# Patient Record
Sex: MALE | Race: WHITE | Employment: FULL TIME | URBAN - METROPOLITAN AREA
[De-identification: names, ages, dates, MRNs, and addresses within clinical notes are randomized per-mention and may not be internally consistent; named-entity substitution may affect disease eponyms.]

---

## 2023-03-03 ENCOUNTER — HOSPITAL ENCOUNTER (INPATIENT)
Age: 63
LOS: 11 days | Discharge: HOME | DRG: 215 | End: 2023-03-14
Attending: EMERGENCY MEDICINE | Admitting: INTERNAL MEDICINE
Payer: COMMERCIAL

## 2023-03-03 ENCOUNTER — APPOINTMENT (OUTPATIENT)
Dept: GENERAL RADIOLOGY | Age: 63
DRG: 215 | End: 2023-03-03
Payer: COMMERCIAL

## 2023-03-03 DIAGNOSIS — I48.91 ATRIAL FIBRILLATION WITH RVR (HCC): Primary | ICD-10-CM

## 2023-03-03 LAB
A/G RATIO: 1.4 (ref 1.1–2.2)
ALBUMIN SERPL-MCNC: 4.2 G/DL (ref 3.4–5)
ALP BLD-CCNC: 98 U/L (ref 40–129)
ALT SERPL-CCNC: 27 U/L (ref 10–40)
ANION GAP SERPL CALCULATED.3IONS-SCNC: 13 MMOL/L (ref 3–16)
AST SERPL-CCNC: 31 U/L (ref 15–37)
BASOPHILS ABSOLUTE: 0 K/UL (ref 0–0.2)
BASOPHILS RELATIVE PERCENT: 0.4 %
BILIRUB SERPL-MCNC: 0.5 MG/DL (ref 0–1)
BUN BLDV-MCNC: 18 MG/DL (ref 7–20)
CALCIUM SERPL-MCNC: 9.9 MG/DL (ref 8.3–10.6)
CHLORIDE BLD-SCNC: 103 MMOL/L (ref 99–110)
CHOLESTEROL, TOTAL: 180 MG/DL (ref 0–199)
CO2: 22 MMOL/L (ref 21–32)
CREAT SERPL-MCNC: 1 MG/DL (ref 0.8–1.3)
EKG ATRIAL RATE: 182 BPM
EKG DIAGNOSIS: NORMAL
EKG Q-T INTERVAL: 250 MS
EKG QRS DURATION: 86 MS
EKG QTC CALCULATION (BAZETT): 429 MS
EKG R AXIS: 31 DEGREES
EKG T AXIS: 116 DEGREES
EKG VENTRICULAR RATE: 177 BPM
EOSINOPHILS ABSOLUTE: 0.1 K/UL (ref 0–0.6)
EOSINOPHILS RELATIVE PERCENT: 0.6 %
GFR SERPL CREATININE-BSD FRML MDRD: >60 ML/MIN/{1.73_M2}
GLUCOSE BLD-MCNC: 117 MG/DL (ref 70–99)
HCT VFR BLD CALC: 44.9 % (ref 40.5–52.5)
HDLC SERPL-MCNC: 40 MG/DL (ref 40–60)
HEMOGLOBIN: 14.9 G/DL (ref 13.5–17.5)
LDL CHOLESTEROL CALCULATED: 119 MG/DL
LV EF: 18 %
LVEF MODALITY: NORMAL
LYMPHOCYTES ABSOLUTE: 1.5 K/UL (ref 1–5.1)
LYMPHOCYTES RELATIVE PERCENT: 17.2 %
MAGNESIUM: 2.1 MG/DL (ref 1.8–2.4)
MCH RBC QN AUTO: 31.4 PG (ref 26–34)
MCHC RBC AUTO-ENTMCNC: 33.3 G/DL (ref 31–36)
MCV RBC AUTO: 94.2 FL (ref 80–100)
MONOCYTES ABSOLUTE: 0.6 K/UL (ref 0–1.3)
MONOCYTES RELATIVE PERCENT: 7 %
NEUTROPHILS ABSOLUTE: 6.4 K/UL (ref 1.7–7.7)
NEUTROPHILS RELATIVE PERCENT: 74.8 %
PDW BLD-RTO: 14.8 % (ref 12.4–15.4)
PLATELET # BLD: 281 K/UL (ref 135–450)
PMV BLD AUTO: 8.4 FL (ref 5–10.5)
POTASSIUM SERPL-SCNC: 4.2 MMOL/L (ref 3.5–5.1)
PRO-BNP: 3971 PG/ML (ref 0–124)
RBC # BLD: 4.76 M/UL (ref 4.2–5.9)
SODIUM BLD-SCNC: 138 MMOL/L (ref 136–145)
SPECIMEN STATUS: NORMAL
TOTAL PROTEIN: 7.2 G/DL (ref 6.4–8.2)
TRIGL SERPL-MCNC: 103 MG/DL (ref 0–150)
TROPONIN: <0.01 NG/ML
TSH SERPL DL<=0.05 MIU/L-ACNC: 1.41 UIU/ML (ref 0.27–4.2)
VLDLC SERPL CALC-MCNC: 21 MG/DL
WBC # BLD: 8.6 K/UL (ref 4–11)

## 2023-03-03 PROCEDURE — 6360000002 HC RX W HCPCS: Performed by: INTERNAL MEDICINE

## 2023-03-03 PROCEDURE — 93005 ELECTROCARDIOGRAM TRACING: CPT | Performed by: EMERGENCY MEDICINE

## 2023-03-03 PROCEDURE — 93010 ELECTROCARDIOGRAM REPORT: CPT | Performed by: INTERNAL MEDICINE

## 2023-03-03 PROCEDURE — 96365 THER/PROPH/DIAG IV INF INIT: CPT

## 2023-03-03 PROCEDURE — 2580000003 HC RX 258: Performed by: INTERNAL MEDICINE

## 2023-03-03 PROCEDURE — 2580000003 HC RX 258: Performed by: EMERGENCY MEDICINE

## 2023-03-03 PROCEDURE — 6370000000 HC RX 637 (ALT 250 FOR IP): Performed by: INTERNAL MEDICINE

## 2023-03-03 PROCEDURE — 83735 ASSAY OF MAGNESIUM: CPT

## 2023-03-03 PROCEDURE — 96366 THER/PROPH/DIAG IV INF ADDON: CPT

## 2023-03-03 PROCEDURE — 2500000003 HC RX 250 WO HCPCS: Performed by: EMERGENCY MEDICINE

## 2023-03-03 PROCEDURE — 84484 ASSAY OF TROPONIN QUANT: CPT

## 2023-03-03 PROCEDURE — 85025 COMPLETE CBC W/AUTO DIFF WBC: CPT

## 2023-03-03 PROCEDURE — 2060000000 HC ICU INTERMEDIATE R&B

## 2023-03-03 PROCEDURE — 71045 X-RAY EXAM CHEST 1 VIEW: CPT

## 2023-03-03 PROCEDURE — 83036 HEMOGLOBIN GLYCOSYLATED A1C: CPT

## 2023-03-03 PROCEDURE — 80053 COMPREHEN METABOLIC PANEL: CPT

## 2023-03-03 PROCEDURE — 99285 EMERGENCY DEPT VISIT HI MDM: CPT

## 2023-03-03 PROCEDURE — 2500000003 HC RX 250 WO HCPCS: Performed by: INTERNAL MEDICINE

## 2023-03-03 PROCEDURE — 36415 COLL VENOUS BLD VENIPUNCTURE: CPT

## 2023-03-03 PROCEDURE — 83880 ASSAY OF NATRIURETIC PEPTIDE: CPT

## 2023-03-03 PROCEDURE — 84443 ASSAY THYROID STIM HORMONE: CPT

## 2023-03-03 PROCEDURE — 99291 CRITICAL CARE FIRST HOUR: CPT | Performed by: INTERNAL MEDICINE

## 2023-03-03 PROCEDURE — 80061 LIPID PANEL: CPT

## 2023-03-03 PROCEDURE — C8929 TTE W OR WO FOL WCON,DOPPLER: HCPCS

## 2023-03-03 RX ORDER — ACETAMINOPHEN 650 MG/1
650 SUPPOSITORY RECTAL EVERY 6 HOURS PRN
Status: DISCONTINUED | OUTPATIENT
Start: 2023-03-03 | End: 2023-03-13

## 2023-03-03 RX ORDER — FUROSEMIDE 10 MG/ML
20 INJECTION INTRAMUSCULAR; INTRAVENOUS DAILY
Status: DISCONTINUED | OUTPATIENT
Start: 2023-03-03 | End: 2023-03-07

## 2023-03-03 RX ORDER — NICOTINE 21 MG/24HR
1 PATCH, TRANSDERMAL 24 HOURS TRANSDERMAL DAILY
Status: DISCONTINUED | OUTPATIENT
Start: 2023-03-03 | End: 2023-03-14 | Stop reason: HOSPADM

## 2023-03-03 RX ORDER — DILTIAZEM HYDROCHLORIDE 5 MG/ML
10 INJECTION INTRAVENOUS ONCE
Status: COMPLETED | OUTPATIENT
Start: 2023-03-03 | End: 2023-03-03

## 2023-03-03 RX ORDER — ENOXAPARIN SODIUM 150 MG/ML
1 INJECTION SUBCUTANEOUS 2 TIMES DAILY
Status: DISCONTINUED | OUTPATIENT
Start: 2023-03-03 | End: 2023-03-09

## 2023-03-03 RX ORDER — FUROSEMIDE 10 MG/ML
20 INJECTION INTRAMUSCULAR; INTRAVENOUS ONCE
Status: COMPLETED | OUTPATIENT
Start: 2023-03-03 | End: 2023-03-03

## 2023-03-03 RX ORDER — SODIUM CHLORIDE 0.9 % (FLUSH) 0.9 %
5-40 SYRINGE (ML) INJECTION EVERY 12 HOURS SCHEDULED
Status: DISCONTINUED | OUTPATIENT
Start: 2023-03-03 | End: 2023-03-09 | Stop reason: SDUPTHER

## 2023-03-03 RX ORDER — ONDANSETRON 4 MG/1
4 TABLET, ORALLY DISINTEGRATING ORAL EVERY 8 HOURS PRN
Status: DISCONTINUED | OUTPATIENT
Start: 2023-03-03 | End: 2023-03-07

## 2023-03-03 RX ORDER — LOSARTAN POTASSIUM 25 MG/1
25 TABLET ORAL DAILY
Status: DISCONTINUED | OUTPATIENT
Start: 2023-03-03 | End: 2023-03-07

## 2023-03-03 RX ORDER — SODIUM CHLORIDE 9 MG/ML
INJECTION, SOLUTION INTRAVENOUS PRN
Status: DISCONTINUED | OUTPATIENT
Start: 2023-03-03 | End: 2023-03-10

## 2023-03-03 RX ORDER — AMIODARONE HYDROCHLORIDE 200 MG/1
200 TABLET ORAL 2 TIMES DAILY
Status: DISCONTINUED | OUTPATIENT
Start: 2023-03-03 | End: 2023-03-04

## 2023-03-03 RX ORDER — SODIUM CHLORIDE 0.9 % (FLUSH) 0.9 %
5-40 SYRINGE (ML) INJECTION PRN
Status: DISCONTINUED | OUTPATIENT
Start: 2023-03-03 | End: 2023-03-09 | Stop reason: SDUPTHER

## 2023-03-03 RX ORDER — POLYETHYLENE GLYCOL 3350 17 G/17G
17 POWDER, FOR SOLUTION ORAL DAILY PRN
Status: DISCONTINUED | OUTPATIENT
Start: 2023-03-03 | End: 2023-03-14 | Stop reason: HOSPADM

## 2023-03-03 RX ORDER — ACETAMINOPHEN 325 MG/1
650 TABLET ORAL EVERY 6 HOURS PRN
Status: DISCONTINUED | OUTPATIENT
Start: 2023-03-03 | End: 2023-03-13

## 2023-03-03 RX ORDER — DILTIAZEM HYDROCHLORIDE 5 MG/ML
10 INJECTION INTRAVENOUS ONCE
Status: DISCONTINUED | OUTPATIENT
Start: 2023-03-03 | End: 2023-03-03

## 2023-03-03 RX ORDER — ONDANSETRON 2 MG/ML
4 INJECTION INTRAMUSCULAR; INTRAVENOUS EVERY 6 HOURS PRN
Status: DISCONTINUED | OUTPATIENT
Start: 2023-03-03 | End: 2023-03-07

## 2023-03-03 RX ADMIN — LOSARTAN POTASSIUM 25 MG: 25 TABLET, FILM COATED ORAL at 17:18

## 2023-03-03 RX ADMIN — ENOXAPARIN SODIUM 105 MG: 150 INJECTION SUBCUTANEOUS at 21:39

## 2023-03-03 RX ADMIN — AMIODARONE HYDROCHLORIDE 200 MG: 200 TABLET ORAL at 17:18

## 2023-03-03 RX ADMIN — SODIUM CHLORIDE 15 MG/HR: 900 INJECTION, SOLUTION INTRAVENOUS at 13:54

## 2023-03-03 RX ADMIN — ENOXAPARIN SODIUM 105 MG: 150 INJECTION SUBCUTANEOUS at 14:08

## 2023-03-03 RX ADMIN — FUROSEMIDE 20 MG: 10 INJECTION, SOLUTION INTRAMUSCULAR; INTRAVENOUS at 14:12

## 2023-03-03 RX ADMIN — DILTIAZEM HYDROCHLORIDE 10 MG: 5 INJECTION INTRAVENOUS at 06:37

## 2023-03-03 RX ADMIN — FUROSEMIDE 20 MG: 10 INJECTION, SOLUTION INTRAMUSCULAR; INTRAVENOUS at 17:20

## 2023-03-03 RX ADMIN — Medication 10 ML: at 21:39

## 2023-03-03 RX ADMIN — SODIUM CHLORIDE 5 MG/HR: 900 INJECTION, SOLUTION INTRAVENOUS at 06:37

## 2023-03-03 RX ADMIN — AMIODARONE HYDROCHLORIDE 1 MG/MIN: 50 INJECTION, SOLUTION INTRAVENOUS at 17:28

## 2023-03-03 RX ADMIN — DILTIAZEM HYDROCHLORIDE 10 MG: 5 INJECTION INTRAVENOUS at 07:39

## 2023-03-03 ASSESSMENT — ENCOUNTER SYMPTOMS
STRIDOR: 0
HEMATOCHEZIA: 0
LEFT EYE: 0
HEMATEMESIS: 0
WHEEZING: 0
RIGHT EYE: 0
SHORTNESS OF BREATH: 1
ORTHOPNEA: 1

## 2023-03-03 NOTE — CONSULTS
Cardiac Electrophysiology Consult Note      Physician requesting consult: Joselin Monsivais MD   Reason for Consult: atrial fibrillation   Admission Date: 3/3/2023  Room/Bed:  [facility]8/0208-01    Assessment:     1. Atrial fibrillation: patient has first documented episode of atrial fibrillation. Associated symptoms: Dyspnea on exertion, Fatigue, and Shortness of breath     History of cardioversion: No prior history of cardioversion  History of AF ablation: No history of atrial fibrillation ablation in the past  History of heart surgery/procedure: no    Current use of anti-arrhythmic drugs: Not currently on anti-arrhythmic drugs  Previous use of anti-arrhythmic drugs: Not previously on any anti-arrhythmic drugs    Overall LV function: Severe left ventricular systolic dysfunction   Size of left atrium: Moderately dilated LA size  Significant cardiac valvular disease: No significant valve disease  Family history of atrial fibrillation: Yes, mother and others    Alcohol consumption: Moderate alcohol consumption  Caffeine consumption: Moderate caffeine intake (coffee)  Smoking status: smoker  (1/2 ppd x many yrs)  Obstructive sleep apnea: Suspected, not yet tested  Exercise status: Does not exercise regularly (sedentary lifestyle)    I had a detailed discussion with the patient about issues related to atrial fibrillation (including etiology, disease progression patterns, stroke risk and rate control issues). Patient had his questions answered to his satisfaction. Patient has a [id number] score of at least 2 [Heart failure (1 point) and Hypertension (1 point)]  Longterm anticoagulation is: recommended  Current anticoagulation: Low molecular weight heparin (LMWH)  Bleeding issues reported: no  Renal function: Normal  Thyroid function:  pending    Patient presented with signs and symptoms of HF and was found to have severe LV systolic dysfunction.  His atria are dilated indicating the AF likely has been ongoing for much longer than the past 2 weeks. Not clear if the cardiomyopathy caused the atrial fibrillation (or vice versa). Regardless, the goal now is to slow him down and plan for SUSI/CV to assess him in sinus rhythm. His cardiomyopathy will need investigation of etiology once more stable. Would avoid calcium channel blocker like diltiazem in severe cardiomyopathy and HF. Will switch patient to IV and oral amiodarone to load him up in anticipation of cardioversion Monday (SUSI guided). Since his is naive to diuretics, IV lasix 20 mg daily would be a good starting point and increase dose as needed. He is already feeling significantly better. Once no more procedure planned, can transition to oral anticoagulation. 2. Cardiomyopathy and acute systolic heart failure: as above. Has evidence of left sided heart failure (both on exam and imaging studies). Will not start beta-blocker yet with acute decompensated HF. But will need introduction of HF medications gradually. Start losartan 25 mg daily. 3. Elevated blood pressure without diagnosis of hypertension    Plan:     1. Discontinue IV diltiazem drip  2. Start amiodarone drip (1mg/min)  3. Start oral amiodarone 200 BID  4. Start furosemide 20 IV daily  5. Start losartan 25 mg daily  6. NPO Sunday night for SUSI/CV Monday (scheduled)    Subjective:     History of Present Illness:    Patient is a 58 y.o. male with past medical history significant for active smoking but otherwise no other medical known issues. He presented to the hospital ED this morning with complaints of fatigue and shortness of breath for the past couple of weeks. Patient states that the symptoms got progressively worse over the past few days. Last night, he could not lay down and felt as if he was suffocating. He went to the bathroom and felt as if there was no air. Denies any recent episodes of syncope. No reported dizziness. Has not been experiencing any chest pain.     Denies any leg swelling  No bleeding issues reported    Drinks two beers per day  Smokes 1/2 PPD for many years  Moderate caffeine intake    Mother with atrial fibrillation   Father  in 52's of apparent cardiomyopathy    Problem List:  Patient Active Problem List   Diagnosis    Atrial fibrillation with rapid ventricular response ([facility] [address]       History:  History reviewed. No pertinent past medical history. History reviewed. No pertinent surgical history. History reviewed. No pertinent family history. Social History     Socioeconomic History    Marital status: Single     Spouse name: None    Number of children: None    Years of education: None    Highest education level: None   Tobacco Use    Smoking status: Every Day     Types: Cigarettes    Smokeless tobacco: Never   Substance and Sexual Activity    Alcohol use: Yes     Comment: daily    Drug use: Not Currently         Review of Systems:  Review of Systems   Constitutional: Positive for malaise/fatigue. Negative for weight gain and weight loss. HENT:  Negative for nosebleeds and stridor. Eyes:  Negative for vision loss in left eye and vision loss in right eye. Cardiovascular:  Positive for dyspnea on exertion and orthopnea. Negative for chest pain, leg swelling and syncope. Respiratory:  Positive for shortness of breath. Negative for wheezing. Hematologic/Lymphatic: Negative for bleeding problem. Does not bruise/bleed easily. Skin:  Negative for itching and rash. Musculoskeletal:  Negative for joint pain and joint swelling. Gastrointestinal:  Negative for hematemesis and hematochezia. Genitourinary:  Negative for dysuria and hematuria. Neurological:  Negative for dizziness and light-headedness. Psychiatric/Behavioral:  Negative for altered mental status. The patient is not nervous/anxious.       Objective:     Vital Signs (last 24 hours):  Patient Vitals for the past 24 hrs:   BP Temp Temp src Pulse Resp SpO2 Height Weight   23 1530 -- -- -- -- -- -- 6' 2\" (1.88 m) 235 lb (106.6 kg)   03/03/23 1401 (!) 146/117 97.5 °F (36.4 °C) Axillary (!) 124 18 97 % -- --   03/03/23 1301 (!) 124/92 98.3 °F (36.8 °C) Oral (!) 133 16 96 % -- --   03/03/23 1115 (!) 134/101 -- -- (!) 125 16 95 % -- --   03/03/23 0929 (!) 131/110 -- -- (!) 133 16 96 % -- --   03/03/23 0908 -- -- -- -- -- -- -- 235 lb (106.6 kg)   03/03/23 0811 (!) 139/108 -- -- -- -- -- -- --   03/03/23 0737 (!) 136/105 -- -- (!) 135 16 94 % -- --   03/03/23 0712 (!) 112/90 -- -- (!) 136 16 98 % -- --   03/03/23 0710 (!) 112/90 -- -- (!) 136 -- -- -- --   03/03/23 0549 (!) 145/110 98.1 °F (36.7 °C) Oral (!) 176 16 98 % -- --         Physical Examination:     Physical Exam  Constitutional:       Appearance: Normal appearance. HENT:      Head: Normocephalic and atraumatic. Nose: Nose normal. No rhinorrhea. Eyes:      General: No scleral icterus. Conjunctiva/sclera: Conjunctivae normal.   Cardiovascular:      Rate and Rhythm: Tachycardia present. Rhythm irregularly irregular. Pulmonary:      Effort: Pulmonary effort is normal.      Breath sounds: Decreased breath sounds and rales present. Abdominal:      General: There is no distension. Musculoskeletal:         General: Normal range of motion. Cervical back: Normal range of motion and neck supple. Skin:     General: Skin is warm and dry. Neurological:      General: No focal deficit present. Mental Status: He is alert and oriented to person, place, and time. Psychiatric:         Mood and Affect: Mood normal.         Behavior: Behavior normal.       No intake/output data recorded.     Medications:    Current Facility-Administered Medications   Medication Dose Route Frequency Provider Last Rate Last Admin    dilTIAZem 100 mg in sodium chloride 0.9 % 100 mL infusion ([facility])  2.5-15 mg/hr IntraVENous Continuous Cheryl Wolfe MD 15 mL/hr at 03/03/23 1354 15 mg/hr at 03/03/23 1354    sodium chloride flush 0.9 % injection 5-40 mL  5-40 mL IntraVENous 2 times per day Selene Jarrett MD        sodium chloride flush 0.9 % injection 5-40 mL  5-40 mL IntraVENous PRN Kacy Lopez MD        0.9 % sodium chloride infusion   IntraVENous PRN Selene Jarrett MD        ondansetron (ZOFRAN-ODT) disintegrating tablet 4 mg  4 mg Oral Q8H PRN Selene Jarrett MD        Or    ondansetron (ZOFRAN) injection 4 mg  4 mg IntraVENous Q6H PRN Selene Jarrett MD        polyethylene glycol (GLYCOLAX) packet 17 g  17 g Oral Daily PRN Selene Jarrett MD        acetaminophen (TYLENOL) tablet 650 mg  650 mg Oral Q6H PRN Selene Jarrett MD        Or    acetaminophen (TYLENOL) suppository 650 mg  650 mg Rectal Q6H PRN Selene Jarrett MD        perflutren lipid microspheres (DEFINITY) injection 1.5 mL  1.5 mL IntraVENous ONCE PRN Selene Jarrett MD        enoxaparin (LOVENOX) injection 105 mg  1 mg/kg (Order-Specific) SubCUTAneous BID Selene Jarrett MD   105 mg at 03/03/23 1408    nicotine (NICODERM CQ) 14 MG/24HR 1 patch  1 patch TransDERmal Daily Selene Jarrett MD   1 patch at 03/03/23 1412         ECG Interpretation:  (Date: 3/3/2023)  Rhythm: Atrial fibrillation  Rate: 177 BPM  PAC's / PVC's present: None  Conduction abnormalities:  none  Axis: normal        Echocardiogram:    Results for orders placed or performed during the hospital encounter of 03/03/23   Echo Complete    Narrative        Summary   Technically difficult study due to patient is a smoker and poor acoustic   window. Difficult to assess left ventricular systolic function due to arrhythmia but   appears severely reduced ef 15-20%. Severe global HK with regional variation. Mild concentric left ventricular hypertrophy. Left ventricular diastolic filling pressure is elevated. Mild to moderate mitral regurgitation. Moderate Bi-atrial enlargement. Right ventricular systolic function is mildly reduced . Mild tricuspid regurgitation.    Systolic pulmonic artery pressure (SPAP) is estimated at 41 mmHg consistent   with mild pulmonary hypertension (Right atrial pressure of 8 mmHg). Findings      Left Ventricle   Difficult to assess left ventricular systolic function due to arrhythmia but   appears severely reduced ef 15-20%. Severe global HK with regional variation. Normal left ventricular size with mild concentric left ventricular   hypertrophy. Left ventricular diastolic filling pressure is elevated septal E/e\" is 12. Mitral Valve   Mild thickening of leaflets of mitral valve. No mitral stenosis. Mild to moderate mitral regurgitation. Left Atrium   The left atrium is moderately dilated. Moderate Bi-atrial enlargement. Aortic Valve   Aortic valve appears sclerotic but opens adequately. No evidence of aortic valve regurgitation. Aorta   The aortic root is normal in size. Right Ventricle   Normal right ventricular size. Right ventricular systolic function is mildly reduced . TAPSE is measured at 13 mm. S' prime velocity is measured at 8 cm/s. Tricuspid Valve   Tricuspid valve is structurally normal.   Mild tricuspid regurgitation. Systolic pulmonic artery pressure (SPAP) is estimated at 41 mmHg consistent   with mild pulmonary hypertension (Right atrial pressure of 8 mmHg). Right Atrium   The right atrium is moderately dilated in size at 24 cm2. Pulmonic Valve   The pulmonic valve is not well visualized. No evidence of pulmonic valve regurgitation. Pericardial Effusion   No significant pericardial effusion noted. Pleural Effusion   Left pleural effusion      Miscellaneous   IVC size is normal (<2.1 cm) but collapses < 50% with respiration consistent   with elevated RA pressure (8 mmHg). Technically difficult study due to   patient is a smoker and poor acoustic window.      M-Mode/2D Measurements (cm)      LV Diastolic Dimension: 7.89 cm LV Systolic Dimension: 2.58 cm   LV Septum Diastolic: 5.82 cm   LV PW Diastolic: 3.21 cm        AO Root Dimension: 3.2 cm                                   LA Dimension: 4.5 cm                                   LA Area: 26.2 cm2                                   LA volume/Index: 92.07 ml /40 ml/m2     Doppler Measurements      AV Peak Velocity: 74.7 cm/s    MV Peak E-Wave: 98.1 cm/s   AV Peak Gradient: 2.23 mmHg      TR Velocity:288 cm/s   TR Gradient:33.18 mmHg   Estimated RAP:8 mmHg   Estimated RVSP: 41 mmHg   E' Septal Velocity: 7.29 cm/s   E' Lateral Velocity: 9.36 cm/s   E/E' ratio: 11   PV Peak Velocity: 82.8 cm/s   PV Peak Gradient: 2.74 mmHg      Aortic Valve      Peak Velocity: 74.7 cm/s   Peak Gradient: 2.23 mmHg     Aorta      Aortic Root: 3.2 cm          Telemetry:    Atrial fibrillation with rapid ventricular response     Lab Review:    Recent Labs     03/03/23  0557   WBC 8.6   HGB 14.9   HCT 44.9   MCV 94.2          Recent Labs     03/03/23  0557      K 4.2      CO2 22   BUN 18   CREATININE 1.0       No results for input(s): INR, PROTIME in the last 72 hours. Recent Labs     03/03/23  0557 03/03/23  1413   TROPONINI <0.01 <0.01       Recent Labs     03/03/23  0557   PROBNP 3,971*       Imaging:    XR CHEST PORTABLE    Result Date: 3/3/2023  1. Cardiomegaly with bilateral lung infiltrates and bilateral pleural effusions, likely related to pulmonary edema. Superimposed pneumonia cannot be excluded. Patient was seen in the telemetry unit. Due to the high probability of clinically significant life threating deterioration of the patient's condition, a total critical care time 40 minutes was used. This time excludes any time spent performing procedures. Time includes, but not limited to, review of vital signs, telemetry monitoring, continuous pulse oximety, IV medications, clinical response to medications and cardiovascular test results.  It also includes documentation time, consultation time, interpretation of lab data, and review of nursing notes and available old records.         Signed by: Arabella Thornton MD

## 2023-03-03 NOTE — ED PROVIDER NOTES
[facility]  ED     EMERGENCY DEPARTMENT ENCOUNTER            Pt Name: Peter Zamora   MRN: [id number]   [place] 1960   Date of evaluation: 3/3/2023   Provider: Saúl Celestin DO   PCP: No primary care provider on file. Note Started: 8:20 AM EST 3/3/23          CHIEF COMPLAINT     Chief Complaint   Patient presents with    Shortness of Breath     Started a couple weeks ago, worst the last couple days. HISTORY OF PRESENT ILLNESS:   History from : Patient   Limitations to history : None     Peter Zamora is a 58 y.o. male who presents to the emergency department complaining of fatigue and shortness of breath over the last couple of weeks. Patient denies fevers, chills, or sweats. No cough, congestion. No orthopnea. Patient denies chest pain. No lower extremity swelling or edema. No sick contacts. Patient arrives into the emergency department with heart rate of approximately 170. Nursing Notes were all reviewed and agreed with, or any disagreements were addressed in the HPI. REVIEW OF SYSTEMS :    Positives and Pertinent negatives as per HPI. MEDICAL HISTORY   has no past medical history on file. No past surgical history on file. CURRENTMEDICATIONS       Previous Medications    No medications on file      SCREENINGS                           CIWA Assessment  BP: (!) 139/108  Heart Rate: (!) 135                  PHYSICAL EXAM :  ED Triage Vitals [03/03/23 0549]   BP Temp Temp Source Heart Rate Resp SpO2 Height Weight   (!) 145/110 98.1 °F (36.7 °C) Oral (!) 176 16 98 % -- --      GENERAL APPEARANCE: Awake and alert. Cooperative. No acute distress. HEAD: Normocephalic. Atraumatic. EYES: PERRL. EOM's grossly intact. ENT: Mucous membranes are moist.   NECK: Supple, trachea midline. HEART: Tachycardic. Irregularly irregular. . Normal S1, S2. No murmurs, rubs or gallops. LUNGS: Respirations unlabored. CTAB. Good air exchange.  No wheezes, rales, or rhonchi. Speaking comfortably in full sentences. ABDOMEN: Soft. Non-distended. Non-tender. No guarding or rebound. Normal Bowel sounds. EXTREMITIES: No peripheral edema. MAEE. No acute deformities. SKIN: Warm and dry. No acute rashes. NEUROLOGICAL: Alert and oriented X 3. CN II-XII intact. No gross facial drooping. Strength 5/5 in all extremities. Sensation intact. No pronator drift. Normal coordination. Gait normal.   PSYCHIATRIC: Normal mood and affect. DIAGNOSTIC RESULTS     LABS:   Labs Reviewed   COMPREHENSIVE METABOLIC PANEL - Abnormal; Notable for the following components:       Result Value    Glucose 117 (*)     All other components within normal limits   BRAIN NATRIURETIC PEPTIDE - Abnormal; Notable for the following components:    Pro-BNP 3,971 (*)     All other components within normal limits   CBC WITH AUTO DIFFERENTIAL   TROPONIN   MAGNESIUM   SAMPLE POSSIBLE BLOOD BANK TESTING      When ordered only abnormal lab results are displayed. All other labs were within normal range or not returned as of this dictation. RADIOLOGY:      Non-plain film images such as CT, Ultrasound and MRI are read by the radiologist. Plain radiographic images are visualized and preliminarily interpreted by the ED Provider with the below findings:   Interpretation per the Radiologist below, if available at the time of this note:     XR CHEST PORTABLE   Preliminary Result   1. Cardiomegaly with bilateral lung infiltrates and bilateral pleural   effusions, likely related to pulmonary edema. Superimposed pneumonia cannot   be excluded. XR CHEST PORTABLE    Result Date: 3/3/2023  EXAMINATION: ONE XRAY VIEW OF THE CHEST 3/3/2023 6:13 am COMPARISON: None. HISTORY: ORDERING SYSTEM PROVIDED HISTORY: sob TECHNOLOGIST PROVIDED HISTORY: Reason for exam:->sob Reason for Exam: sob FINDINGS: The a cardiac silhouette is enlarged.   Mediastinal contours are normal. There are bilateral lung infiltrates with bilateral pleural effusions. No pneumothorax. The visualized osseous structures are unremarkable. 1. Cardiomegaly with bilateral lung infiltrates and bilateral pleural effusions, likely related to pulmonary edema. Superimposed pneumonia cannot be excluded. EKG: Atrial fibrillation with RVR. Rate of 177. Normal axis. Normal intervals and durations. Anterior/septal Q waves noted. No previous EKG available for review. CRITICAL CARE TIME   Critical care 35 minutes was completed on patient in addition to and excluding any procedures noted. Vitals:    Vitals:    03/03/23 0710 03/03/23 0712 03/03/23 0737 03/03/23 0811   BP: (!) 112/90 (!) 112/90 (!) 136/105 (!) 139/108   Pulse: (!) 136 (!) 136 (!) 135    Resp:  16 16    Temp:       TempSrc:       SpO2:  98% 94%              Is this patient to be included in the SEP-1 Core Measure due to severe sepsis or septic shock? No   Exclusion criteria - the patient is NOT to be included for SEP-1 Core Measure due to: Alternative explanation for abnormal labs/vitals that do not relate to sepsis, see MDM for further explanation       CC/HPI Summary, DDx, ED Course, and Reassessment:     Patient presents to the emergency department with palpitations, fatigue, and shortness of breath over the last couple of weeks. Patient presents with heart rate of approximately 1 60-1 70. EKG is concerning for atrial fibrillation with RVR. Abdomen: Soft. Nontender. Nondistended. Extremities: No swelling or edema. Patient was given the following medications:   Medications   dilTIAZem 100 mg in sodium chloride 0.9 % 100 mL infusion ([facility]) (15 mg/hr IntraVENous Rate/Dose Change 3/3/23 0811)   dilTIAZem injection 10 mg (10 mg IntraVENous Given 3/3/23 0637)   dilTIAZem injection 10 mg (10 mg IntraVENous Given 3/3/23 0739)        CONSULTS:   None   Discussion with Other Professionals: Patient discussed with hospitalist.  Patient will be admitted.   {Social Determinants: None   Chronic Conditions:  None    Records Reviewed: NA      Disposition Considerations:    I am the Primary Clinician of Record. FINAL IMPRESSION    1. Atrial fibrillation with RVR (HCC)           DISPOSITION/PLAN     PATIENT REFERRED TO:   No follow-up provider specified.      DISCHARGE MEDICATIONS:   New Prescriptions    No medications on file        DISCONTINUED MEDICATIONS:   Discontinued Medications    No medications on file              (Please note that portions of this note were completed with a voice recognition program.  Efforts were made to edit the dictations but occasionally words are mis-transcribed.)       Mckinley Ny DO (electronically signed)             Mckinley Ny DO  03/03/23 2075

## 2023-03-03 NOTE — CARE COORDINATION
Spoke with patient at bedside. He lives alone in an apartment. He is independent at home, works full time and drives. He denies any services or DME at home. Discussed lack of PCP listed in chart and he confirmed this. Provided him with PCP list and [facility]. Patient does not anticipate any further needs from CM.

## 2023-03-03 NOTE — H&P
Hospital Medicine History & Physical      PCP: No primary care provider on file. Date of Admission: 3/3/2023    Date of Service: Pt seen/examined on 03/03/23 and Admitted to Inpatient with expected LOS greater than two midnights due to medical therapy of atrial fibrillation with rapid ventricular response, new onset    Chief Complaint: Palpitations and shortness of breath      History Of Present Illness:      58 y.o. male who presented to [facility] with 2 to 3-week duration of persistent shortness of breath with exertion in addition to chronic fatigue. No fever or chills. No sick contacts. No chest pain. No orthopnea. Has not noticed any swelling. No past cardiac history. Not seen by a physician for a long time because he did not need it. Noticed about 2 to 3 weeks ago that patient was getting short of breath especially with exertion. Recently only a few steps make the patient more short of breath. Noticed palpitations and checked his pulse was very fast so decided to come to the emergency department. No other accompanying symptoms    Nothing else that makes the patient feel better or worse. In the emergency department, patient was diagnosed with atrial fibrillation with rapid ventricular response. At the time of arrival, heart rate was around 170. Confirmed that never had anything like this before. Hospitalist service was called for admission. Seen in the emergency department, pleasant in no acute distress. Past Medical History:      History reviewed. No pertinent past medical history. Past Surgical History:      History reviewed. No pertinent surgical history. Medications Prior to Admission:      Patient is not taking any home medications    Allergies:  Patient has no known allergies. Social History:      The patient currently lives at home    TOBACCO:   reports that he has been smoking cigarettes.  He has never used smokeless tobacco.  ETOH:   reports current alcohol use.  E-cigarette/Vaping       Questions Responses    E-cigarette/Vaping Use     Start Date     Passive Exposure     Quit Date     Counseling Given     Comments               Family History:       Reviewed and negative in regards to presenting illness/complaint. History reviewed. No pertinent family history. REVIEW OF SYSTEMS COMPLETED:   Pertinent positives as noted in the HPI. Shortness of breath with exertion and notations as explained above. All other systems reviewed and negative. PHYSICAL EXAM PERFORMED:    BP (!) 139/108   Pulse (!) 135   Temp 98.1 °F (36.7 °C) (Oral)   Resp 16   SpO2 94%     General appearance:  No apparent distress, appears stated age and cooperative. HEENT:  Normal cephalic, atraumatic without obvious deformity. Pupils equal, round, and reactive to light. Extra ocular muscles intact. Conjunctivae/corneas clear. Neck: Supple, with full range of motion. No jugular venous distention. Trachea midline. Respiratory:  Normal respiratory effort. Clear to auscultation, bilaterally without Rales/Wheezes/Rhonchi. Cardiovascular: Irregularly irregular rhythm with normal S1/S2 without murmurs, rubs or gallops. Abdomen: Soft, non-tender, non-distended with normal bowel sounds. Musculoskeletal:  No clubbing, cyanosis or edema bilaterally. Full range of motion without deformity. Skin: Skin color, texture, turgor normal.  No rashes or lesions. Neurologic:  Neurovascularly intact without any focal sensory/motor deficits.  Cranial nerves: II-XII intact, grossly non-focal.  Psychiatric:  Alert and oriented, thought content appropriate, normal insight  Capillary Refill: Brisk,3 seconds, normal  Peripheral Pulses: +2 palpable, equal bilaterally       Labs:     Recent Labs     03/03/23 0557   WBC 8.6   HGB 14.9   HCT 44.9        Recent Labs     03/03/23 0557      K 4.2      CO2 22   BUN 18   CREATININE 1.0   CALCIUM 9.9     Recent Labs     03/03/23 0557   AST 31 ALT 27   BILITOT 0.5   ALKPHOS 98     No results for input(s): INR in the last 72 hours. Recent Labs     03/03/23  0557   TROPONINI <0.01       Urinalysis:    No results found for: Reed Nilson, BACTERIA, RBCUA, BLOODU, [address], GLUCOSEU    Radiology:     CXR: I have reviewed the CXR with the following interpretation: Cardiomegaly with pleural effusions, consistent with pulmonary edema  EKG:  I have reviewed the EKG with the following interpretation: Atrial fibrillation with rapid ventricular response    XR CHEST PORTABLE   Preliminary Result   1. Cardiomegaly with bilateral lung infiltrates and bilateral pleural   effusions, likely related to pulmonary edema. Superimposed pneumonia cannot   be excluded. Consults:    IP CONSULT TO CARDIOLOGY    ASSESSMENT:    Active Hospital Problems    Diagnosis Date Noted    Atrial fibrillation with rapid ventricular response ([facility] [address] [I48.91] 03/03/2023     Priority: Medium           PLAN:      Atrial fibrillation with rapid ventricular response  No previously known atrial fibrillation. Complaints started about a couple weeks ago. Unclear when exactly the atrial fibrillation started. Patient will be admitted to hospital for treatment of atrial fibrillation and accompanying cardiac conditions. No evidence of CVA. Atrial Fibrillation CHADSVASC2 Score Stroke Risk:   58 y.o. <65 - 0    male Male - 0   CHF HX: Yes - 1   HTN HX: No - 0   Stroke/TIA/Thromboembolism No - 0   Vascular Disease HX: No - 0   Diabetes Mellitus No - 0   CHADSVASC 2 Score 1      Annual Stroke Risk 0.6% - low-moderate risk    Defer long-term anticoagulation decision to cardiology, but I will start therapeutic Lovenox for now, when the patient is admitted. .  Cardiology consulted for atrial fibrillation  I discussed current situation with emergency room physician personally.   We agreed that presentation is acute and constitutes high risk and patient needs to be followed in the hospital closely on continuous telemetry monitor in addition to antiarrhythmic continuous infusion. Pulmonary edema  Could be secondary to tachyarrhythmia and consequent transient myocardial dysfunction. However, patient also can have an underlying cardiac condition that may have precipitated the atrial fibrillation. We will give a dose of IV Lasix for now but we will not continue. Cardiology to decide  I will also order an echocardiogram.  Combination of pulmonary edema and atrial fibrillation is suspicious for mitral valve disease. This needs to be ruled out. Hypertension  Patient has no known history of hypertension. Blood pressure is above normal.  Should improve with diltiazem. Monitor and decide on treatment with long-term treatment with antihypertensives once heart rate is controlled. No chest pain  I will also check lipid profile. Tobacco abuse  Nicotine patch to be provided. No recent medical care, no known medical conditions and long-term smoker. May have underlying but unnoticed coronary artery disease. Troponin will be checked 2 more sets    Discussed with the patient. Questions answered    Discussed with emergency room physician as explained above      DVT Prophylaxis: Therapeutic Lovenox  Diet: Regular diet with salt restriction  Code Status: Full code    PT/OT Eval Status: Not indicated for now    Dispo - Inpatient stay, probably 2 or 3 days     Altaf Corrigan MD    Thank you for the opportunity to be involved in this patient's care. If you have any questions or concerns please feel free to contact me at [phone].

## 2023-03-04 LAB
ANION GAP SERPL CALCULATED.3IONS-SCNC: 11 MMOL/L (ref 3–16)
BASOPHILS ABSOLUTE: 0 K/UL (ref 0–0.2)
BASOPHILS RELATIVE PERCENT: 0.6 %
BUN BLDV-MCNC: 13 MG/DL (ref 7–20)
CALCIUM SERPL-MCNC: 8.9 MG/DL (ref 8.3–10.6)
CHLORIDE BLD-SCNC: 101 MMOL/L (ref 99–110)
CO2: 25 MMOL/L (ref 21–32)
CREAT SERPL-MCNC: 0.9 MG/DL (ref 0.8–1.3)
EOSINOPHILS ABSOLUTE: 0.1 K/UL (ref 0–0.6)
EOSINOPHILS RELATIVE PERCENT: 1.8 %
ESTIMATED AVERAGE GLUCOSE: 108.3 MG/DL
GFR SERPL CREATININE-BSD FRML MDRD: >60 ML/MIN/{1.73_M2}
GLUCOSE BLD-MCNC: 93 MG/DL (ref 70–99)
HBA1C MFR BLD: 5.4 %
HCT VFR BLD CALC: 41.4 % (ref 40.5–52.5)
HEMOGLOBIN: 13.6 G/DL (ref 13.5–17.5)
INR BLD: 1.1 (ref 0.87–1.14)
LYMPHOCYTES ABSOLUTE: 1.7 K/UL (ref 1–5.1)
LYMPHOCYTES RELATIVE PERCENT: 23.9 %
MCH RBC QN AUTO: 31 PG (ref 26–34)
MCHC RBC AUTO-ENTMCNC: 32.9 G/DL (ref 31–36)
MCV RBC AUTO: 94.3 FL (ref 80–100)
MONOCYTES ABSOLUTE: 0.6 K/UL (ref 0–1.3)
MONOCYTES RELATIVE PERCENT: 9.2 %
NEUTROPHILS ABSOLUTE: 4.5 K/UL (ref 1.7–7.7)
NEUTROPHILS RELATIVE PERCENT: 64.5 %
PDW BLD-RTO: 14.7 % (ref 12.4–15.4)
PLATELET # BLD: 253 K/UL (ref 135–450)
PMV BLD AUTO: 9 FL (ref 5–10.5)
POTASSIUM REFLEX MAGNESIUM: 3.9 MMOL/L (ref 3.5–5.1)
PROTHROMBIN TIME: 14.1 SEC (ref 11.7–14.5)
RBC # BLD: 4.39 M/UL (ref 4.2–5.9)
SODIUM BLD-SCNC: 137 MMOL/L (ref 136–145)
WBC # BLD: 7 K/UL (ref 4–11)

## 2023-03-04 PROCEDURE — 99233 SBSQ HOSP IP/OBS HIGH 50: CPT | Performed by: NURSE PRACTITIONER

## 2023-03-04 PROCEDURE — 2580000003 HC RX 258: Performed by: NURSE PRACTITIONER

## 2023-03-04 PROCEDURE — 6370000000 HC RX 637 (ALT 250 FOR IP): Performed by: INTERNAL MEDICINE

## 2023-03-04 PROCEDURE — 36415 COLL VENOUS BLD VENIPUNCTURE: CPT

## 2023-03-04 PROCEDURE — 85025 COMPLETE CBC W/AUTO DIFF WBC: CPT

## 2023-03-04 PROCEDURE — 2580000003 HC RX 258: Performed by: INTERNAL MEDICINE

## 2023-03-04 PROCEDURE — 6360000002 HC RX W HCPCS: Performed by: INTERNAL MEDICINE

## 2023-03-04 PROCEDURE — 2060000000 HC ICU INTERMEDIATE R&B

## 2023-03-04 PROCEDURE — 80048 BASIC METABOLIC PNL TOTAL CA: CPT

## 2023-03-04 PROCEDURE — 6360000002 HC RX W HCPCS: Performed by: NURSE PRACTITIONER

## 2023-03-04 PROCEDURE — 85610 PROTHROMBIN TIME: CPT

## 2023-03-04 RX ORDER — AMIODARONE HYDROCHLORIDE 200 MG/1
400 TABLET ORAL 3 TIMES DAILY
Status: DISCONTINUED | OUTPATIENT
Start: 2023-03-04 | End: 2023-03-06

## 2023-03-04 RX ORDER — METOPROLOL SUCCINATE 25 MG/1
25 TABLET, EXTENDED RELEASE ORAL 2 TIMES DAILY
Status: DISCONTINUED | OUTPATIENT
Start: 2023-03-04 | End: 2023-03-09

## 2023-03-04 RX ADMIN — AMIODARONE HYDROCHLORIDE 150 MG: 50 INJECTION, SOLUTION INTRAVENOUS at 04:15

## 2023-03-04 RX ADMIN — LOSARTAN POTASSIUM 25 MG: 25 TABLET, FILM COATED ORAL at 08:42

## 2023-03-04 RX ADMIN — AMIODARONE HYDROCHLORIDE 400 MG: 200 TABLET ORAL at 15:41

## 2023-03-04 RX ADMIN — METOPROLOL SUCCINATE 25 MG: 25 TABLET, EXTENDED RELEASE ORAL at 08:42

## 2023-03-04 RX ADMIN — ENOXAPARIN SODIUM 105 MG: 150 INJECTION SUBCUTANEOUS at 20:02

## 2023-03-04 RX ADMIN — AMIODARONE HYDROCHLORIDE 400 MG: 200 TABLET ORAL at 20:02

## 2023-03-04 RX ADMIN — AMIODARONE HYDROCHLORIDE 400 MG: 200 TABLET ORAL at 08:42

## 2023-03-04 RX ADMIN — Medication 10 ML: at 20:02

## 2023-03-04 RX ADMIN — Medication 10 ML: at 08:43

## 2023-03-04 RX ADMIN — METOPROLOL SUCCINATE 25 MG: 25 TABLET, EXTENDED RELEASE ORAL at 20:02

## 2023-03-04 RX ADMIN — ENOXAPARIN SODIUM 105 MG: 150 INJECTION SUBCUTANEOUS at 08:42

## 2023-03-04 RX ADMIN — FUROSEMIDE 20 MG: 10 INJECTION, SOLUTION INTRAMUSCULAR; INTRAVENOUS at 08:42

## 2023-03-04 NOTE — PROGRESS NOTES
HR elevated overnight. Amio gtt continued. One time bolus given. Otherwise VSS. Will continue to monitor and give report to oncoming shift.

## 2023-03-04 NOTE — PLAN OF CARE
Problem: Discharge Planning  Goal: Discharge to home or other facility with appropriate resources  3/4/2023 1048 by Beryl Sims RN  Outcome: Progressing  Note:   Patient's EF (Ejection Fraction) is less than 40%    Heart Failure Medications:  Diuretics[de-identified] Furosemide    (One of the following REQUIRED for EF </= 40%/SYSTOLIC FAILURE but MAY be used in EF% >40%/DIASTOLIC FAILURE)        ACE[de-identified] None        ARB[de-identified] Losartan         ARNI[de-identified] None    (Beta Blockers)  NON- Evidenced Based Beta Blocker (for EF% >40%/DIASTOLIC FAILURE): None    Evidenced Based Beta Blocker::(REQUIRED for EF% <40%/SYSTOLIC FAILURE) Metoprolol SUCCinate- Toprol XL  . .................................................................................................................................................. Patient's weights and intake/output reviewed: Yes    Patient's Last Weight: 211 lbs obtained by standing scale. Difference of 24 lbs less than last documented weight. Intake/Output Summary (Last 24 hours) at 3/4/2023 1048  Last data filed at 3/4/2023 0847  Gross per 24 hour   Intake 1235 ml   Output 2750 ml   Net -1515 ml           Comorbidities Reviewed Yes    Patient has no past medical history on file. >>For CHF and Comorbidity documentation on Education Time and Topics, please see Education Tab    Progressive Mobility Assessment:  What is this patient's Current Level of Mobility?: Ambulatory-Up Ad Astrid  How was this patient Mobilized today?: Edge of Bed, Up to Chair,  Up to Toilet/Shower, and Up in Room, ambulated 20 ft                 With Whom? Self                 Level of Difficulty/Assistance: Independent     Pt up in chair at this time on room air. Pt denies shortness of breath. Pt without lower extremity edema.      Patient and/or Family's stated Goal of Care this Admission: increase activity tolerance, better understand heart failure and disease management, and be more comfortable prior to discharge        : Problem: Chronic Conditions and Co-morbidities  Goal: Patient's chronic conditions and co-morbidity symptoms are monitored and maintained or improved  Outcome: Progressing  Note: Pt remains on telemetry for continuous monitoring of heart rate and rhythm. Cardiac medications administered as ordered. Problem: Pain  Goal: Verbalizes/displays adequate comfort level or baseline comfort level  Outcome: Progressing  Note: Pt will be satisfied with pain control. Pt uses numeric pain rating scale with reassessments after pain med administration. Will continue to monitor progression throughout shift. Problem: Safety - Adult  Goal: Free from fall injury  Outcome: Progressing  Note: Pt will remain free from falls throughout hospital stay. Fall precautions in place, bed in lowest position with wheels locked and side rails 2/4 up. Room door open and hourly rounding completed. Will continue to monitor throughout shift.

## 2023-03-04 NOTE — PROGRESS NOTES
Progress Note      PCP: No primary care provider on file. Date of Admission: 3/3/2023    Chief Complaint: Palpitations and shortness of breath    Hospital Course: 65yo male who hasn't seen a PCP in years presents to [facility] with complaints of palpitations on day of arrival. Has had SOB worse with exertion and fatigue for 3 weeks PTA. Denied chest pain, orthopnea, or swelling. Smoker (chronic 0.5 ppd). Chronic alcohol abuse (3-5 beers daily). Denies taking daily medications or blood thinners. Occasionally takes aspirin 81 mg.     ED vitals include , /110. Afebrile, oxygenation appropriate. EKG noted new onset A-fib with RVR, . Trop x3 negative. CXR showed cardiomegaly, bilateral lung infiltrates, bilateral pleural effusions, likely related to pulm edema. CBC, CMP unremarkable but proBNP 3971. CHADsVasc score 1, started on therapeutic lovenox, given lasix x1 and started on Diltiazem gtt. Echo ordered, resulted in EF 15-20% with severe global hypokinesis, mild LVH, and bi-atrial enlargement. Admitted for new-onset A-fib with RVR, new onset CHF (HFrEF 15-20%), and HTN. Subjective: Cardiology was consulted, who d/c Diltiazem gtt. Started on amiodarone gtt to transition to PO. Also started Lasix 20 mg IV, Losartan 25, Toprol 25 mg BID, and NPO Sun night for planned SUSI/CV on Mon 3/6. Continue therapeutic Lovenox. Seen and evaluated sitting up in room. Noted that he did not have any chest pain, SOB, dizziness, lightheadedness currently. Denied abdominal pain, dysuria, or hematuria. Hx: Pt lives alone. Has not seen doctor in years. Will occasionally see urgent care and notes multiple previous visits where a physician has noted his high BP. Usually in SBP 180s but denies ever following up with PCP or taking medication for HTN. Smokes 0.5 ppd and drinks a couple beers nightly since teenage years. Hx of trying recreational drugs when he was younger, none in recent years.  Family hx includes A-fib in mother. Dad  of cardiomyopathy in his 46s. Sister and cousin have congenital heart defect with heart on other side of body per pt. Medications:  Reviewed    Infusion Medications    sodium chloride       Scheduled Medications    amiodarone  400 mg Oral TID    metoprolol succinate  25 mg Oral BID    sodium chloride flush  5-40 mL IntraVENous 2 times per day    enoxaparin  1 mg/kg (Order-Specific) SubCUTAneous BID    nicotine  1 patch TransDERmal Daily    furosemide  20 mg IntraVENous Daily    losartan  25 mg Oral Daily     PRN Meds: sodium chloride flush, sodium chloride, ondansetron **OR** ondansetron, polyethylene glycol, acetaminophen **OR** acetaminophen, perflutren lipid microspheres      Intake/Output Summary (Last 24 hours) at 3/4/2023 1449  Last data filed at 3/4/2023 1431  Gross per 24 hour   Intake 1975 ml   Output 3850 ml   Net -1875 ml       Physical Exam Performed:  BP (!) 136/96   Pulse (!) 126   Temp 97.7 °F (36.5 °C) (Oral)   Resp 17   Ht 6' 2\" (1.88 m)   Wt 211 lb (95.7 kg)   SpO2 97%   BMI 27.09 kg/m²     General appearance: No apparent distress, appears stated age and cooperative. HEENT: Conjunctivae/corneas clear. Neck:  No jugular venous distention. Respiratory:  Normal respiratory effort. Clear to auscultation, bilaterally without Rales/Wheezes/Rhonchi. Cardiovascular: Irregularly irregular rhythm without murmurs, rubs or gallops. Abdomen: Soft, non-tender, non-distended with normal bowel sounds. Musculoskeletal: No clubbing, cyanosis or edema bilaterally. Full range of motion without deformity. Skin: Skin color, texture, turgor normal.  No rashes or lesions.   Neurologic:  No focal neurological deficit  Psychiatric: Alert and oriented, thought content appropriate, normal insight  Capillary Refill: Brisk,3 seconds, normal   Peripheral Pulses: +2 palpable, equal bilaterally       Labs:   Recent Labs     23  0557 23  0711   WBC 8.6 7.0   HGB 14.9 13.6   HCT 44.9 41.4    253     Recent Labs     03/03/23  0557 03/04/23  0710    137   K 4.2 3.9    101   CO2 22 25   BUN 18 13   CREATININE 1.0 0.9   CALCIUM 9.9 8.9     Recent Labs     03/03/23  0557   AST 31   ALT 27   BILITOT 0.5   ALKPHOS 98     Recent Labs     03/04/23  0711   INR 1.10     Recent Labs     03/03/23  0557 03/03/23  1413 03/03/23  1714   TROPONINI <0.01 <0.01 <0.01     Radiology:  XR CHEST PORTABLE   Final Result   1. Cardiomegaly with bilateral lung infiltrates and bilateral pleural   effusions, likely related to pulmonary edema.  Superimposed pneumonia cannot   be excluded.           Assessment/Plan:  Active Hospital Problems    Diagnosis     Atrial fibrillation with rapid ventricular response (HCC) [I48.91]      Priority: Medium     New onset A-fib with RVR  - Admit EKG noted A-fib with RVR, . Trop x3 negative.  - CHADSVASC score 2 (CHF, HTN)  - Cards consulted  - Rhythm-control Amiodarone gtt, to transition to PO  - Rate control Toprol   - Plan for SUSI/CV on Monday 3/6  - Likely long-term anticoag at d/c    New onset Systolic CHF, HFrEF 15-20%  - Admit CXR showed cardiomegaly, bilateral lung infiltrates, bilateral pleural effusions, likely related to pulm edema.  - Admit proBNP 3971  -Likely needs further ischemic evaluation with [facility]; cards consulted  - GDMT: Losartan, Toprol, Lasix    HTN  - Admit /110,   - Cards consulted  - Briefly started on Dilt gtt in ED, d/c'ed d/t reduced EF  - IV Lasix 20 daily  - Losartan 25, Toprol 25 BID  - , a1c 5.4    Tobacco abuse  - Chronic 0.5 ppd smoker since teenager  - Nicotine patch given      DVT Prophylaxis: Lovenox  Diet: ADULT DIET; Regular; Low Sodium (2 gm)  Code Status: Full Code    PT/OT Eval Status: Not yet ordered    Dispo - Pending SUSI/CV on Monday 3/6.    Nery Stern DO    Addendum to Resident  Progress note:  Pt seen,examined and evaluated with resident and discussed regarding POC . I have  reviewed the current history, physical findings, labs and assessment and plan , edited the note where appropriate and agree with note as documented by resident DO ( Dr. Fede Johnson)    Patient seen and examined this morning. Denies any lightheadedness, dizziness, palpitations, chest pain at this time. Continue to be in rapid A-fib. Discussed care plan with patient in detail. Currently on amiodarone and Toprol-XL added per EP. Echo showed severely reduced LVEF 15 to 20%; severe global hypokinesis with regional variation; moderate biatrial enlargement; mild pulmonary hypertension. D/w EP (Dr. Mihai Lopez) this AM regarding need for better heart rate control with digoxin. Dr. Mihai Lopez recommended to continue current care and plan for SUSI/Cardioversion on Monday 3/6/23 . After that would consider ischemic evaluation with possible stress testing as per Dr. Martinez Clemente evaluation on 3/2/23 . Will follow. NPO after Sunday ANASTACIO Mcclendon MD  Hospitalist Physician

## 2023-03-04 NOTE — PROGRESS NOTES
[facility]     Electrophysiology                                     Progress Note    Admission date:  3/3/2023    Reason for follow up visit: AF, CHF    HPI/CC: Annabella Mckeon was admitted on 3/3/2023 with progressive shortness of breath and fatigue. EKG showed rapid AF. Echo showed an EF of 15-20%. Has also been treated for CHF. Rhythm has been AF with rates in the 120's. Subjective: He is feeling better. Less short of breath. Denies chest pain, palpitations or dizziness. Vitals:  Blood pressure (!) 141/102, pulse (!) 122, temperature 97.9 °F (36.6 °C), resp. rate 16, height 6' 2\" (1.88 m), weight 211 lb (95.7 kg), SpO2 94 %.   Temp  Av.9 °F (36.6 °C)  Min: 97.3 °F (36.3 °C)  Max: 98.4 °F (36.9 °C)  Pulse  Av.3  Min: 112  Max: 133  BP  Min: 119/89  Max: 146/117  SpO2  Av.4 %  Min: 94 %  Max: 97 %    24 hour I/O    Intake/Output Summary (Last 24 hours) at 3/4/2023 0834  Last data filed at 3/4/2023 0459  Gross per 24 hour   Intake 995 ml   Output 2350 ml   Net -1355 ml     Current Facility-Administered Medications   Medication Dose Route Frequency Provider Last Rate Last Admin    amiodarone (CORDARONE) tablet 400 mg  400 mg Oral TID LOR Cordova MD        metoprolol succinate (TOPROL XL) extended release tablet 25 mg  25 mg Oral BID LOR Cordova MD        sodium chloride flush 0.9 % injection 5-40 mL  5-40 mL IntraVENous 2 times per day Sina Ellison MD   10 mL at 23 2139    sodium chloride flush 0.9 % injection 5-40 mL  5-40 mL IntraVENous PRN Kacy Lopez MD        0.9 % sodium chloride infusion   IntraVENous PRN Sina Ellison MD        ondansetron (ZOFRAN-ODT) disintegrating tablet 4 mg  4 mg Oral Q8H PRN Sina Ellison MD        Or    ondansetron (ZOFRAN) injection 4 mg  4 mg IntraVENous Q6H PRN Kacy Lopez MD        polyethylene glycol (GLYCOLAX) packet 17 g  17 g Oral Daily PRN Sina Ellison MD        acetaminophen (TYLENOL) tablet 650 mg  650 mg Oral Q6H PRN Katerina Pantoja MD        Or    acetaminophen (TYLENOL) suppository 650 mg  650 mg Rectal Q6H PRN Katerina Pantoja MD        perflutren lipid microspheres (DEFINITY) injection 1.5 mL  1.5 mL IntraVENous ONCE PRN Katerina Pantoja MD        enoxaparin (LOVENOX) injection 105 mg  1 mg/kg (Order-Specific) SubCUTAneous BID Katerina Pantoja MD   105 mg at 03/03/23 2139    nicotine (NICODERM CQ) 14 MG/24HR 1 patch  1 patch TransDERmal Daily Katerina Pantoja MD   1 patch at 03/03/23 1412    furosemide (LASIX) injection 20 mg  20 mg IntraVENous Daily Juan Whitney MD   20 mg at 03/03/23 1720    losartan (COZAAR) tablet 25 mg  25 mg Oral Daily Juan Whitney MD   25 mg at 03/03/23 1718       Objective:     Telemetry monitor: AF    Physical Exam:  Constitutional and general appearance: alert, cooperative, no distress, and appears stated age  HEENT: PERRL, no cervical lymphadenopathy. No masses palpable. Normal oral mucosa  Respiratory:  Normal excursion and expansion without use of accessory muscles  Resp auscultation: Normal breath sounds without wheezing, rhonchi, and rales  Cardiovascular: The apical impulse is not displaced  Heart tones are crisp and normal. irregular S1 and S2.  Jugular venous pulsation Normal  The carotid upstroke is normal in amplitude and contour without delay or bruit  Peripheral pulses are symmetrical and full   Abdomen:  No masses or tenderness  Bowel sounds present  Extremities:   No cyanosis or clubbing   No lower extremity edema   Skin: warm and dry  Neurological:  Alert and oriented  Moves all extremities well  No abnormalities of mood, affect, memory, mentation, or behavior are noted    Data    Echo 3/3/2023:   Technically difficult study due to patient is a smoker and poor acoustic   window. Difficult to assess left ventricular systolic function due to arrhythmia but   appears severely reduced ef 15-20%. Severe global HK with regional variation.    Mild concentric left ventricular hypertrophy.   Left ventricular diastolic filling pressure is elevated.   Mild to moderate mitral regurgitation.   Moderate Bi-atrial enlargement.   Right ventricular systolic function is mildly reduced .   Mild tricuspid regurgitation.   Systolic pulmonic artery pressure (SPAP) is estimated at 41 mmHg consistent   with mild pulmonary hypertension (Right atrial pressure of 8 mmHg).     All labs and testing reviewed.  Lab Review     Renal Profile:   Lab Results   Component Value Date/Time    CREATININE 1.0 03/03/2023 05:57 AM    BUN 18 03/03/2023 05:57 AM     03/03/2023 05:57 AM    K 4.2 03/03/2023 05:57 AM     03/03/2023 05:57 AM    CO2 22 03/03/2023 05:57 AM     CBC:    Lab Results   Component Value Date/Time    WBC 8.6 03/03/2023 05:57 AM    RBC 4.76 03/03/2023 05:57 AM    HGB 14.9 03/03/2023 05:57 AM    HCT 44.9 03/03/2023 05:57 AM    MCV 94.2 03/03/2023 05:57 AM    RDW 14.8 03/03/2023 05:57 AM     03/03/2023 05:57 AM     BNP:  No results found for: BNP  Fasting Lipid Panel:    Lab Results   Component Value Date/Time    CHOL 180 03/03/2023 05:57 AM    HDL 40 03/03/2023 05:57 AM    TRIG 103 03/03/2023 05:57 AM     Cardiac Enzymes:  CK/MbTroponin  Lab Results   Component Value Date/Time    TROPONINI <0.01 03/03/2023 05:14 PM     PT/ INR   Lab Results   Component Value Date/Time    INR 1.10 03/04/2023 07:11 AM    PROTIME 14.1 03/04/2023 07:11 AM     PTT No results found for: PTT   Lab Results   Component Value Date/Time    MG 2.10 03/03/2023 05:57 AM      Lab Results   Component Value Date/Time    TSH 1.41 03/03/2023 05:57 AM       Assessment:  Atrial fibrillation of unknown duration (likely persistent): ongoing    -[id number] score 2 (HTN, CHF)   Moderately dilated left atrium   Shortness of breath: improved  Mild to moderate MR   Acute systolic CHF: ongoing    -has diuresed 1.3 L since admission   -weight appears inaccurate   Cardiomyopathy: unclear etiology, likely  tachycardia mediated    -EF 15-20% on echo 3/2023  HTN: suboptimal   Alcohol and tobacco use         Plan:   1. Continue PO amiodarone  2. Continue IV Lasix   3. Continue therapeutic Lovenox. Will transition to [address] when no further procedures planned. 4. Continue Toprol and losartan (will likely transition to [facility])  5. Strict I&O, daily weight, fluid and sodium restriction  6. Plan for SUSI/CV on Monday 3/6/2023 (risks, benefits, and alternative therapy discussed)  7. Smoking cessation is recommended  8. Outpatient evaluation for suspected sleep apnea   9.  Possible stress test vs angiogram once sinus rhythm is restored       BRANT Lang-CNP  [facility]  [phone]

## 2023-03-05 LAB
ANION GAP SERPL CALCULATED.3IONS-SCNC: 10 MMOL/L (ref 3–16)
BASOPHILS ABSOLUTE: 0.1 K/UL (ref 0–0.2)
BASOPHILS RELATIVE PERCENT: 0.7 %
BUN BLDV-MCNC: 16 MG/DL (ref 7–20)
CALCIUM SERPL-MCNC: 9.2 MG/DL (ref 8.3–10.6)
CHLORIDE BLD-SCNC: 102 MMOL/L (ref 99–110)
CO2: 24 MMOL/L (ref 21–32)
CREAT SERPL-MCNC: 1.1 MG/DL (ref 0.8–1.3)
EOSINOPHILS ABSOLUTE: 0.1 K/UL (ref 0–0.6)
EOSINOPHILS RELATIVE PERCENT: 1.1 %
GFR SERPL CREATININE-BSD FRML MDRD: >60 ML/MIN/{1.73_M2}
GLUCOSE BLD-MCNC: 98 MG/DL (ref 70–99)
HCT VFR BLD CALC: 43 % (ref 40.5–52.5)
HEMOGLOBIN: 14.3 G/DL (ref 13.5–17.5)
LYMPHOCYTES ABSOLUTE: 1.5 K/UL (ref 1–5.1)
LYMPHOCYTES RELATIVE PERCENT: 20.4 %
MCH RBC QN AUTO: 31.6 PG (ref 26–34)
MCHC RBC AUTO-ENTMCNC: 33.2 G/DL (ref 31–36)
MCV RBC AUTO: 95.2 FL (ref 80–100)
MONOCYTES ABSOLUTE: 0.7 K/UL (ref 0–1.3)
MONOCYTES RELATIVE PERCENT: 9.2 %
NEUTROPHILS ABSOLUTE: 5.2 K/UL (ref 1.7–7.7)
NEUTROPHILS RELATIVE PERCENT: 68.6 %
PDW BLD-RTO: 14.4 % (ref 12.4–15.4)
PLATELET # BLD: 260 K/UL (ref 135–450)
PMV BLD AUTO: 8.1 FL (ref 5–10.5)
POTASSIUM REFLEX MAGNESIUM: 4.1 MMOL/L (ref 3.5–5.1)
RBC # BLD: 4.52 M/UL (ref 4.2–5.9)
SODIUM BLD-SCNC: 136 MMOL/L (ref 136–145)
WBC # BLD: 7.5 K/UL (ref 4–11)

## 2023-03-05 PROCEDURE — 6370000000 HC RX 637 (ALT 250 FOR IP): Performed by: INTERNAL MEDICINE

## 2023-03-05 PROCEDURE — 6360000002 HC RX W HCPCS: Performed by: INTERNAL MEDICINE

## 2023-03-05 PROCEDURE — 85025 COMPLETE CBC W/AUTO DIFF WBC: CPT

## 2023-03-05 PROCEDURE — 36415 COLL VENOUS BLD VENIPUNCTURE: CPT

## 2023-03-05 PROCEDURE — 2060000000 HC ICU INTERMEDIATE R&B

## 2023-03-05 PROCEDURE — 6370000000 HC RX 637 (ALT 250 FOR IP): Performed by: NURSE PRACTITIONER

## 2023-03-05 PROCEDURE — 2580000003 HC RX 258: Performed by: INTERNAL MEDICINE

## 2023-03-05 PROCEDURE — 80048 BASIC METABOLIC PNL TOTAL CA: CPT

## 2023-03-05 PROCEDURE — 99233 SBSQ HOSP IP/OBS HIGH 50: CPT | Performed by: NURSE PRACTITIONER

## 2023-03-05 RX ORDER — LORAZEPAM 0.5 MG/1
0.5 TABLET ORAL EVERY 4 HOURS PRN
Status: COMPLETED | OUTPATIENT
Start: 2023-03-05 | End: 2023-03-06

## 2023-03-05 RX ADMIN — AMIODARONE HYDROCHLORIDE 400 MG: 200 TABLET ORAL at 15:05

## 2023-03-05 RX ADMIN — ENOXAPARIN SODIUM 105 MG: 150 INJECTION SUBCUTANEOUS at 10:22

## 2023-03-05 RX ADMIN — Medication 10 ML: at 10:22

## 2023-03-05 RX ADMIN — LORAZEPAM 0.5 MG: 0.5 TABLET ORAL at 20:40

## 2023-03-05 RX ADMIN — LOSARTAN POTASSIUM 25 MG: 25 TABLET, FILM COATED ORAL at 10:22

## 2023-03-05 RX ADMIN — ENOXAPARIN SODIUM 105 MG: 150 INJECTION SUBCUTANEOUS at 20:40

## 2023-03-05 RX ADMIN — METOPROLOL SUCCINATE 25 MG: 25 TABLET, EXTENDED RELEASE ORAL at 20:39

## 2023-03-05 RX ADMIN — METOPROLOL SUCCINATE 25 MG: 25 TABLET, EXTENDED RELEASE ORAL at 10:22

## 2023-03-05 RX ADMIN — AMIODARONE HYDROCHLORIDE 400 MG: 200 TABLET ORAL at 20:39

## 2023-03-05 RX ADMIN — FUROSEMIDE 20 MG: 10 INJECTION, SOLUTION INTRAMUSCULAR; INTRAVENOUS at 10:27

## 2023-03-05 RX ADMIN — AMIODARONE HYDROCHLORIDE 400 MG: 200 TABLET ORAL at 10:22

## 2023-03-05 RX ADMIN — Medication 10 ML: at 20:40

## 2023-03-05 NOTE — PROGRESS NOTES
Progress Note      PCP: No primary care provider on file. Date of Admission: 3/3/2023    Chief Complaint: Palpitations and shortness of breath    Hospital Course:   57 y/o male who hasn't seen a PCP in years [facility] with complaints of palpitations on day of arrival. Has had SOB worse with exertion and fatigue for 3 weeks PTA. Denied chest pain, orthopnea, or swelling. Smoker (chronic 0.5 ppd). Chronic alcohol abuse (3-5 beers daily). Denies taking daily medications or blood thinners. Occasionally takes aspirin 81 mg.     ED vitals include , /110. Afebrile, oxygenation appropriate. EKG noted new onset A-fib with RVR, . Trop x3 negative. CXR showed cardiomegaly, bilateral lung infiltrates, bilateral pleural effusions, likely related to pulm edema. CBC, CMP unremarkable but proBNP 3971. CHADsVasc score 1, started on therapeutic lovenox, given lasix x1 and started on Diltiazem gtt. Echo ordered, resulted in EF 15-20% with severe global hypokinesis, mild LVH, and bi-atrial enlargement. Admitted for new-onset A-fib with RVR, new onset CHF (HFrEF 15-20%), and HTN. Cardiology was consulted and is following. Subjective:   Pt seen at bedside this AM eating breakfast. No acute events reported overnight and pt does not have acute complaints this AM. 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 Denies CP, SOB, N/V/D, headaches, lightheadedness/dizziness, vision changes today.      Medications:  Reviewed    Infusion Medications    sodium chloride       Scheduled Medications    amiodarone  400 mg Oral TID    metoprolol succinate  25 mg Oral BID    sodium chloride flush  5-40 mL IntraVENous 2 times per day    enoxaparin  1 mg/kg (Order-Specific) SubCUTAneous BID    nicotine  1 patch TransDERmal Daily    furosemide  20 mg IntraVENous Daily    losartan  25 mg Oral Daily     PRN Meds: sodium chloride flush, sodium chloride, ondansetron **OR** ondansetron, polyethylene glycol, acetaminophen **OR** acetaminophen, perflutren lipid microspheres      Intake/Output Summary (Last 24 hours) at 3/5/2023 0918  Last data filed at 3/5/2023 0820  Gross per 24 hour   Intake 1220 ml   Output 3025 ml   Net -1805 ml       Physical Exam Performed:  BP (!) 135/103   Pulse (!) 116   Temp 97.9 °F (36.6 °C) (Oral)   Resp 18   Ht 6' 2\" (1.88 m)   Wt 209 lb 9.6 oz (95.1 kg)   SpO2 98%   BMI 26.91 kg/m²     General appearance: No apparent distress, appears stated age and cooperative. HEENT: Conjunctivae/corneas clear. Neck:  No jugular venous distention. Respiratory:  Normal respiratory effort. Clear to auscultation, bilaterally without Rales/Wheezes/Rhonchi. Cardiovascular: Irregularly irregular rhythm without murmurs, rubs or gallops. Abdomen: Soft, non-tender, non-distended   Musculoskeletal: No clubbing, cyanosis or edema bilaterally. Full range of motion without deformity. Skin: Skin color, texture, turgor normal.  No rashes or lesions. Neurologic:  No focal neurological deficit  Psychiatric: Alert and oriented, thought content appropriate, normal insight      Labs:   Recent Labs     03/03/23  0557 03/04/23  0711 03/05/23  0614   WBC 8.6 7.0 7.5   HGB 14.9 13.6 14.3   HCT 44.9 41.4 43.0    253 260     Recent Labs     03/03/23  0557 03/04/23  0710 03/05/23  0614    137 136   K 4.2 3.9 4.1    101 102   CO2 22 25 24   BUN 18 13 16   CREATININE 1.0 0.9 1.1   CALCIUM 9.9 8.9 9.2     Recent Labs     03/03/23  0557   AST 31   ALT 27   BILITOT 0.5   ALKPHOS 98     Recent Labs     03/04/23  0711   INR 1.10     Recent Labs     03/03/23  0557 03/03/23  1413 03/03/23  1714   TROPONINI <0.01 <0.01 <0.01     Radiology:  XR CHEST PORTABLE   Final Result   1. Cardiomegaly with bilateral lung infiltrates and bilateral pleural   effusions, likely related to pulmonary edema. Superimposed pneumonia cannot   be excluded.            Assessment/Plan:  Active Hospital Problems Diagnosis     Atrial fibrillation with rapid ventricular response (HCC) [I48.91]      Priority: Medium       New onset A-fib with RVR  - Admit EKG noted A-fib with RVR, . - Troponin x3 negative. - CHADSVASC score 2 (CHF, HTN)  - Likely needs long-term anticoagulation at discharge  - Rhythm unchanged today. -117 in AM. On Tele monitoring.   - Cardiology/EP following. Their recommendations appreciated:    - Amiodarone 400 mg TID PO  - Therapeutic Lovenox dosing with plans to transition to [address] after procedures   - Continue Toprol   - SUSI/CV on 3/6/23 planned. New onset Systolic CHF, HFrEF 66-64%  - Admit CXR showed cardiomegaly, bilateral lung infiltrates, bilateral pleural effusions, likely related to pulm edema.  - Admit pro-BNP 3971  - Echo on 3/3/23 showed EF of 15-20% with severe global hypokinesis. - Continue with GDMT below. - Cardiology following. Their recommendations appreciated:   -  IV Lasix 20 mg with plans to transition to PO tomorrow    - Continue Toprol and Losartan (with plans to transition to [facility])  - Strict I&Os and daily weight  - Low Sodium Diet. NPO after midnight for procedure on 3/6/23   - Potential Stress test vs. Cath after rhythm restored     HTN  - Admit /110,   - Cardiology following. Their recommendation appreciated. See above. - Briefly started on Dilt gtt in ED, d/c'ed d/t reduced EF  - IV Lasix 20 daily for now. Plans to switch to PO.   - Losartan 25, Toprol 25 BID  - , a1c 5.4  - Likely needs outpatient Pulm/Sleep Med f/u for CECILE eval per Cardiology note. Tobacco abuse  - Chronic 0.5 ppd smoker since teenager  - Nicotine patch given  - Continued to encourage tobacco cessation efforts. DVT Prophylaxis: Lovenox    Diet: ADULT DIET; Regular; Low Sodium (2 gm). NPO after midnight. Code Status: Full Code    PT/OT Eval Status: Not yet ordered    Dispo - Pending SUSI/CV on Monday 3/6.     Adolfo Duron, DO  PGY-1, Family Medicine    Addendum to Resident  Progress note:  Pt seen,examined and evaluated with resident and discussed regarding POC . I have reviewed the current history, physical findings, labs and assessment and plan , edited the note where appropriate and agree with note as documented by resident DO ( Dr. Terell Best)     Patient seen and examined this morning. Denies any lightheadedness, dizziness, palpitations, chest pain at this time. Continue to be in rapid A-fib. Discussed care plan with patient in detail. Currently on amiodarone and Toprol-XL added per EP. Echo showed severely reduced LVEF 15 to 20%; severe global hypokinesis with regional variation; moderate biatrial enlargement; mild pulmonary hypertension. EP plan   SUSI/Cardioversion on Monday 3/6/23 . After that would consider ischemic evaluation with possible stress testing vs [facility]   . Will follow. NPO after  ANASTACIO Wilhelm Arm, MD  Hospitalist Physician

## 2023-03-05 NOTE — PROGRESS NOTES
[address]     Electrophysiology                                     Progress Note    Admission date:  3/3/2023    Reason for follow up visit: AF, CHF    HPI/CC: Arbutus Hashimoto was admitted on 3/3/2023 with progressive shortness of breath and fatigue. EKG showed rapid AF. Echo showed an EF of 15-20%. Has also been treated for CHF. Rhythm has been AF with rates in the 120's. Subjective: He has some occasional shortness of breath. He is feeling better. Denies chest pain, palpitations, or dizziness. Vitals:  Blood pressure (!) 135/103, pulse (!) 116, temperature 97.9 °F (36.6 °C), temperature source Oral, resp. rate 18, height 6' 2\" (1.88 m), weight 209 lb 9.6 oz (95.1 kg), SpO2 98 %.   Temp  Av °F (36.7 °C)  Min: 97.7 °F (36.5 °C)  Max: 98.4 °F (36.9 °C)  Pulse  Av  Min: 115  Max: 126  BP  Min: 123/95  Max: 136/96  SpO2  Av.5 %  Min: 95 %  Max: 98 %    24 hour I/O    Intake/Output Summary (Last 24 hours) at 3/5/2023 1011  Last data filed at 3/5/2023 0820  Gross per 24 hour   Intake 1220 ml   Output 3025 ml   Net -1805 ml       Current Facility-Administered Medications   Medication Dose Route Frequency Provider Last Rate Last Admin    amiodarone (CORDARONE) tablet 400 mg  400 mg Oral TID LOR Davila MD   400 mg at 23    metoprolol succinate (TOPROL XL) extended release tablet 25 mg  25 mg Oral BID LOR Davila MD   25 mg at 23    sodium chloride flush 0.9 % injection 5-40 mL  5-40 mL IntraVENous 2 times per day Jose Angel Horn MD   10 mL at 23    sodium chloride flush 0.9 % injection 5-40 mL  5-40 mL IntraVENous PRN Kacy Lopez MD        0.9 % sodium chloride infusion   IntraVENous PRN Jose Angel Horn MD        ondansetron (ZOFRAN-ODT) disintegrating tablet 4 mg  4 mg Oral Q8H PRN Jose Angel Horn MD        Or    ondansetron (ZOFRAN) injection 4 mg  4 mg IntraVENous Q6H PRN Jose Angel Horn MD        polyethylene glycol (GLYCOLAX) packet 17 g  17 g Oral Daily PRN Jessika Livingston MD        acetaminophen (TYLENOL) tablet 650 mg  650 mg Oral Q6H PRN Jessika Livingston MD        Or    acetaminophen (TYLENOL) suppository 650 mg  650 mg Rectal Q6H PRN Jessika Livingston MD        perflutren lipid microspheres (DEFINITY) injection 1.5 mL  1.5 mL IntraVENous ONCE PRN Jessika Livingston MD        enoxaparin (LOVENOX) injection 105 mg  1 mg/kg (Order-Specific) SubCUTAneous BID Jessika Livingston MD   105 mg at 03/04/23 2002    nicotine (NICODERM CQ) 14 MG/24HR 1 patch  1 patch TransDERmal Daily Jessika Livingston MD   1 patch at 03/04/23 0843    furosemide (LASIX) injection 20 mg  20 mg IntraVENous Daily Arabella Thornton MD   20 mg at 03/04/23 0842    losartan (COZAAR) tablet 25 mg  25 mg Oral Daily Arabella Thornton MD   25 mg at 03/04/23 6544       Objective:     Telemetry monitor: AF    Physical Exam:  Constitutional and general appearance: alert, cooperative, no distress, and appears stated age  [de-identified]: PERRL, no cervical lymphadenopathy. No masses palpable. Normal oral mucosa  Respiratory:  Normal excursion and expansion without use of accessory muscles  Resp auscultation: Normal breath sounds without wheezing, rhonchi, and rales  Cardiovascular: The apical impulse is not displaced  Heart tones are crisp and normal. irregular S1 and S2.  Jugular venous pulsation Normal  The carotid upstroke is normal in amplitude and contour without delay or bruit  Peripheral pulses are symmetrical and full   Abdomen:  No masses or tenderness  Bowel sounds present  Extremities:   No cyanosis or clubbing   No lower extremity edema   Skin: warm and dry  Neurological:  Alert and oriented  Moves all extremities well  No abnormalities of mood, affect, memory, mentation, or behavior are noted    Data    Echo 3/3/2023:   Technically difficult study due to patient is a smoker and poor acoustic   window.    Difficult to assess left ventricular systolic function due to arrhythmia but   appears severely reduced ef 15-20%. Severe global HK with regional variation. Mild concentric left ventricular hypertrophy. Left ventricular diastolic filling pressure is elevated. Mild to moderate mitral regurgitation. Moderate Bi-atrial enlargement. Right ventricular systolic function is mildly reduced . Mild tricuspid regurgitation. Systolic pulmonic artery pressure (SPAP) is estimated at 41 mmHg consistent   with mild pulmonary hypertension (Right atrial pressure of 8 mmHg). All labs and testing reviewed.   Lab Review     Renal Profile:   Lab Results   Component Value Date/Time    CREATININE 1.1 03/05/2023 06:14 AM    BUN 16 03/05/2023 06:14 AM     03/05/2023 06:14 AM    K 4.1 03/05/2023 06:14 AM     03/05/2023 06:14 AM    CO2 24 03/05/2023 06:14 AM     CBC:    Lab Results   Component Value Date/Time    WBC 7.5 03/05/2023 06:14 AM    RBC 4.52 03/05/2023 06:14 AM    HGB 14.3 03/05/2023 06:14 AM    HCT 43.0 03/05/2023 06:14 AM    MCV 95.2 03/05/2023 06:14 AM    RDW 14.4 03/05/2023 06:14 AM     03/05/2023 06:14 AM     BNP:  No results found for: BNP  Fasting Lipid Panel:    Lab Results   Component Value Date/Time    CHOL 180 03/03/2023 05:57 AM    HDL 40 03/03/2023 05:57 AM    TRIG 103 03/03/2023 05:57 AM     Cardiac Enzymes:  CK/MbTroponin  Lab Results   Component Value Date/Time    TROPONINI <0.01 03/03/2023 05:14 PM     PT/ INR   Lab Results   Component Value Date/Time    INR 1.10 03/04/2023 07:11 AM    PROTIME 14.1 03/04/2023 07:11 AM     PTT No results found for: PTT   Lab Results   Component Value Date/Time    MG 2.10 03/03/2023 05:57 AM      Lab Results   Component Value Date/Time    TSH 1.41 03/03/2023 05:57 AM       Assessment:  Atrial fibrillation of unknown duration (likely persistent): ongoing    -[id number] score 2 (HTN, CHF)   Moderately dilated left atrium   Shortness of breath: improved  Mild to moderate MR   Acute systolic CHF: ongoing    -has diuresed 3 L since admission   -weight appears inaccurate   Cardiomyopathy: unclear etiology, likely tachycardia mediated    -EF 15-20% on echo 3/2023  HTN: suboptimal   Alcohol and tobacco use         Plan:   1. Continue PO amiodarone  2. Continue IV Lasix. Likely transition to PO tomorrow. 3. Continue therapeutic Lovenox. Will transition to [address] when no further procedures planned. 4. Continue Toprol and losartan (will likely transition to [facility])  5. Strict I&O, daily weight, fluid and sodium restriction  6. Plan for SUSI/CV on Monday 3/6/2023 (risks, benefits, and alternative therapy discussed). NPO at midnight. 7. Smoking cessation is recommended  8. Outpatient evaluation for suspected sleep apnea   9.  Possible stress test vs angiogram once sinus rhythm is restored       Ramon Vaughn, [address]  [phone]

## 2023-03-05 NOTE — PLAN OF CARE
Patient's EF (Ejection Fraction) is less than 40%    Heart Failure Medications:  Diuretics[de-identified] Furosemide    (One of the following REQUIRED for EF </= 40%/SYSTOLIC FAILURE but MAY be used in EF% >40%/DIASTOLIC FAILURE)        ACE[de-identified] None        ARB[de-identified] Losartan         ARNI[de-identified] None    (Beta Blockers)  NON- Evidenced Based Beta Blocker (for EF% >40%/DIASTOLIC FAILURE): None    Evidenced Based Beta Blocker::(REQUIRED for EF% <40%/SYSTOLIC FAILURE) Metoprolol SUCCinate- Toprol XL  . .................................................................................................................................................. Patient's weights and intake/output reviewed: Yes    Patient's Last Weight: 211 lbs obtained by standing scale. Difference of 24 lbs less than last documented weight. Intake/Output Summary (Last 24 hours) at 3/4/2023 2221  Last data filed at 3/4/2023 2219  Gross per 24 hour   Intake 2455 ml   Output 3425 ml   Net -970 ml       Daily Weight log at bedside and being used: [employer] Provided: yes    Comorbidities Reviewed Yes    Patient has no past medical history on file. >>For CHF and Comorbidity documentation on Education Time and Topics, please see Education Tab    Progressive Mobility Assessment:  What is this patient's Current Level of Mobility?: Ambulatory-Up Ad Astrid  How was this patient Mobilized today?: Up in Room, ambulated 10 ft                 With Whom? Self                 Level of Difficulty/Assistance: Independent     Pt resting in bed at this time on room air. Pt denies shortness of breath. Pt with nonpitting lower extremity edema.      Patient and/or Family's stated Goal of Care this Admission: reduce shortness of breath, increase activity tolerance, better understand heart failure and disease management, be more comfortable, and reduce lower extremity edema prior to discharge        :

## 2023-03-05 NOTE — PLAN OF CARE
Problem: Discharge Planning  Goal: Discharge to home or other facility with appropriate resources  Outcome: Progressing  Note:   Patient's EF (Ejection Fraction) is less than 40%    Heart Failure Medications:  Diuretics[de-identified] Furosemide    (One of the following REQUIRED for EF </= 40%/SYSTOLIC FAILURE but MAY be used in EF% >40%/DIASTOLIC FAILURE)        ACE[de-identified] None        ARB[de-identified] Losartan         ARNI[de-identified] None    (Beta Blockers)  NON- Evidenced Based Beta Blocker (for EF% >40%/DIASTOLIC FAILURE): None    Evidenced Based Beta Blocker::(REQUIRED for EF% <40%/SYSTOLIC FAILURE) Metoprolol SUCCinate- Toprol XL  . .................................................................................................................................................. Patient's weights and intake/output reviewed: Yes    Patient's Last Weight: 209 lbs obtained by standing scale. Difference of 2 lbs less than last documented weight. Intake/Output Summary (Last 24 hours) at 3/5/2023 1156  Last data filed at 3/5/2023 1030  Gross per 24 hour   Intake 1220 ml   Output 2625 ml   Net -1405 ml       Daily Weight log at bedside and being used: no    Education Booklet Provided: no    Comorbidities Reviewed Yes    Patient has no past medical history on file. >>For CHF and Comorbidity documentation on Education Time and Topics, please see Education Tab    Progressive Mobility Assessment:  What is this patient's Current Level of Mobility?: Ambulatory-Up Ad Astrid  How was this patient Mobilized today?: Edge of Bed, Up to Chair,  Up to Toilet/Shower, and Up in Room, ambulated 20 ft                 With Whom? Self                 Level of Difficulty/Assistance: Independent     Pt resting in bed at this time on room air. Pt denies shortness of breath. Pt without lower extremity edema.      Patient and/or Family's stated Goal of Care this Admission: increase activity tolerance, better understand heart failure and disease management, and be more comfortable prior to discharge        :      Problem: Chronic Conditions and Co-morbidities  Goal: Patient's chronic conditions and co-morbidity symptoms are monitored and maintained or improved  Outcome: Progressing  Note: Pt remains on telemetry for continuous monitoring of heart rate and rhythm. Cardiac medications administered as ordered. Problem: Pain  Goal: Verbalizes/displays adequate comfort level or baseline comfort level  Outcome: Progressing  Note: Pt will be satisfied with pain control. Pt uses numeric pain rating scale with reassessments after pain med administration. Will continue to monitor progression throughout shift. Problem: Safety - Adult  Goal: Free from fall injury  3/5/2023 1156 by Meliton Lopez RN  Outcome: Progressing  Note: Pt will remain free from falls throughout hospital stay. Fall precautions in place, bed in lowest position with wheels locked and side rails 2/4 up. Room door open and hourly rounding completed. Will continue to monitor throughout shift.

## 2023-03-05 NOTE — PLAN OF CARE
Problem: Safety - Adult  Goal: Free from fall injury  3/4/2023 2224 by Joan Crenshaw RN  Outcome: Progressing  Note: Pt at risk for accidental injury. Pt will remain free from injury during admission. Pt educated on the use of the call light and the need to have an active bed alarm. Pt will call out prior to ambulation. Pt's pathway will remain free from tripping hazards. Pt's bed is in lowest position and call light in reach. Pt has no further needs at this time. Will continue to monitor.

## 2023-03-06 ENCOUNTER — APPOINTMENT (OUTPATIENT)
Dept: CARDIAC CATH/INVASIVE PROCEDURES | Age: 63
DRG: 215 | End: 2023-03-06
Payer: COMMERCIAL

## 2023-03-06 ENCOUNTER — ANESTHESIA EVENT (OUTPATIENT)
Dept: CARDIAC CATH/INVASIVE PROCEDURES | Age: 63
End: 2023-03-06
Payer: COMMERCIAL

## 2023-03-06 ENCOUNTER — ANESTHESIA (OUTPATIENT)
Dept: CARDIAC CATH/INVASIVE PROCEDURES | Age: 63
End: 2023-03-06
Payer: COMMERCIAL

## 2023-03-06 PROBLEM — I50.21 ACUTE SYSTOLIC HEART FAILURE (HCC): Status: ACTIVE | Noted: 2023-03-06

## 2023-03-06 PROBLEM — I42.9 CARDIOMYOPATHY (HCC): Status: ACTIVE | Noted: 2023-03-06

## 2023-03-06 PROBLEM — I10 UNCONTROLLED HYPERTENSION: Status: ACTIVE | Noted: 2023-03-06

## 2023-03-06 LAB
ANION GAP SERPL CALCULATED.3IONS-SCNC: 12 MMOL/L (ref 3–16)
BASOPHILS ABSOLUTE: 0 K/UL (ref 0–0.2)
BASOPHILS RELATIVE PERCENT: 0.4 %
BUN BLDV-MCNC: 17 MG/DL (ref 7–20)
CALCIUM SERPL-MCNC: 9.5 MG/DL (ref 8.3–10.6)
CHLORIDE BLD-SCNC: 99 MMOL/L (ref 99–110)
CO2: 24 MMOL/L (ref 21–32)
CREAT SERPL-MCNC: 1 MG/DL (ref 0.8–1.3)
EKG ATRIAL RATE: 69 BPM
EKG DIAGNOSIS: NORMAL
EKG P AXIS: 36 DEGREES
EKG P-R INTERVAL: 172 MS
EKG Q-T INTERVAL: 530 MS
EKG QRS DURATION: 102 MS
EKG QTC CALCULATION (BAZETT): 567 MS
EKG R AXIS: 18 DEGREES
EKG T AXIS: 109 DEGREES
EKG VENTRICULAR RATE: 69 BPM
EOSINOPHILS ABSOLUTE: 0.1 K/UL (ref 0–0.6)
EOSINOPHILS RELATIVE PERCENT: 0.6 %
GFR SERPL CREATININE-BSD FRML MDRD: >60 ML/MIN/{1.73_M2}
GLUCOSE BLD-MCNC: 97 MG/DL (ref 70–99)
HCT VFR BLD CALC: 45 % (ref 40.5–52.5)
HEMOGLOBIN: 14.9 G/DL (ref 13.5–17.5)
LV EF: 20 %
LVEF MODALITY: NORMAL
LYMPHOCYTES ABSOLUTE: 1.3 K/UL (ref 1–5.1)
LYMPHOCYTES RELATIVE PERCENT: 14.3 %
MCH RBC QN AUTO: 31.5 PG (ref 26–34)
MCHC RBC AUTO-ENTMCNC: 33.2 G/DL (ref 31–36)
MCV RBC AUTO: 95.1 FL (ref 80–100)
MONOCYTES ABSOLUTE: 0.8 K/UL (ref 0–1.3)
MONOCYTES RELATIVE PERCENT: 8.6 %
NEUTROPHILS ABSOLUTE: 6.7 K/UL (ref 1.7–7.7)
NEUTROPHILS RELATIVE PERCENT: 76.1 %
PDW BLD-RTO: 14.7 % (ref 12.4–15.4)
PLATELET # BLD: 264 K/UL (ref 135–450)
PMV BLD AUTO: 8.5 FL (ref 5–10.5)
POTASSIUM REFLEX MAGNESIUM: 4.3 MMOL/L (ref 3.5–5.1)
RBC # BLD: 4.74 M/UL (ref 4.2–5.9)
SODIUM BLD-SCNC: 135 MMOL/L (ref 136–145)
WBC # BLD: 8.9 K/UL (ref 4–11)

## 2023-03-06 PROCEDURE — 80048 BASIC METABOLIC PNL TOTAL CA: CPT

## 2023-03-06 PROCEDURE — 93005 ELECTROCARDIOGRAM TRACING: CPT | Performed by: INTERNAL MEDICINE

## 2023-03-06 PROCEDURE — 93312 ECHO TRANSESOPHAGEAL: CPT

## 2023-03-06 PROCEDURE — 85025 COMPLETE CBC W/AUTO DIFF WBC: CPT

## 2023-03-06 PROCEDURE — 6360000002 HC RX W HCPCS: Performed by: INTERNAL MEDICINE

## 2023-03-06 PROCEDURE — 6370000000 HC RX 637 (ALT 250 FOR IP): Performed by: NURSE PRACTITIONER

## 2023-03-06 PROCEDURE — 3700000001 HC ADD 15 MINUTES (ANESTHESIA)

## 2023-03-06 PROCEDURE — 2580000003 HC RX 258: Performed by: INTERNAL MEDICINE

## 2023-03-06 PROCEDURE — 92960 CARDIOVERSION ELECTRIC EXT: CPT

## 2023-03-06 PROCEDURE — 5A2204Z RESTORATION OF CARDIAC RHYTHM, SINGLE: ICD-10-PCS | Performed by: INTERNAL MEDICINE

## 2023-03-06 PROCEDURE — 3700000000 HC ANESTHESIA ATTENDED CARE

## 2023-03-06 PROCEDURE — 93325 DOPPLER ECHO COLOR FLOW MAPG: CPT

## 2023-03-06 PROCEDURE — 6370000000 HC RX 637 (ALT 250 FOR IP): Performed by: INTERNAL MEDICINE

## 2023-03-06 PROCEDURE — 6360000002 HC RX W HCPCS

## 2023-03-06 PROCEDURE — 2500000003 HC RX 250 WO HCPCS

## 2023-03-06 PROCEDURE — 92960 CARDIOVERSION ELECTRIC EXT: CPT | Performed by: INTERNAL MEDICINE

## 2023-03-06 PROCEDURE — 93010 ELECTROCARDIOGRAM REPORT: CPT | Performed by: INTERNAL MEDICINE

## 2023-03-06 PROCEDURE — 99233 SBSQ HOSP IP/OBS HIGH 50: CPT | Performed by: NURSE PRACTITIONER

## 2023-03-06 PROCEDURE — 2580000003 HC RX 258

## 2023-03-06 PROCEDURE — 93320 DOPPLER ECHO COMPLETE: CPT

## 2023-03-06 PROCEDURE — 36415 COLL VENOUS BLD VENIPUNCTURE: CPT

## 2023-03-06 PROCEDURE — B246ZZ4 ULTRASONOGRAPHY OF RIGHT AND LEFT HEART, TRANSESOPHAGEAL: ICD-10-PCS | Performed by: INTERNAL MEDICINE

## 2023-03-06 PROCEDURE — 2060000000 HC ICU INTERMEDIATE R&B

## 2023-03-06 RX ORDER — SODIUM CHLORIDE 0.9 % (FLUSH) 0.9 %
5-40 SYRINGE (ML) INJECTION PRN
Status: DISCONTINUED | OUTPATIENT
Start: 2023-03-06 | End: 2023-03-10

## 2023-03-06 RX ORDER — SODIUM CHLORIDE 9 MG/ML
INJECTION, SOLUTION INTRAVENOUS PRN
Status: DISCONTINUED | OUTPATIENT
Start: 2023-03-06 | End: 2023-03-10

## 2023-03-06 RX ORDER — LIDOCAINE HYDROCHLORIDE 20 MG/ML
INJECTION, SOLUTION INFILTRATION; PERINEURAL PRN
Status: DISCONTINUED | OUTPATIENT
Start: 2023-03-06 | End: 2023-03-06 | Stop reason: SDUPTHER

## 2023-03-06 RX ORDER — SODIUM CHLORIDE 0.9 % (FLUSH) 0.9 %
5-40 SYRINGE (ML) INJECTION EVERY 12 HOURS SCHEDULED
Status: DISCONTINUED | OUTPATIENT
Start: 2023-03-06 | End: 2023-03-10

## 2023-03-06 RX ORDER — PROPOFOL 10 MG/ML
INJECTION, EMULSION INTRAVENOUS PRN
Status: DISCONTINUED | OUTPATIENT
Start: 2023-03-06 | End: 2023-03-06 | Stop reason: SDUPTHER

## 2023-03-06 RX ORDER — SPIRONOLACTONE 25 MG/1
25 TABLET ORAL DAILY
Status: DISCONTINUED | OUTPATIENT
Start: 2023-03-06 | End: 2023-03-14 | Stop reason: HOSPADM

## 2023-03-06 RX ORDER — ONDANSETRON 2 MG/ML
4 INJECTION INTRAMUSCULAR; INTRAVENOUS EVERY 6 HOURS PRN
Status: DISCONTINUED | OUTPATIENT
Start: 2023-03-06 | End: 2023-03-06 | Stop reason: SDUPTHER

## 2023-03-06 RX ORDER — LORAZEPAM 0.5 MG/1
0.5 TABLET ORAL
Status: ACTIVE | OUTPATIENT
Start: 2023-03-06 | End: 2023-03-07

## 2023-03-06 RX ORDER — AMIODARONE HYDROCHLORIDE 200 MG/1
400 TABLET ORAL 2 TIMES DAILY
Status: DISCONTINUED | OUTPATIENT
Start: 2023-03-06 | End: 2023-03-12

## 2023-03-06 RX ADMIN — FUROSEMIDE 20 MG: 10 INJECTION, SOLUTION INTRAMUSCULAR; INTRAVENOUS at 09:18

## 2023-03-06 RX ADMIN — Medication 10 ML: at 09:19

## 2023-03-06 RX ADMIN — SPIRONOLACTONE 25 MG: 25 TABLET, FILM COATED ORAL at 16:24

## 2023-03-06 RX ADMIN — METOPROLOL SUCCINATE 25 MG: 25 TABLET, EXTENDED RELEASE ORAL at 20:17

## 2023-03-06 RX ADMIN — PROPOFOL 200 MG: 10 INJECTION, EMULSION INTRAVENOUS at 13:40

## 2023-03-06 RX ADMIN — AMIODARONE HYDROCHLORIDE 400 MG: 200 TABLET ORAL at 20:17

## 2023-03-06 RX ADMIN — AMIODARONE HYDROCHLORIDE 400 MG: 200 TABLET ORAL at 09:18

## 2023-03-06 RX ADMIN — DEXMEDETOMIDINE HYDROCHLORIDE 20 MCG: 100 INJECTION, SOLUTION INTRAVENOUS at 13:38

## 2023-03-06 RX ADMIN — Medication 10 ML: at 20:20

## 2023-03-06 RX ADMIN — LOSARTAN POTASSIUM 25 MG: 25 TABLET, FILM COATED ORAL at 09:18

## 2023-03-06 RX ADMIN — ENOXAPARIN SODIUM 105 MG: 150 INJECTION SUBCUTANEOUS at 20:17

## 2023-03-06 RX ADMIN — LORAZEPAM 0.5 MG: 0.5 TABLET ORAL at 01:08

## 2023-03-06 RX ADMIN — ENOXAPARIN SODIUM 105 MG: 150 INJECTION SUBCUTANEOUS at 09:18

## 2023-03-06 RX ADMIN — METOPROLOL SUCCINATE 25 MG: 25 TABLET, EXTENDED RELEASE ORAL at 09:18

## 2023-03-06 RX ADMIN — Medication 10 ML: at 20:17

## 2023-03-06 RX ADMIN — LIDOCAINE HYDROCHLORIDE 100 MG: 20 INJECTION, SOLUTION INFILTRATION; PERINEURAL at 13:39

## 2023-03-06 ASSESSMENT — LIFESTYLE VARIABLES: SMOKING_STATUS: 1

## 2023-03-06 ASSESSMENT — PAIN SCALES - GENERAL: PAINLEVEL_OUTOF10: 0

## 2023-03-06 NOTE — PROGRESS NOTES
[facility]     Electrophysiology                                     Progress Note    Admission date:  3/3/2023    Reason for follow up visit: AF, CHF    HPI/CC: Tavo Clemons was admitted on 3/3/2023 with progressive shortness of breath and fatigue. EKG showed rapid AF. Echo showed an EF of 15-20%. Has also been treated for CHF. Rhythm has been AF with rates in the low 100's. Subjective: He is feeling well. Denies chest pain, palpitations, shortness of breath, and dizziness. Vitals:  Blood pressure (!) 138/110, pulse (!) 111, temperature 97.9 °F (36.6 °C), temperature source Oral, resp. rate 18, height 6' 2\" (1.88 m), weight 210 lb (95.3 kg), SpO2 96 %.   Temp  Av.9 °F (36.6 °C)  Min: 97.5 °F (36.4 °C)  Max: 98.1 °F (36.7 °C)  Pulse  Av.5  Min: 66  Max: 131  BP  Min: 119/106  Max: 138/110  SpO2  Av.5 %  Min: 92 %  Max: 98 %    24 hour I/O    Intake/Output Summary (Last 24 hours) at 3/6/2023 1029  Last data filed at 3/6/2023 0142  Gross per 24 hour   Intake 1480 ml   Output 1945 ml   Net -465 ml       Current Facility-Administered Medications   Medication Dose Route Frequency Provider Last Rate Last Admin    sodium chloride flush 0.9 % injection 5-40 mL  5-40 mL IntraVENous 2 times per day Candance Hose, MD        sodium chloride flush 0.9 % injection 5-40 mL  5-40 mL IntraVENous PRN Candance Hose, MD        0.9 % sodium chloride infusion   IntraVENous PRN Candance Hose, MD        LORazepam (ATIVAN) tablet 0.5 mg  0.5 mg Oral Once PRN Candance Hose, MD        amiodarone (CORDARONE) tablet 400 mg  400 mg Oral TID LOR Rendon MD   400 mg at 23 0052    metoprolol succinate (TOPROL XL) extended release tablet 25 mg  25 mg Oral BID LOR Rendon MD   25 mg at 23 0918    sodium chloride flush 0.9 % injection 5-40 mL  5-40 mL IntraVENous 2 times per day Tony Dodson MD   10 mL at 23 0919    sodium chloride flush 0.9 % injection 5-40 mL  5-40 mL IntraVENous PRN Marian Wall MD        0.9 % sodium chloride infusion   IntraVENous PRN Marian Wall MD        ondansetron (ZOFRAN-ODT) disintegrating tablet 4 mg  4 mg Oral Q8H PRN Marian Wall MD        Or    ondansetron (ZOFRAN) injection 4 mg  4 mg IntraVENous Q6H PRN Marian Wall MD        polyethylene glycol (GLYCOLAX) packet 17 g  17 g Oral Daily PRN Marian Wall MD        acetaminophen (TYLENOL) tablet 650 mg  650 mg Oral Q6H PRN Marian Wall MD        Or    acetaminophen (TYLENOL) suppository 650 mg  650 mg Rectal Q6H PRN Kacy Lopez MD        perflutren lipid microspheres (DEFINITY) injection 1.5 mL  1.5 mL IntraVENous ONCE PRN Marian Wall MD        enoxaparin (LOVENOX) injection 105 mg  1 mg/kg (Order-Specific) SubCUTAneous BID Marian Wall MD   105 mg at 03/06/23 0918    nicotine (NICODERM CQ) 14 MG/24HR 1 patch  1 patch TransDERmal Daily Marian Wall MD   1 patch at 03/06/23 0918    furosemide (LASIX) injection 20 mg  20 mg IntraVENous Daily Lance Carbajal MD   20 mg at 03/06/23 4520    losartan (COZAAR) tablet 25 mg  25 mg Oral Daily Lance Carbajal MD   25 mg at 03/06/23 0396       Objective:     Telemetry monitor: AF    Physical Exam:  Constitutional and general appearance: alert, cooperative, no distress, and appears stated age  HEENT: PERRL, no cervical lymphadenopathy. No masses palpable. Normal oral mucosa  Respiratory:  Normal excursion and expansion without use of accessory muscles  Resp auscultation: Normal breath sounds without wheezing, rhonchi, and rales  Cardiovascular:   The apical impulse is not displaced  Heart tones are crisp and normal. irregular S1 and S2.  Jugular venous pulsation Normal  The carotid upstroke is normal in amplitude and contour without delay or bruit  Peripheral pulses are symmetrical and full   Abdomen:  No masses or tenderness  Bowel sounds present  Extremities:   No cyanosis or clubbing   No lower extremity edema   Skin: warm and dry  Neurological:  Alert and oriented  Moves all extremities well  No abnormalities of mood, affect, memory, mentation, or behavior are noted    Data    Echo 3/3/2023:   Technically difficult study due to patient is a smoker and poor acoustic   window. Difficult to assess left ventricular systolic function due to arrhythmia but   appears severely reduced ef 15-20%. Severe global HK with regional variation. Mild concentric left ventricular hypertrophy. Left ventricular diastolic filling pressure is elevated. Mild to moderate mitral regurgitation. Moderate Bi-atrial enlargement. Right ventricular systolic function is mildly reduced . Mild tricuspid regurgitation. Systolic pulmonic artery pressure (SPAP) is estimated at 41 mmHg consistent   with mild pulmonary hypertension (Right atrial pressure of 8 mmHg). All labs and testing reviewed.   Lab Review     Renal Profile:   Lab Results   Component Value Date/Time    CREATININE 1.0 03/06/2023 06:54 AM    BUN 17 03/06/2023 06:54 AM     03/06/2023 06:54 AM    K 4.3 03/06/2023 06:54 AM    CL 99 03/06/2023 06:54 AM    CO2 24 03/06/2023 06:54 AM     CBC:    Lab Results   Component Value Date/Time    WBC 8.9 03/06/2023 06:54 AM    RBC 4.74 03/06/2023 06:54 AM    HGB 14.9 03/06/2023 06:54 AM    HCT 45.0 03/06/2023 06:54 AM    MCV 95.1 03/06/2023 06:54 AM    RDW 14.7 03/06/2023 06:54 AM     03/06/2023 06:54 AM     BNP:  No results found for: BNP  Fasting Lipid Panel:    Lab Results   Component Value Date/Time    CHOL 180 03/03/2023 05:57 AM    HDL 40 03/03/2023 05:57 AM    TRIG 103 03/03/2023 05:57 AM     Cardiac Enzymes:  CK/MbTroponin  Lab Results   Component Value Date/Time    TROPONINI <0.01 03/03/2023 05:14 PM     PT/ INR   Lab Results   Component Value Date/Time    INR 1.10 03/04/2023 07:11 AM    PROTIME 14.1 03/04/2023 07:11 AM     PTT No results found for: PTT   Lab Results   Component Value Date/Time    MG 2.10 03/03/2023 05:57 AM      Lab Results   Component Value Date/Time    TSH 1.41 03/03/2023 05:57 AM       Assessment:  Atrial fibrillation of unknown duration (likely persistent): ongoing    -[id number] score 2 (HTN, CHF)   Moderately dilated left atrium   Shortness of breath: improved  Mild to moderate MR   Acute systolic CHF: ongoing    -has diuresed 3 L since admission   -weight appears inaccurate   Cardiomyopathy: unclear etiology, possibly tachycardia mediated    -EF 15-20% on echo 3/2023  HTN: suboptimal   Alcohol and tobacco use         Plan:   1. Continue PO amiodarone  2. Continue IV Lasix as renal function tolerating   3. Continue therapeutic Lovenox. Will transition to [address] when no further procedures planned. 4. Continue Toprol and losartan (will likely transition to [place])  5. Strict I&O, daily weight, fluid and sodium restriction  6. Add spironolactone  7. Smoking cessation is recommended  8. Outpatient evaluation for suspected sleep apnea   9. Plan for possible LHC tomorrow pending restoration of sinus rhythm   10.  NPO for SUSI/CV this afternoon with Dr. Javed Weldon (risks, benefits, and alternative therapy discussed)        BRANT Alexander-CNP  [facility]  [phone]

## 2023-03-06 NOTE — ANESTHESIA PRE PROCEDURE
Department of Anesthesiology  Preprocedure Note       Name:  Vimal Gardner   Age:  58 y.o.  :  1960                                          MRN:  [id number]         Date:  3/6/2023      Surgeon: * No surgeons listed *    Procedure: * No procedures listed *    Medications prior to admission:   Prior to Admission medications    Not on File       Current medications:    Current Facility-Administered Medications   Medication Dose Route Frequency Provider Last Rate Last Admin    sodium chloride flush 0.9 % injection 5-40 mL  5-40 mL IntraVENous 2 times per day Brooklyn Villanueva MD        sodium chloride flush 0.9 % injection 5-40 mL  5-40 mL IntraVENous PRN Brooklyn Villanueva MD        0.9 % sodium chloride infusion   IntraVENous PRN Brooklyn Villanueva MD        LORazepam (ATIVAN) tablet 0.5 mg  0.5 mg Oral Once PRN Brooklyn Villanueva MD        spironolactone (ALDACTONE) tablet 25 mg  25 mg Oral Daily BRANT Benoit - SHIRLEY        amiodarone (CORDARONE) tablet 400 mg  400 mg Oral TID J Leesa Dandy., MD   400 mg at 23 0697    metoprolol succinate (TOPROL XL) extended release tablet 25 mg  25 mg Oral BID J Leesa Dandy., MD   25 mg at 23 0918    sodium chloride flush 0.9 % injection 5-40 mL  5-40 mL IntraVENous 2 times per day Steve Orantes MD   10 mL at 23 0919    sodium chloride flush 0.9 % injection 5-40 mL  5-40 mL IntraVENous PRN Steve Orantes MD        0.9 % sodium chloride infusion   IntraVENous PRN Steve Orantes MD        ondansetron (ZOFRAN-ODT) disintegrating tablet 4 mg  4 mg Oral Q8H PRN Steve Orantes MD        Or    ondansetron (ZOFRAN) injection 4 mg  4 mg IntraVENous Q6H PRN Steve Orantes MD        polyethylene glycol (GLYCOLAX) packet 17 g  17 g Oral Daily PRN Steve Orantes MD        acetaminophen (TYLENOL) tablet 650 mg  650 mg Oral Q6H PRN Steve Orantes MD        Or    acetaminophen (TYLENOL) suppository 650 mg  650 mg Rectal Q6H PRN Kacy Astrid Castro MD        perflutren lipid microspheres (DEFINITY) injection 1.5 mL  1.5 mL IntraVENous ONCE PRN Asya Cramer MD        enoxaparin (LOVENOX) injection 105 mg  1 mg/kg (Order-Specific) SubCUTAneous BID Asya Cramer MD   105 mg at 03/06/23 0918    nicotine (NICODERM CQ) 14 MG/24HR 1 patch  1 patch TransDERmal Daily Asya Cramer MD   1 patch at 03/06/23 0918    furosemide (LASIX) injection 20 mg  20 mg IntraVENous Daily Ina Stephens MD   20 mg at 03/06/23 7981    losartan (COZAAR) tablet 25 mg  25 mg Oral Daily Ina Stephens MD   25 mg at 03/06/23 3561     Facility-Administered Medications Ordered in Other Encounters   Medication Dose Route Frequency Provider Last Rate Last Admin    dexmedetomidine (PRECEDEX) 200 mcg in sodium chloride 0.9 % 50 mL IV bolus   IntraVENous Continuous PRN Bailey Kilts, APRN - CRNA   20 mcg at 03/06/23 1338    propofol injection   IntraVENous PRN Bailey Kilts, APRN - CRNA   200 mg at 03/06/23 1340    lidocaine 2 % injection   IntraVENous PRN Bailey Kilts, APRN - CRNA   100 mg at 03/06/23 1339       Allergies:  No Known Allergies    Problem List:    Patient Active Problem List   Diagnosis Code    Atrial fibrillation with rapid ventricular response ([facility] [address] I48.91       Past Medical History:  History reviewed. No pertinent past medical history. Past Surgical History:  History reviewed. No pertinent surgical history.     Social History:    Social History     Tobacco Use    Smoking status: Every Day     Types: Cigarettes    Smokeless tobacco: Never   Substance Use Topics    Alcohol use: Yes     Comment: daily                                Ready to quit: Not Answered  Counseling given: Not Answered      Vital Signs (Current):   Vitals:    03/06/23 0013 03/06/23 0626 03/06/23 0745 03/06/23 1146   BP: (!) 135/111 (!) 132/105 (!) 138/110 (!) 122/95   Pulse: (!) 101 (!) 113 (!) 111 (!) 122   Resp: 18 18 18 18   Temp: 98.1 °F (36.7 °C) 97.5 °F (36.4 °C) 97.9 °F (36.6 °C) 97.9 °F (36.6 °C)   TempSrc: Oral Oral Oral Temporal   SpO2: 95% 92% 96% 98%   Weight:  210 lb (95.3 kg)     Height:                                                  BP Readings from Last 3 Encounters:   03/06/23 (!) 122/95       NPO Status:                                                                                 BMI:   Wt Readings from Last 3 Encounters:   03/06/23 210 lb (95.3 kg)     Body mass index is 26.96 kg/m².    CBC:   Lab Results   Component Value Date/Time    WBC 8.9 03/06/2023 06:54 AM    RBC 4.74 03/06/2023 06:54 AM    HGB 14.9 03/06/2023 06:54 AM    HCT 45.0 03/06/2023 06:54 AM    MCV 95.1 03/06/2023 06:54 AM    RDW 14.7 03/06/2023 06:54 AM     03/06/2023 06:54 AM       CMP:   Lab Results   Component Value Date/Time     03/06/2023 06:54 AM    K 4.3 03/06/2023 06:54 AM    CL 99 03/06/2023 06:54 AM    CO2 24 03/06/2023 06:54 AM    BUN 17 03/06/2023 06:54 AM    CREATININE 1.0 03/06/2023 06:54 AM    AGRATIO 1.4 03/03/2023 05:57 AM    LABGLOM >60 03/06/2023 06:54 AM    GLUCOSE 97 03/06/2023 06:54 AM    PROT 7.2 03/03/2023 05:57 AM    CALCIUM 9.5 03/06/2023 06:54 AM    BILITOT 0.5 03/03/2023 05:57 AM    ALKPHOS 98 03/03/2023 05:57 AM    AST 31 03/03/2023 05:57 AM    ALT 27 03/03/2023 05:57 AM       POC Tests: No results for input(s): POCGLU, POCNA, POCK, POCCL, POCBUN, POCHEMO, POCHCT in the last 72 hours.    Coags:   Lab Results   Component Value Date/Time    PROTIME 14.1 03/04/2023 07:11 AM    INR 1.10 03/04/2023 07:11 AM       HCG (If Applicable): No results found for: PREGTESTUR, PREGSERUM, HCG, HCGQUANT     ABGs: No results found for: PHART, PO2ART, FEW2TYN, BSP8FEL, BEART, I2DYGZCA     Type & Screen (If Applicable):  No results found for: LABABO, LABRH    Drug/Infectious Status (If Applicable):  No results found for: HIV, HEPCAB    COVID-19 Screening (If Applicable): No results found for: COVID19        Anesthesia Evaluation  Patient summary reviewed and Nursing notes  reviewed no history of anesthetic complications:   Airway: Mallampati: II     Neck ROM: full     Dental:          Pulmonary:   (+) current smoker                           Cardiovascular:                      Neuro/Psych:               GI/Hepatic/Renal:             Endo/Other:                     Abdominal:             Vascular: Other Findings:           Anesthesia Plan      MAC     ASA 3     (Medications & allergies reviewed  All available lab & EKG data reviewed)  Induction: intravenous. Anesthetic plan and risks discussed with patient. Plan discussed with CRNA.                     Marylee Naas, MD   3/6/2023

## 2023-03-06 NOTE — PROCEDURES
ELECTROPHYSIOLOGY REPORT    : Jayda Payne MD    COMPLICATIONS:  None    ESTIMATED LOSS OF BLOOD: None    ANESTHESIA: MAC    HISTORY:  58 y.o. with atrial fibrillation and suspected arrhythmia-mediated Cardiomyopathy presenting for SUSI-guided cardioversion. DETAILS OF PROCEDURE:      The patient was in the pre-post area  in stable condition. The patient was in a fasting, nonsedated state. He was hemodynamically stable. The risks, benefits and alternatives of the procedure were discussed with the patient and family in detail. The risks including, but not limited to, the risks of stroke, asystole, skin burns were discussed with the patient. The patient considered his options and opted to proceed with SUSI-guided external electrical cardioversion as planned. Written informed consent was obtained and placed on the chart. At this time, a timeout protocol was completed to identify the patient and the procedure being performed. The patient was attached to continuous electrocardiographic monitoring with noninvasive blood pressure monitoring as well. Anterior and posterior chest defibrillation pads were attached in the typical position. A full trans-esophageal echocardiogram study was performed. Multi-plane imaging of the cardiac structures and chambers were obtained along with Doppler data. A full report is dictated separately. In brief, there were no significant cardiac abnormalities and there was no evidence of any left atrial/left atrial appendage thrombus. Once adequate sedation was confirmed, a 200 joule synchronized biphasic external shock was delivered and successfully cardioverted the patient to normal sinus rhythm. The patient maintained adequate oxygenation throughout the procedure. He remained hemodynamically stable. Within a few minutes of the successful cardioversion, the patient started to wake up and had no evidence of any neurologic sequelae.  He continued to recover and remained hemodynamically stable with adequate vital signs and oxygen saturation.  At the end of the procedure, the patient was monitored in the Pre-Post area until he recovers back to baseline.     SUMMARY:    1. Successful SUSI-guided external electrical cardioversion.    RECOMMENDATIONS:    1.  Bed rest x 1 hour.  2.  Continue telemetry monitoring while in the hospital.

## 2023-03-06 NOTE — PROGRESS NOTES
Progress Note      PCP: No primary care provider on file. Date of Admission: 3/3/2023    Chief Complaint: Palpitations and Shortness of Breath    Hospital Course:   Patient is a 80-year-old male with past medical history of tobacco use disorder (0.5 pack/day) and alcohol usage (3-5 standard drinks daily). Patient presented to [facility], ED on 3/3/2023 with complaints of palpitations, shortness of breath, dyspnea on exertion, fatigue for the last 3 weeks. He denied chest pain, orthopnea, or bilateral LE edema. 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 Vital signs on arrival showed heart rate of 176, BP of 145/110. An EKG showed new onset atrial fibrillation with RVR with heart rate of 177. He did not note a prior history of Afib and endorsed sporadic usage of ASA 81. Troponins were negative x3 and a proBNP was 3971. Chest x-ray showed cardiomegaly, bilateral lung infiltrates, bilateral pleural effusions, likely related to pulmonary edema. CBC and CMP were otherwise unremarkable. Hemoglobin A1c of 5.4%. CHADsVasc Score of 2 noted and patient was started on therapeutic Lovenox, Lasix, diltiazem drip in the ED. Echo showed ejection fraction 15-20% with severe global hypokinesis, mild LVH, and bi-atrial enlargement. Cardiology was consulted on 3/3/2023. They recommended changing patient from diltiazem drip to amiodarone given severe cardiomyopathy and heart failure. They also continued with IV Lasix and plan to pursue SUSI/CV on 3/6/2023. The patient underwent SUSI/CV on 3/6/23 with Dr. Roni Santos. Subjective:   Patient seen at bedside this AM without acute complaints. Overnight, the patient experienced one episode of anxiety, per chart review, in which the patient received Ativan 0.5 mg with improvement. He does not endorse those symptoms this AM and states he finally slept well.  He denies CP, SOB, N/V/D, lightheadedness/dizziness, and headaches this AM. He is expected to undergo SUSI/CV this afternoon with Dr. Christa Sanders. Medications:  Reviewed    Infusion Medications    sodium chloride      sodium chloride       Scheduled Medications    sodium chloride flush  5-40 mL IntraVENous 2 times per day    spironolactone  25 mg Oral Daily    amiodarone  400 mg Oral TID    metoprolol succinate  25 mg Oral BID    sodium chloride flush  5-40 mL IntraVENous 2 times per day    enoxaparin  1 mg/kg (Order-Specific) SubCUTAneous BID    nicotine  1 patch TransDERmal Daily    furosemide  20 mg IntraVENous Daily    losartan  25 mg Oral Daily     PRN Meds: sodium chloride flush, sodium chloride, LORazepam, sodium chloride flush, sodium chloride, ondansetron **OR** ondansetron, polyethylene glycol, acetaminophen **OR** acetaminophen, perflutren lipid microspheres      Intake/Output Summary (Last 24 hours) at 3/6/2023 1350  Last data filed at 3/6/2023 1146  Gross per 24 hour   Intake 740 ml   Output 1500 ml   Net -760 ml       Physical Exam Performed:  BP (!) 122/95   Pulse (!) 122   Temp 97.9 °F (36.6 °C) (Temporal)   Resp 18   Ht 6' 2\" (1.88 m)   Wt 210 lb (95.3 kg)   SpO2 98%   BMI 26.96 kg/m²     General appearance: No apparent distress, appears stated age and cooperative. HEENT: Conjunctivae/corneas clear. Neck:  No jugular venous distention. Respiratory:  Normal respiratory effort. Clear to auscultation, bilaterally without Rales/Wheezes/Rhonchi. Cardiovascular: Irregularly irregular rhythm, however, improved since yesterday without murmurs, rubs or gallops. Abdomen: Soft, non-tender, non-distended   Musculoskeletal: No clubbing, cyanosis or edema bilaterally. Full range of motion without deformity. Skin: Skin color, texture, turgor normal.  No rashes or lesions.   Neurologic:  No focal neurological deficit  Psychiatric: Alert and oriented, thought content appropriate, normal insight      Labs:   Recent Labs     03/04/23  0711 03/05/23  0614 03/06/23  0654   WBC 7.0 7.5 8.9 HGB 13.6 14.3 14.9   HCT 41.4 43.0 45.0    260 264     Recent Labs     03/04/23  0710 03/05/23  0614 03/06/23  0654    136 135*   K 3.9 4.1 4.3    102 99   CO2 25 24 24   BUN 13 16 17   CREATININE 0.9 1.1 1.0   CALCIUM 8.9 9.2 9.5     Recent Labs     03/04/23  0711   INR 1.10     Recent Labs     03/03/23  1413 03/03/23  1714   TROPONINI <0.01 <0.01     Radiology:  XR CHEST PORTABLE   Final Result   1. Cardiomegaly with bilateral lung infiltrates and bilateral pleural   effusions, likely related to pulmonary edema. Superimposed pneumonia cannot   be excluded. Assessment/Plan:  Active Hospital Problems    Diagnosis     Atrial fibrillation with rapid ventricular response ([facility] [address] [I48.91]      Priority: Medium     New onset A-fib with RVR  - Admit EKG noted A-fib with RVR, . - Troponin x3 negative. TSH 1.41.   - CHADSVASC score 2 (CHF, HTN)  - Likely needs long-term anticoagulation at discharge  - Continue Tele monitoring for now. - Cardiology/EP following. Their recommendations appreciated:    - Continue Amiodarone 400 mg TID PO  - Continue Therapeutic Lovenox dosing with plans to transition to [address] after procedures   - Continue Toprol   - SUSI/CV on 3/6/23 with Dr. Heather Simmons. New onset Systolic CHF, HFrEF 54-00%  - Admit CXR showed cardiomegaly, bilateral lung infiltrates, bilateral pleural effusions, likely related to pulm edema.  - Admit pro-BNP 3971  - Echo on 3/3/23 showed EF of 15-20% with severe global hypokinesis. - Continue with GDMT below. - Cardiology following. Their recommendations appreciated:   -  IV Lasix 20 mg with plans to transition to PO    - Continue Toprol and Losartan (with plans to transition to [facility])   - Added Spironolactone 25 mg daily today. - Strict I&Os, daily weights, and fluid restriction.   - Low Sodium Diet.  NPO after midnight for potential procedure on 3/7/23   - Potential Stress test vs. Cath after rhythm restored     Hypertension  - Admit /110,   - Cardiology following. Their recommendation appreciated. See above. - IV Lasix 20 daily for now. Plans to switch to PO.   - Losartan 25, Toprol 25 BID  - Likely needs outpatient Pulm/Sleep Med f/u for CECILE eval per Cardiology note. Tobacco Usage  - Chronic 0.5 packs/day smoker since teenager  - Continue Nicoderm 14 mg daily. - Continued to encourage tobacco cessation efforts. Pure Hyperlipidemia   - LDL elevated at 119.   - Pt not on home statin. - Consider starting during this Inpatient stay. DVT Prophylaxis: Lovenox SC    Diet: Diet NPO Exceptions are: Sips of Water with Meds. NPO after midnight. Code Status: Full Code    PT/OT Eval Status: Not yet ordered    Dispo - Pending SUSI/CV on 3/6/23 and potential cath on 3/7/23. Garry Read DO  PGY-1, Family Medicine    Addendum to Resident  Progress note:  Pt seen,examined and evaluated with resident and discussed regarding POC . I have reviewed the current history, physical findings, labs and assessment and plan , edited the note where appropriate and agree with note as documented by resident DO ( Dr. Joe Gilliland)    Patient seen and examined this morning. Offers no new complaints. Scheduled for SUSI/CV this afternoon with Dr. Mehran Polk . Continue current care.  Will follow     Tiff Montoya MD  Hospitalist Physician

## 2023-03-06 NOTE — FLOWSHEET NOTE
Cross cover paged:  Pt here w/ Afib RVR getting a SUSI w/CV tomorrow with an extreme case of anxiety and hasn't slept much since admission on 3/3, could something be ordered for tonight? Thanks      Addendum:   0.5 Ativan q4 ordered.

## 2023-03-06 NOTE — PLAN OF CARE
Patient's EF (Ejection Fraction) is less than 40%    Heart Failure Medications:  Diuretics[de-identified] Furosemide    (One of the following REQUIRED for EF </= 40%/SYSTOLIC FAILURE but MAY be used in EF% >40%/DIASTOLIC FAILURE)        ACE[de-identified] None        ARB[de-identified] Losartan         ARNI[de-identified] None    (Beta Blockers)  NON- Evidenced Based Beta Blocker (for EF% >40%/DIASTOLIC FAILURE): None    Evidenced Based Beta Blocker::(REQUIRED for EF% <40%/SYSTOLIC FAILURE) Metoprolol SUCCinate- Toprol XL  . .................................................................................................................................................. Patient's weights and intake/output reviewed: Yes    Patient's Last Weight: 209 lbs obtained by standing scale. Difference of 2 lbs less than last documented weight. Intake/Output Summary (Last 24 hours) at 3/6/2023 0140  Last data filed at 3/5/2023 1833  Gross per 24 hour   Intake 1240 ml   Output 1995 ml   Net -755 ml       Daily Weight log at bedside and being used: [employer] Provided: yes    Comorbidities Reviewed Yes    Patient has no past medical history on file. >>For CHF and Comorbidity documentation on Education Time and Topics, please see Education Tab    Progressive Mobility Assessment:  What is this patient's Current Level of Mobility?: Ambulatory-Up Ad Astrid  How was this patient Mobilized today?: Up in Room, ambulated 10 ft                 With Whom? Self                 Level of Difficulty/Assistance: Independent     Pt resting in bed at this time on room air. Pt denies shortness of breath. Pt with nonpitting lower extremity edema.      Patient and/or Family's stated Goal of Care this Admission: reduce shortness of breath, increase activity tolerance, better understand heart failure and disease management, be more comfortable, and reduce lower extremity edema prior to discharge        :

## 2023-03-06 NOTE — ANESTHESIA POSTPROCEDURE EVALUATION
Department of Anesthesiology  Postprocedure Note    Patient: Anthony Murrell  MRN: [id number]  YOB: 1960  Date of evaluation: 3/6/2023      Procedure Summary     Date: 03/06/23 Room / Location: [facility] Cardiac Cath Lab    Anesthesia Start: 3180 Anesthesia Stop: 3207    Procedure: CARDIOVERSION WITH A SUSI Diagnosis:     Scheduled Providers:  Responsible Provider: Antonino Swann MD    Anesthesia Type: MAC ASA Status: 3          Anesthesia Type: No value filed.     [place] Phase I:      [place]on Phase II:        Anesthesia Post Evaluation    Comments: Postoperative Anesthesia Note    Name:    Anthony Murrell  MRN:      [id number]    Patient Vitals in the past 12 hrs:  03/06/23 1146, BP:(!) 122/95, Temp:97.9 °F (36.6 °C), Temp src:Temporal, Pulse:(!) 122, Resp:18, SpO2:98 %  03/06/23 0745, BP:(!) 138/110, Temp:97.9 °F (36.6 °C), Temp src:Oral, Pulse:(!) 111, Resp:18, SpO2:96 %  03/06/23 0626, BP:(!) 132/105, Temp:97.5 °F (36.4 °C), Temp src:Oral, Pulse:(!) 113, Resp:18, SpO2:92 %, Weight:210 lb (95.3 kg)     LABS:    CBC  Lab Results       Component                Value               Date/Time                  WBC                      8.9                 03/06/2023 06:54 AM        HGB                      14.9                03/06/2023 06:54 AM        HCT                      45.0                03/06/2023 06:54 AM        PLT                      264                 03/06/2023 06:54 AM   RENAL  Lab Results       Component                Value               Date/Time                  NA                       135 (L)             03/06/2023 06:54 AM        K                        4.3                 03/06/2023 06:54 AM        CL                       99                  03/06/2023 06:54 AM        CO2                      24                  03/06/2023 06:54 AM        BUN                      17                  03/06/2023 06:54 AM        CREATININE               1.0                 03/06/2023 06:54 AM        GLUCOSE 97                  03/06/2023 06:54 AM   LISA  Lab Results       Component                Value               Date/Time                  PROTIME                  14.1                03/04/2023 07:11 AM        INR                      1. 10                03/04/2023 07:11 AM     Intake & Output: In: 1480 (P.O.:1480)  Out: 2895 (Urine:2895)    Nausea & Vomiting:  No    Level of Consciousness:  Awake    Pain Assessment:  Adequate analgesia    Anesthesia Complications:  No apparent anesthetic complications    SUMMARY      Vital signs stable  OK to discharge from Stage I post anesthesia care.   Care transferred from Anesthesiology department on discharge from perioperative area

## 2023-03-06 NOTE — PLAN OF CARE
Problem: Pain  Goal: Verbalizes/displays adequate comfort level or baseline comfort level  3/6/2023 0143 by Rashawn Barone RN  Outcome: Progressing  Note: Pt reporting   0/10 pain during pain assessment. Treating pain prn. Pt reports current pain plan is working well. Instructed pt to report any new onsets of pain. Will continue to monitor. Problem: Safety - Adult  Goal: Free from fall injury  3/6/2023 0143 by Rashawn Barone RN  Outcome: Progressing  Note: Pt at risk for accidental injury. Pt will remain free from injury during admission. Pt educated on the use of the call light and the need to have an active bed alarm. Pt will call out prior to ambulation. Pt's pathway will remain free from tripping hazards. Pt's bed is in lowest position and call light in reach. Pt has no further needs at this time. Will continue to monitor.

## 2023-03-06 NOTE — PROGRESS NOTES
[address]     Electrophysiology                                     Progress Note    Admission date:  3/3/2023    Reason for follow up visit: AF, CHF    HPI/CC: Mic Castanon was admitted on 3/3/2023 with progressive shortness of breath and fatigue. EKG showed rapid AF. Echo showed an EF of 15-20%. Has also been treated for CHF. On 3/6/2023, he had a SUSI and successful cardioversion. On 3/7/2023, he had an LHC that showed severely elevated left sided filling pressures and severe two vessel disease. PCI was not pursued due to hypoxia. Rhythm has been sinus. Subjective: He is feeling okay. Denies chest pain, palpitations, shortness of breath, and dizziness. Vitals:  Blood pressure (!) 138/110, pulse (!) 111, temperature 97.9 °F (36.6 °C), temperature source Oral, resp. rate 18, height 6' 2\" (1.88 m), weight 210 lb (95.3 kg), SpO2 96 %.   Temp  Av.9 °F (36.6 °C)  Min: 97.5 °F (36.4 °C)  Max: 98.1 °F (36.7 °C)  Pulse  Av.5  Min: 66  Max: 131  BP  Min: 119/106  Max: 138/110  SpO2  Av.5 %  Min: 92 %  Max: 98 %    24 hour I/O    Intake/Output Summary (Last 24 hours) at 3/6/2023 0853  Last data filed at 3/6/2023 0142  Gross per 24 hour   Intake 1480 ml   Output 1945 ml   Net -465 ml       Current Facility-Administered Medications   Medication Dose Route Frequency Provider Last Rate Last Admin    amiodarone (CORDARONE) tablet 400 mg  400 mg Oral TID Chinedu Gandhi MD   400 mg at 23    metoprolol succinate (TOPROL XL) extended release tablet 25 mg  25 mg Oral BID LOR Gomez MD   25 mg at 23    sodium chloride flush 0.9 % injection 5-40 mL  5-40 mL IntraVENous 2 times per day Warden Grover MD   10 mL at 23    sodium chloride flush 0.9 % injection 5-40 mL  5-40 mL IntraVENous PRN Kacy Lopez MD        0.9 % sodium chloride infusion   IntraVENous PRN Warden Grover MD        ondansetron (ZOFRAN-ODT) disintegrating tablet 4 mg  4 mg Oral Q8H PRN Alban Armstrong MD        Or    ondansetron (ZOFRAN) injection 4 mg  4 mg IntraVENous Q6H PRN Alban Armstrong MD        polyethylene glycol (GLYCOLAX) packet 17 g  17 g Oral Daily PRN Alban Armstrong MD        acetaminophen (TYLENOL) tablet 650 mg  650 mg Oral Q6H PRN Alban Armstrong MD        Or    acetaminophen (TYLENOL) suppository 650 mg  650 mg Rectal Q6H PRN Alban Armstrong MD        perflutren lipid microspheres (DEFINITY) injection 1.5 mL  1.5 mL IntraVENous ONCE PRN Alban Armstrong MD        enoxaparin (LOVENOX) injection 105 mg  1 mg/kg (Order-Specific) SubCUTAneous BID Alban Armstrong MD   105 mg at 03/05/23 2040    nicotine (NICODERM CQ) 14 MG/24HR 1 patch  1 patch TransDERmal Daily Alban Armstrong MD   1 patch at 03/05/23 1022    furosemide (LASIX) injection 20 mg  20 mg IntraVENous Daily Aga Yeboah MD   20 mg at 03/05/23 1027    losartan (COZAAR) tablet 25 mg  25 mg Oral Daily Aga Yeboah MD   25 mg at 03/05/23 1022       Objective:     Telemetry monitor: SR    Physical Exam:  Constitutional and general appearance: alert, cooperative, no distress, and appears stated age  HEENT: PERRL, no cervical lymphadenopathy. No masses palpable. Normal oral mucosa  Respiratory:  Normal excursion and expansion without use of accessory muscles  Resp auscultation: basiliar crackles  Cardiovascular:   The apical impulse is not displaced  Heart tones are crisp and normal. Regular S1 and S2.  Jugular venous pulsation Normal  The carotid upstroke is normal in amplitude and contour without delay or bruit  Peripheral pulses are symmetrical and full   Abdomen:  No masses or tenderness  Bowel sounds present  Extremities:   No cyanosis or clubbing   No lower extremity edema   Skin: warm and dry  Neurological:  Alert and oriented  Moves all extremities well  No abnormalities of mood, affect, memory, mentation, or behavior are noted    Data    Echo 3/3/2023:   Technically difficult study due to patient is a smoker and poor acoustic   window. Difficult to assess left ventricular systolic function due to arrhythmia but   appears severely reduced ef 15-20%. Severe global HK with regional variation. Mild concentric left ventricular hypertrophy. Left ventricular diastolic filling pressure is elevated. Mild to moderate mitral regurgitation. Moderate Bi-atrial enlargement. Right ventricular systolic function is mildly reduced . Mild tricuspid regurgitation. Systolic pulmonic artery pressure (SPAP) is estimated at 41 mmHg consistent   with mild pulmonary hypertension (Right atrial pressure of 8 mmHg). Cath 3/7/2023:  FINAL DIAGNOSIS  Severe two-vessel CAD suggesting at least some component of ischemic cardiomyopathy  Severely elevated left-sided filling pressures suggesting volume overload        PLAN/RECOMMENDATIONS  - Diuresis for high filling pressures, 40 mg IV Lasix bolus given in the Cath Lab  - Place Velazquez or condom catheter  - Stat chest x-ray for hypoxia noted before the procedure requiring high level of supplemental oxygen with nonrebreather throughout the procedure which maintain his oxygen saturation in the 80s  - Consider pulm consult  - Once improved from oxygenation and fluid overload standpoint, high risk multivessel PCI can be planned. Patient will likely need mechanical circulatory support for the procedure       All labs and testing reviewed.   Lab Review     Renal Profile:   Lab Results   Component Value Date/Time    CREATININE 1.0 03/06/2023 06:54 AM    BUN 17 03/06/2023 06:54 AM     03/06/2023 06:54 AM    K 4.3 03/06/2023 06:54 AM    CL 99 03/06/2023 06:54 AM    CO2 24 03/06/2023 06:54 AM     CBC:    Lab Results   Component Value Date/Time    WBC 8.9 03/06/2023 06:54 AM    RBC 4.74 03/06/2023 06:54 AM    HGB 14.9 03/06/2023 06:54 AM    HCT 45.0 03/06/2023 06:54 AM    MCV 95.1 03/06/2023 06:54 AM    RDW 14.7 03/06/2023 06:54 AM     03/06/2023 06:54 AM     BNP:  No results found for: BNP  Fasting Lipid Panel:    Lab Results   Component Value Date/Time    CHOL 180 03/03/2023 05:57 AM    HDL 40 03/03/2023 05:57 AM    TRIG 103 03/03/2023 05:57 AM     Cardiac Enzymes:  CK/MbTroponin  Lab Results   Component Value Date/Time    TROPONINI <0.01 03/03/2023 05:14 PM     PT/ INR   Lab Results   Component Value Date/Time    INR 1.10 03/04/2023 07:11 AM    PROTIME 14.1 03/04/2023 07:11 AM     PTT No results found for: PTT   Lab Results   Component Value Date/Time    MG 2.10 03/03/2023 05:57 AM      Lab Results   Component Value Date/Time    TSH 1.41 03/03/2023 05:57 AM       Assessment:  Paroxysmal atrial fibrillation: stable    -[id number] score 2 (HTN, CHF)    -amiodarone started this admission    -s/p SUSI/CV 3/6/2023  Moderately dilated left atrium   Shortness of breath: recurrent    -hypoxic prior to [address] [facility] 3/7/2023  Mild to moderate MR   Acute systolic CHF: ongoing    -has diuresed 6 L since admission   -weight appears inaccurate   Cardiomyopathy: likely mixed   -EF 15-20% on echo 3/2023  Severe multivessel CAD on [address] [facility] 3/7/2023  HTN: controlled   Alcohol and tobacco use         Plan:   1. Continue PO amiodarone. Will discharge on 200 mg PO daily. 2. Continue therapeutic Lovenox. Will transition to [address] when no further procedures planned. 3. Continue Toprol   4. Further diuresis per CHF team. Appreciate input. 5. Smoking cessation is recommended  6. Outpatient evaluation for suspected sleep apnea   7. Plan for high risk PCI with Dr. Henrietta Stern once more euvolemic. Will likely need anesthesia and Impella support. 8. Will plan to re-evaluate LVEF after 90 days of optimized GDMT. If EF remains < 35%, will recommend ICD implantation. EP will follow peripherally at this time. Please contact us if needed.      Gabriella Kim, APRN-CNP  [facility]  [phone]

## 2023-03-07 ENCOUNTER — APPOINTMENT (OUTPATIENT)
Dept: CARDIAC CATH/INVASIVE PROCEDURES | Age: 63
DRG: 215 | End: 2023-03-07
Payer: COMMERCIAL

## 2023-03-07 ENCOUNTER — APPOINTMENT (OUTPATIENT)
Dept: GENERAL RADIOLOGY | Age: 63
DRG: 215 | End: 2023-03-07
Payer: COMMERCIAL

## 2023-03-07 PROBLEM — F10.90 ALCOHOL USE: Status: ACTIVE | Noted: 2023-03-07

## 2023-03-07 PROBLEM — I51.7 BIATRIAL ENLARGEMENT: Status: ACTIVE | Noted: 2023-03-07

## 2023-03-07 PROBLEM — Z78.9 ALCOHOL USE: Status: ACTIVE | Noted: 2023-03-07

## 2023-03-07 PROBLEM — I27.20 PULMONARY HYPERTENSION (HCC): Status: ACTIVE | Noted: 2023-03-07

## 2023-03-07 PROBLEM — I34.0 MITRAL REGURGITATION: Status: ACTIVE | Noted: 2023-03-07

## 2023-03-07 PROBLEM — J96.01 ACUTE RESPIRATORY FAILURE WITH HYPOXIA (HCC): Status: ACTIVE | Noted: 2023-03-07

## 2023-03-07 PROBLEM — E66.3 OVERWEIGHT (BMI 25.0-29.9): Status: ACTIVE | Noted: 2023-03-07

## 2023-03-07 PROBLEM — F17.200 CURRENT SMOKER: Status: ACTIVE | Noted: 2023-03-07

## 2023-03-07 PROBLEM — J81.0 ACUTE PULMONARY EDEMA (HCC): Status: ACTIVE | Noted: 2023-03-07

## 2023-03-07 LAB
ANION GAP SERPL CALCULATED.3IONS-SCNC: 13 MMOL/L (ref 3–16)
BASE EXCESS ARTERIAL: -3 (ref -3–3)
BUN BLDV-MCNC: 16 MG/DL (ref 7–20)
CALCIUM IONIZED: 1.15 MMOL/L (ref 1.12–1.32)
CALCIUM SERPL-MCNC: 8.9 MG/DL (ref 8.3–10.6)
CHLORIDE BLD-SCNC: 101 MMOL/L (ref 99–110)
CO2: 21 MMOL/L (ref 21–32)
CREAT SERPL-MCNC: 1 MG/DL (ref 0.8–1.3)
GFR SERPL CREATININE-BSD FRML MDRD: >60 ML/MIN/{1.73_M2}
GFR SERPL CREATININE-BSD FRML MDRD: >60 ML/MIN/{1.73_M2}
GLUCOSE BLD-MCNC: 87 MG/DL (ref 70–99)
GLUCOSE BLD-MCNC: 98 MG/DL (ref 70–99)
HCO3 ARTERIAL: 21.1 MMOL/L (ref 21–29)
HCT VFR BLD CALC: 42.5 % (ref 40.5–52.5)
HEMOGLOBIN: 14.1 G/DL (ref 13.5–17.5)
HEMOGLOBIN: 14.9 GM/DL (ref 13.5–17.5)
MCH RBC QN AUTO: 31.2 PG (ref 26–34)
MCHC RBC AUTO-ENTMCNC: 33 G/DL (ref 31–36)
MCV RBC AUTO: 94.4 FL (ref 80–100)
O2 SAT, ARTERIAL: 100 % (ref 93–100)
PCO2 ARTERIAL: 30.6 MM HG (ref 35–45)
PDW BLD-RTO: 14.7 % (ref 12.4–15.4)
PERFORMED ON: ABNORMAL
PH ARTERIAL: 7.45 (ref 7.35–7.45)
PLATELET # BLD: 220 K/UL (ref 135–450)
PMV BLD AUTO: 8.2 FL (ref 5–10.5)
PO2 ARTERIAL: 213.3 MM HG (ref 75–108)
POC CHLORIDE: 108 MMOL/L (ref 99–110)
POC CREATININE: 1.1 MG/DL (ref 0.8–1.3)
POC HEMATOCRIT: 44 % (ref 40.5–52.5)
POC POTASSIUM: 3.7 MMOL/L (ref 3.5–5.1)
POC SAMPLE TYPE: ABNORMAL
POC SODIUM: 140 MMOL/L (ref 136–145)
POTASSIUM REFLEX MAGNESIUM: 3.8 MMOL/L (ref 3.5–5.1)
PRO-BNP: 3819 PG/ML (ref 0–124)
RBC # BLD: 4.5 M/UL (ref 4.2–5.9)
SODIUM BLD-SCNC: 135 MMOL/L (ref 136–145)
TCO2 ARTERIAL: 22 MMOL/L
WBC # BLD: 11 K/UL (ref 4–11)

## 2023-03-07 PROCEDURE — 83880 ASSAY OF NATRIURETIC PEPTIDE: CPT

## 2023-03-07 PROCEDURE — 99232 SBSQ HOSP IP/OBS MODERATE 35: CPT | Performed by: NURSE PRACTITIONER

## 2023-03-07 PROCEDURE — 99233 SBSQ HOSP IP/OBS HIGH 50: CPT | Performed by: NURSE PRACTITIONER

## 2023-03-07 PROCEDURE — 6360000002 HC RX W HCPCS: Performed by: NURSE PRACTITIONER

## 2023-03-07 PROCEDURE — 93458 L HRT ARTERY/VENTRICLE ANGIO: CPT

## 2023-03-07 PROCEDURE — 2580000003 HC RX 258: Performed by: INTERNAL MEDICINE

## 2023-03-07 PROCEDURE — 82803 BLOOD GASES ANY COMBINATION: CPT

## 2023-03-07 PROCEDURE — 82947 ASSAY GLUCOSE BLOOD QUANT: CPT

## 2023-03-07 PROCEDURE — 82330 ASSAY OF CALCIUM: CPT

## 2023-03-07 PROCEDURE — 71045 X-RAY EXAM CHEST 1 VIEW: CPT

## 2023-03-07 PROCEDURE — 99222 1ST HOSP IP/OBS MODERATE 55: CPT | Performed by: INTERNAL MEDICINE

## 2023-03-07 PROCEDURE — 85014 HEMATOCRIT: CPT

## 2023-03-07 PROCEDURE — 6360000002 HC RX W HCPCS: Performed by: INTERNAL MEDICINE

## 2023-03-07 PROCEDURE — 6370000000 HC RX 637 (ALT 250 FOR IP): Performed by: INTERNAL MEDICINE

## 2023-03-07 PROCEDURE — 6370000000 HC RX 637 (ALT 250 FOR IP): Performed by: NURSE PRACTITIONER

## 2023-03-07 PROCEDURE — 2500000003 HC RX 250 WO HCPCS

## 2023-03-07 PROCEDURE — 36415 COLL VENOUS BLD VENIPUNCTURE: CPT

## 2023-03-07 PROCEDURE — 6360000004 HC RX CONTRAST MEDICATION

## 2023-03-07 PROCEDURE — 2060000000 HC ICU INTERMEDIATE R&B

## 2023-03-07 PROCEDURE — 2709999900 HC NON-CHARGEABLE SUPPLY

## 2023-03-07 PROCEDURE — 84132 ASSAY OF SERUM POTASSIUM: CPT

## 2023-03-07 PROCEDURE — 82565 ASSAY OF CREATININE: CPT

## 2023-03-07 PROCEDURE — 80048 BASIC METABOLIC PNL TOTAL CA: CPT

## 2023-03-07 PROCEDURE — 85027 COMPLETE CBC AUTOMATED: CPT

## 2023-03-07 PROCEDURE — 6360000002 HC RX W HCPCS

## 2023-03-07 PROCEDURE — 82435 ASSAY OF BLOOD CHLORIDE: CPT

## 2023-03-07 PROCEDURE — 84295 ASSAY OF SERUM SODIUM: CPT

## 2023-03-07 PROCEDURE — C1894 INTRO/SHEATH, NON-LASER: HCPCS

## 2023-03-07 PROCEDURE — C1769 GUIDE WIRE: HCPCS

## 2023-03-07 RX ORDER — ONDANSETRON 2 MG/ML
4 INJECTION INTRAMUSCULAR; INTRAVENOUS EVERY 6 HOURS PRN
Status: DISCONTINUED | OUTPATIENT
Start: 2023-03-07 | End: 2023-03-07

## 2023-03-07 RX ORDER — ASPIRIN 325 MG
325 TABLET ORAL ONCE
Status: COMPLETED | OUTPATIENT
Start: 2023-03-07 | End: 2023-03-07

## 2023-03-07 RX ORDER — LORAZEPAM 0.5 MG/1
0.5 TABLET ORAL
Status: ACTIVE | OUTPATIENT
Start: 2023-03-07 | End: 2023-03-08

## 2023-03-07 RX ORDER — MIDAZOLAM HYDROCHLORIDE 1 MG/ML
INJECTION INTRAMUSCULAR; INTRAVENOUS
Status: COMPLETED | OUTPATIENT
Start: 2023-03-07 | End: 2023-03-07

## 2023-03-07 RX ORDER — SODIUM CHLORIDE 0.9 % (FLUSH) 0.9 %
5-40 SYRINGE (ML) INJECTION PRN
Status: DISCONTINUED | OUTPATIENT
Start: 2023-03-07 | End: 2023-03-09 | Stop reason: SDUPTHER

## 2023-03-07 RX ORDER — PROCHLORPERAZINE MALEATE 5 MG/1
5 TABLET ORAL EVERY 6 HOURS PRN
Status: DISCONTINUED | OUTPATIENT
Start: 2023-03-07 | End: 2023-03-14 | Stop reason: HOSPADM

## 2023-03-07 RX ORDER — FUROSEMIDE 10 MG/ML
40 INJECTION INTRAMUSCULAR; INTRAVENOUS 3 TIMES DAILY
Status: DISCONTINUED | OUTPATIENT
Start: 2023-03-07 | End: 2023-03-14 | Stop reason: HOSPADM

## 2023-03-07 RX ORDER — HEPARIN SODIUM 1000 [USP'U]/ML
INJECTION, SOLUTION INTRAVENOUS; SUBCUTANEOUS
Status: COMPLETED | OUTPATIENT
Start: 2023-03-07 | End: 2023-03-07

## 2023-03-07 RX ORDER — SODIUM CHLORIDE 9 MG/ML
INJECTION, SOLUTION INTRAVENOUS PRN
Status: DISCONTINUED | OUTPATIENT
Start: 2023-03-07 | End: 2023-03-10

## 2023-03-07 RX ORDER — FUROSEMIDE 10 MG/ML
INJECTION INTRAMUSCULAR; INTRAVENOUS
Status: COMPLETED | OUTPATIENT
Start: 2023-03-07 | End: 2023-03-07

## 2023-03-07 RX ORDER — SODIUM CHLORIDE 0.9 % (FLUSH) 0.9 %
5-40 SYRINGE (ML) INJECTION EVERY 12 HOURS SCHEDULED
Status: DISCONTINUED | OUTPATIENT
Start: 2023-03-07 | End: 2023-03-09 | Stop reason: SDUPTHER

## 2023-03-07 RX ORDER — FUROSEMIDE 10 MG/ML
40 INJECTION INTRAMUSCULAR; INTRAVENOUS ONCE
Status: COMPLETED | OUTPATIENT
Start: 2023-03-07 | End: 2023-03-07

## 2023-03-07 RX ADMIN — AMIODARONE HYDROCHLORIDE 400 MG: 200 TABLET ORAL at 09:26

## 2023-03-07 RX ADMIN — METOPROLOL SUCCINATE 25 MG: 25 TABLET, EXTENDED RELEASE ORAL at 09:26

## 2023-03-07 RX ADMIN — FUROSEMIDE 40 MG: 10 INJECTION, SOLUTION INTRAMUSCULAR; INTRAVENOUS at 12:42

## 2023-03-07 RX ADMIN — SPIRONOLACTONE 25 MG: 25 TABLET, FILM COATED ORAL at 09:26

## 2023-03-07 RX ADMIN — FUROSEMIDE 40 MG: 10 INJECTION, SOLUTION INTRAMUSCULAR; INTRAVENOUS at 20:47

## 2023-03-07 RX ADMIN — ENOXAPARIN SODIUM 105 MG: 150 INJECTION SUBCUTANEOUS at 20:45

## 2023-03-07 RX ADMIN — HEPARIN SODIUM 4000 UNITS: 1000 INJECTION, SOLUTION INTRAVENOUS; SUBCUTANEOUS at 11:42

## 2023-03-07 RX ADMIN — METOPROLOL SUCCINATE 25 MG: 25 TABLET, EXTENDED RELEASE ORAL at 20:44

## 2023-03-07 RX ADMIN — MIDAZOLAM HYDROCHLORIDE 0.5 MG: 1 INJECTION INTRAMUSCULAR; INTRAVENOUS at 11:43

## 2023-03-07 RX ADMIN — LOSARTAN POTASSIUM 25 MG: 25 TABLET, FILM COATED ORAL at 09:26

## 2023-03-07 RX ADMIN — FUROSEMIDE 40 MG: 10 INJECTION INTRAMUSCULAR; INTRAVENOUS at 11:48

## 2023-03-07 RX ADMIN — ASPIRIN 325 MG: 325 TABLET ORAL at 09:26

## 2023-03-07 RX ADMIN — AMIODARONE HYDROCHLORIDE 400 MG: 200 TABLET ORAL at 20:44

## 2023-03-07 RX ADMIN — Medication 10 ML: at 09:27

## 2023-03-07 RX ADMIN — Medication 10 ML: at 20:44

## 2023-03-07 NOTE — PLAN OF CARE
Patient's EF (Ejection Fraction) is less than 40%    Heart Failure Medications:  Diuretics[de-identified] Furosemide and Spironolactone    (One of the following REQUIRED for EF </= 40%/SYSTOLIC FAILURE but MAY be used in EF% >40%/DIASTOLIC FAILURE)        ACE[de-identified] None        ARB[de-identified] Losartan         ARNI[de-identified] None    (Beta Blockers)  NON- Evidenced Based Beta Blocker (for EF% >40%/DIASTOLIC FAILURE): None    Evidenced Based Beta Blocker::(REQUIRED for EF% <40%/SYSTOLIC FAILURE) Metoprolol SUCCinate- Toprol XL  . .................................................................................................................................................. Patient's weights and intake/output reviewed: Yes    Patient's Last Weight: 210 lbs obtained by standing scale. Difference of 1 lbs more than last documented weight. Intake/Output Summary (Last 24 hours) at 3/6/2023 2338  Last data filed at 3/6/2023 2022  Gross per 24 hour   Intake 480 ml   Output 1950 ml   Net -1470 ml       Daily Weight log at bedside and being used: [employer] Provided: yes    Comorbidities Reviewed Yes    Patient has no past medical history on file. >>For CHF and Comorbidity documentation on Education Time and Topics, please see Education Tab    Progressive Mobility Assessment:  What is this patient's Current Level of Mobility?: Ambulatory-Up Ad Astrid  How was this patient Mobilized today?: Edge of Bed, Up to Chair, Bedside Commode,  Up to Toilet/Shower, and Up in Room, ambulated 40 ft                 With Whom? Self                 Level of Difficulty/Assistance: Independent     Pt resting in bed at this time on room air. Pt denies shortness of breath. Pt without lower extremity edema.      Patient and/or Family's stated Goal of Care this Admission: reduce shortness of breath, increase activity tolerance, better understand heart failure and disease management, and be more comfortable prior to discharge        :

## 2023-03-07 NOTE — PROGRESS NOTES
[address]   CHF Progress Note    Admit Date:  3/3/2023  HPI:    Chief Complaint   Patient presents with    Shortness of Breath     Started a couple weeks ago, worst the last couple days. Interval history: Peter Zamora is being followed for acute CHF. Admitted with worse shortness of breath, EF 15-20%. Subjective:  Mr. August Jaquez seen post cardiac cath. Denies much shortness of breath. Responding to the 80mg IV lasix already given today. No oxygen requirements. Family at the bedside.     Objective:   /77   Pulse 70   Temp 98.4 °F (36.9 °C) (Oral)   Resp 20   Ht 6' 2\" (1.88 m)   Wt 207 lb 9.6 oz (94.2 kg)   SpO2 96%   BMI 26.65 kg/m²     Intake/Output Summary (Last 24 hours) at 3/7/2023 1314  Last data filed at 3/7/2023 0818  Gross per 24 hour   Intake 480 ml   Output 1200 ml   Net -720 ml       NYHA: III    Physical Exam:  General:  Awake, alert, NAD  Skin:  Warm and dry  Neck:  JVD ~10-12 with + HJR   Chest:  Clear to auscultation, no wheezes/rhonchi/rales  Telemetry: afib   Cardiovascular:  irregular  S1S2, no m/r/g   Abdomen:  Soft, nontender, +bowel sounds  Extremities:  no  bilateral lower extremity edema, cool hands and feet     Medications:    sodium chloride flush  5-40 mL IntraVENous 2 times per day    sodium chloride flush  5-40 mL IntraVENous 2 times per day    spironolactone  25 mg Oral Daily    amiodarone  400 mg Oral BID    metoprolol succinate  25 mg Oral BID    sodium chloride flush  5-40 mL IntraVENous 2 times per day    enoxaparin  1 mg/kg (Order-Specific) SubCUTAneous BID    nicotine  1 patch TransDERmal Daily    furosemide  20 mg IntraVENous Daily    losartan  25 mg Oral Daily      sodium chloride      sodium chloride      sodium chloride         Lab Data:  CBC:   Recent Labs     03/05/23  0614 03/06/23  0654 03/07/23  0732 03/07/23  1149   WBC 7.5 8.9 11.0  --    HGB 14.3 14.9 14.1 14.9    264 220  --      BMP:    Recent Labs     03/05/23  1397 03/06/23  0654 03/07/23  0732 03/07/23  1149    135* 135*  --    K 4.1 4.3 3.8  --    CO2 24 24 21  --    BUN 16 17 16  --    CREATININE 1.1 1.0 1.0 1.1     INR:  No results for input(s): INR in the last 72 hours. BNP:  No results for input(s): PROBNP in the last 72 hours. Lab Results   Component Value Date    LVEF 18 03/03/2023       Testing:    Echo 3/3/2023:   Technically difficult study due to patient is a smoker and poor acoustic   window. Difficult to assess left ventricular systolic function due to arrhythmia but appears severely reduced ef 15-20%. Severe global HK with regional variation. Mild concentric left ventricular hypertrophy. Left ventricular diastolic filling pressure is elevated. Mild to moderate mitral regurgitation. Moderate Bi-atrial enlargement. Right ventricular systolic function is mildly reduced . Mild tricuspid regurgitation. Systolic pulmonic artery pressure (SPAP) is estimated at 41 mmHg consistent with mild pulmonary hypertension (Right atrial pressure of 8 mmHg). [facility] 3/7/2023  FINDINGS  Left main - mild diffuse disease  LAD - 80% diffuse stenosis in the midsegment.   Small diagonal branch is subtotally occluded  Left circumflex - large OM1 branch is subtotally occluded with CRISTÓBAL I flow  RCA - large dominant vessel, mild luminal irregularities     LVEDP: 25  FINAL DIAGNOSIS  Severe two-vessel CAD suggesting at least some component of ischemic cardiomyopathy  Severely elevated left-sided filling pressures suggesting volume overload       03/07/23 0553 207 lb 9.6 oz (94.2 kg) Standing scale     03/06/23 0626 210 lb (95.3 kg) Actual;Standing scale     03/05/23 0455 209 lb 9.6 oz (95.1 kg) Actual;Standing scale     03/04/23 0458 211 lb (95.7 kg) Standing scale     03/03/23 1530 235 lb (106.6 kg) --     03/03/23 0908 235 lb (106.6 kg) --           Principal Problem:    Atrial fibrillation with rapid ventricular response (HCC)  Active Problems:    Acute systolic heart failure (HCC)    Cardiomyopathy ([facility] [address]    Uncontrolled hypertension    Acute respiratory failure with hypoxia (HCC)    Acute pulmonary edema (HCC)    Mitral regurgitation    Biatrial enlargement    Pulmonary hypertension (HCC)    Overweight (BMI 25.0-29. 9)    Current smoker    Alcohol use  Resolved Problems:    * No resolved hospital problems. *      Assessment:  Acute HFrEF  Biventricular heart failure  Cardiomyopathy LVEF 15-20%; likely mixed ishcemic and nonischemic   CAD - s/p [facility] 3/7/2023 with severe 2 vessel CAD   Afib- EP following  HTN  Hx of alcohol abuse      Plan:  Daily weights  Daily BMP  Adjust IV lasix to 40mg TID   CXR reviewed, shows cardiomegaly, no infiltrates or pleural effusions  D/C losartan to allow for more diuresis at this time and then will plan to start entresto prior to discharge   Continue Spironolactone (aldactone)   Continue toprol 25mg BID    Lovenox until procedure completed   Amiodarone per EP     Plan for high risk stage PCI in the coming week once volume status is optimized     Recommend outpatient [facility] evaluation     Core measures for HFrEF (EF <40%):  EF: 15-20%   ICD: NA needs GDMT titration 1st   ACEi/ARB/ARNI: entresto at discharge  BB:toprol   Elie: 25 mg Spironolactone (aldactone)  SGLT2: to be added     Education provided today Disease process and medications discussed. Questions answered fully.   Total Time spent educating today 10 minutes     BRANT Bustos - CNP,  3/7/2023, 2:51 PM

## 2023-03-07 NOTE — CONSULTS
INPATIENT PULMONARY CRITICAL CARE CONSULT NOTE      Chief Complaint/Referring Provider:  Patient is being seen at the request of Dr. Lake Couch for a consultation for new onset hypoxemia, patient in Cath Lab with saturations in the 50s, improved to 80s with nonrebreather facemask     Presenting HPI: Patient was brought to the hospital because of palpitations and shortness of breath    As per the admitting provider-62 y.o. male who presented to [facility] with 2 to 3-week duration of persistent shortness of breath with exertion in addition to chronic fatigue. No fever or chills. No sick contacts. No chest pain. No orthopnea. Has not noticed any swelling. No past cardiac history. Not seen by a physician for a long time because he did not need it. Noticed about 2 to 3 weeks ago that patient was getting short of breath especially with exertion. Recently only a few steps make the patient more short of breath. Noticed palpitations and checked his pulse was very fast so decided to come to the emergency department. No other accompanying symptoms     Nothing else that makes the patient feel better or worse. In the emergency department, patient was diagnosed with atrial fibrillation with rapid ventricular response. At the time of arrival, heart rate was around 170. Confirmed that never had anything like this before. Hospitalist service was called for admission.     Patient was taken to the Cath Lab today after evaluating the patient for ischemic cardiomyopathy and patient developed profound shortness of breath and hypoxemia in the Cath Lab with saturation dropping to as low as 50% and had to be given nonrebreather facemask, patient was given Lasix in the Cath Lab which was followed by more on the floor, patient states that he feels better, patient was not having any significant shortness of breath when seen, patient states that he did have shortness of breath on arrival to the hospital, patient states that he has not experienced anything similar prior to that, patient states that he does not have any significant chest pain at this time, patient does not have any increasing cough or expectoration or wheezing or chest pain on deep inspiration, patient did not have any epistaxis or hemoptysis, no odynophagia or dysphagia per se, patient did not have any significant abdominal symptoms of concern, no increasing leg edema, patient does snore at nighttime, patient states that he drinks couple of coffees in the daytime and keeps on working, patient does not give any objective history of sleep fragmentation per se, no history of any occupational exposures, patient does have history of smoking, no change in the ambient pulmonary sick contacts, no other pertinent review of system of concern     Patient Active Problem List    Diagnosis Date Noted    Acute respiratory failure with hypoxia ([facility] 03/07/2023    Acute pulmonary edema ([facility] 03/07/2023    Mitral regurgitation 03/07/2023    Biatrial enlargement 03/07/2023    Pulmonary hypertension ([facility] 03/07/2023    Overweight (BMI 25.0-29.9) 03/07/2023    Current smoker 03/07/2023    Alcohol use 13/07/4909    Acute systolic heart failure ([address] 03/06/2023    Cardiomyopathy ([address] 03/06/2023    Uncontrolled hypertension 03/06/2023    [facility] fibrillation with [facility] ventricular response [facility] 03/03/2023       History reviewed. No pertinent past medical history. History reviewed. No pertinent surgical history. History reviewed. No pertinent family history. Social History     Tobacco Use    Smoking status: Every Day     Types: Cigarettes    Smokeless tobacco: Never   Substance Use Topics    Alcohol use: Yes     Comment: daily        No Known Allergies            Physical Exam:  Blood pressure (!) 126/93, pulse 73, temperature 98.4 °F (36.9 °C), temperature source Oral, resp. rate 20, height 6' 2\" (1.88 m), weight 207 lb 9.6 oz (94.2 kg), SpO2 98 %.'   Constitutional:  No acute distress. HENT:  Oropharynx is clear and moist. No thyromegaly. Eyes:  Conjunctivae are normal. Pupils equal, round, and reactive to light. No scleral icterus. Neck: . No tracheal deviation present. No obvious thyroid mass. Cardiovascular: Normal rate, regular rhythm, normal heart sounds. No right ventricular heave. No lower extremity edema. Pulmonary/Chest: No wheezes. Bibasilar rales. Chest wall is not dull to percussion. No accessory muscle usage or stridor. Abdominal: Soft. Bowel sounds present. No distension or hernia. No tenderness. Musculoskeletal: No cyanosis. No clubbing. No obvious joint deformity. Lymphadenopathy: No cervical or supraclavicular adenopathy. Skin: Skin is warm and dry. No rash or nodules on the exposed extremities. Psychiatric: Normal mood and affect. Behavior is normal.  No anxiety. Neurologic: Alert, awake and oriented. PERRL. Speech fluent        Results:  CBC:   Recent Labs     03/05/23  0614 03/06/23  0654 03/07/23  0732   WBC 7.5 8.9 11.0   HGB 14.3 14.9 14.1   HCT 43.0 45.0 42.5   MCV 95.2 95.1 94.4    264 220     BMP:   Recent Labs     03/05/23  0614 03/06/23  0654 03/07/23  0732    135* 135*   K 4.1 4.3 3.8    99 101   CO2 24 24 21   BUN 16 17 16   CREATININE 1.1 1.0 1.0         Imaging:  I have reviewed radiology images personally. XR CHEST PORTABLE   Final Result   1. Cardiomegaly with bilateral lung infiltrates and bilateral pleural   effusions, likely related to pulmonary edema. Superimposed pneumonia cannot   be excluded. XR CHEST PORTABLE    (Results Pending)     XR CHEST PORTABLE    Result Date: 3/3/2023  EXAMINATION: ONE XRAY VIEW OF THE CHEST 3/3/2023 6:13 am COMPARISON: None. HISTORY: ORDERING SYSTEM PROVIDED HISTORY: sob TECHNOLOGIST PROVIDED HISTORY: Reason for exam:->sob Reason for Exam: sob FINDINGS: The a cardiac silhouette is enlarged.   Mediastinal contours are normal. There are bilateral lung infiltrates with bilateral pleural effusions. No pneumothorax. The visualized osseous structures are unremarkable. 1. Cardiomegaly with bilateral lung infiltrates and bilateral pleural effusions, likely related to pulmonary edema. Superimposed pneumonia cannot be excluded. Echocardiogram:Summary   Technically difficult study due to patient is a smoker and poor acoustic   window. Difficult to assess left ventricular systolic function due to arrhythmia but   appears severely reduced ef 15-20%. Severe global HK with regional variation. Mild concentric left ventricular hypertrophy. Left ventricular diastolic filling pressure is elevated. Mild to moderate mitral regurgitation. Moderate Bi-atrial enlargement. Right ventricular systolic function is mildly reduced . Mild tricuspid regurgitation. Systolic pulmonic artery pressure (SPAP) is estimated at 41 mmHg consistent   with mild pulmonary hypertension (Right atrial pressure of 8 mmHg). PFT:None in Epic        Assessment:  Principal Problem:    Atrial fibrillation with rapid ventricular response (HCC)  Active Problems:    Acute systolic heart failure (HCC)    Cardiomyopathy ([address]    Uncontrolled hypertension    Acute respiratory failure with hypoxia (HCC)    Acute pulmonary edema (HCC)    Mitral regurgitation    Biatrial enlargement    Pulmonary hypertension (HCC)    Overweight (BMI 25.0-29. 9)    Current smoker    Alcohol use  Resolved Problems:    * No resolved hospital problems.  *          Plan:   Oxygen supplementation, if required, to see keep saturation between 90 and 94% only  Patient was given Lasix twice with improvement in patient's symptomatology and hypoxemia  Patient does not appear to have any significant pneumonic process with needs any antibiotics  Patient does not have any acute bronchospasm on evaluation and does not need any steroids or any bronchodilators on regular basis  Patient does have a history of smoking  Patient also has a significant ischemic cardiomyopathy and patient will be needing high risk PCI at some point later once the patient stabilizes as being stated by cardiology  Patient also has been overweight along with that patient has biatrial enlargement and there is a possibility that patient has sleep apnea and may benefit from HST as an outpatient  Keep negative fluid balance  Monitor input output and BMP  Correct electrolytes on whenever necessary basis  Cardiac medications as per cardiology  Further management and evaluation of mitral regurgitation as per cardiology  Smoking cessation advised  Nicotine replacement therapy if the patient wants  Patient is on therapeutic doses of Lovenox as per cardiology-internal medicine/cardiology decide upon continuation at the same dose  PUD prophylaxis      Case discussed with patient and nursing            Electronically signed by:  Gato Vickers MD    3/7/2023    12:27 PM.

## 2023-03-07 NOTE — PROGRESS NOTES
Progress Note      PCP: No primary care provider on file. Date of Admission: 3/3/2023    Chief Complaint: Palpitations and Shortness of Breath    Hospital Course:   Patient is a 51-year-old male with past medical history of tobacco use disorder (0.5 pack/day) and alcohol usage (3-5 standard drinks daily). Patient presented to [facility], ED on 3/3/2023 with complaints of palpitations, shortness of breath, dyspnea on exertion, fatigue for the last 3 weeks. He denied chest pain, orthopnea, or bilateral LE edema. 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 Vital signs on arrival showed heart rate of 176, BP of 145/110. An EKG showed new onset atrial fibrillation with RVR with heart rate of 177. He did not note a prior history of Afib and endorsed sporadic usage of ASA 81. Troponins were negative x3 and a proBNP was 3971. Chest x-ray showed cardiomegaly, bilateral lung infiltrates, bilateral pleural effusions, likely related to pulmonary edema. CBC and CMP were otherwise unremarkable. Hemoglobin A1c of 5.4%. CHADsVasc Score of 2 noted and patient was started on therapeutic Lovenox, Lasix, diltiazem drip in the ED. Echo showed ejection fraction 15-20% with severe global hypokinesis, mild LVH, and bi-atrial enlargement. Cardiology was consulted on 3/3/2023. They recommended changing patient from diltiazem drip to amiodarone given severe cardiomyopathy and heart failure. They also continued with IV Lasix and plan to pursue SUSI/CV on 3/6/2023. The patient underwent successful SUSI/CV on 3/6/23 with Dr. Toan Diaz. On 3/7/23, pt underwent cath with Dr. Karol Schulz. Results pending. Subjective:   Pt seen at bedside this AM without acute complaints. No acute events reported overnight. He underwent successful SUSI/CV yesterday. He reports that he feels improved today. Denies CP, SOB, N/V/D, lightheadedness/dizziness, headaches, palpitations, or vision changes.  He is expected to undergo a cath procedure this AM and is asking about post-cath treatment plan. Medications:  Reviewed    Infusion Medications    sodium chloride      sodium chloride      sodium chloride       Scheduled Medications    sodium chloride flush  5-40 mL IntraVENous 2 times per day    sodium chloride flush  5-40 mL IntraVENous 2 times per day    spironolactone  25 mg Oral Daily    amiodarone  400 mg Oral BID    metoprolol succinate  25 mg Oral BID    sodium chloride flush  5-40 mL IntraVENous 2 times per day    enoxaparin  1 mg/kg (Order-Specific) SubCUTAneous BID    nicotine  1 patch TransDERmal Daily    furosemide  20 mg IntraVENous Daily    losartan  25 mg Oral Daily     PRN Meds: sodium chloride flush, sodium chloride, LORazepam, prochlorperazine, sodium chloride flush, sodium chloride, sodium chloride flush, sodium chloride, polyethylene glycol, acetaminophen **OR** acetaminophen, perflutren lipid microspheres      Intake/Output Summary (Last 24 hours) at 3/7/2023 1142  Last data filed at 3/7/2023 0818  Gross per 24 hour   Intake 480 ml   Output 1900 ml   Net -1420 ml       Physical Exam Performed:  BP (!) 134/91   Pulse 78   Temp 98.4 °F (36.9 °C) (Oral)   Resp 20   Ht 6' 2\" (1.88 m)   Wt 207 lb 9.6 oz (94.2 kg)   SpO2 95%   BMI 26.65 kg/m²     General appearance: No apparent distress, appears stated age and cooperative. HEENT: Conjunctivae/corneas clear. Neck:  No jugular venous distention. Respiratory:  Normal respiratory effort. Clear to auscultation, bilaterally without Rales/Wheezes/Rhonchi. Cardiovascular: RRR without murmurs, rubs or gallops. Abdomen: Soft, non-tender, non-distended   Musculoskeletal: No clubbing, cyanosis or edema bilaterally. Full range of motion without deformity. Skin: Skin color, texture, turgor normal.  No rashes or lesions.   Neurologic:  No focal neurological deficit  Psychiatric: Alert and oriented, thought content appropriate, normal insight      Labs:   Recent Labs     03/05/23  0614 03/06/23  0654 03/07/23  0732   WBC 7.5 8.9 11.0   HGB 14.3 14.9 14.1   HCT 43.0 45.0 42.5    264 220     Recent Labs     03/05/23  0614 03/06/23  0654 03/07/23  0732    135* 135*   K 4.1 4.3 3.8    99 101   CO2 24 24 21   BUN 16 17 16   CREATININE 1.1 1.0 1.0   CALCIUM 9.2 9.5 8.9       Radiology:  XR CHEST PORTABLE   Final Result   1. Cardiomegaly with bilateral lung infiltrates and bilateral pleural   effusions, likely related to pulmonary edema. Superimposed pneumonia cannot   be excluded. Assessment/Plan:  Active Hospital Problems    Diagnosis     Acute systolic heart failure ([address] [I50.21]      Priority: Medium    Cardiomyopathy ([facility] [I42.9]      Priority: Medium    Uncontrolled hypertension [I10]      Priority: Medium    Atrial fibrillation with rapid ventricular response (HCC) [I48.91]      Priority: Medium     New onset A-fib with RVR  - Admit EKG noted A-fib with RVR, . - Troponin x3 negative. TSH 1.41.   - CHADSVASC score 2 (CHF, HTN)  - Likely needs long-term anticoagulation at discharge  - Continue Tele monitoring for now. - Cardiology/EP following. Their recommendations appreciated:    - Continue Amiodarone 400 mg TID PO for now. - Continue Therapeutic Lovenox dosing with plans to transition to [address] after procedures   - Continue Toprol   -  Successful SUSI/CV on 3/6/23 with Dr. Manpreet Strickland. New onset Systolic CHF, HFrEF 40-41%  - Admit CXR showed cardiomegaly, bilateral lung infiltrates, bilateral pleural effusions, likely related to pulm edema.  - Admit pro-BNP 3971  - Echo on 3/3/23 showed EF of 15-20% with severe global hypokinesis. - Continue with GDMT below. - Cardiology following.  Their recommendations appreciated:   -  IV Lasix 20 mg with plans to transition to PO    - Continue Toprol and Losartan (with plans to transition to [facility])   - Continue Spironolactone 25 mg daily.   - Strict I&Os, daily weights, and fluid restriction.   - Low Sodium Diet. - Cath scheduled for 3/7/23 with Dr. Janina Dia. Results pending. Hypertension  - Admit /110,   - Cardiology following. Their recommendation appreciated. See above. - IV Lasix 20 daily for now. Plans to switch to PO.   - Losartan 25, Toprol 25 BID  - Likely needs outpatient Pulm/Sleep Med f/u for CECILE eval per Cardiology note. Tobacco Usage  - Chronic 0.5 packs/day smoker since teenager  - Continue Nicoderm 14 mg daily. - Continued to encourage tobacco cessation efforts. Hyperlipidemia   - LDL elevated at 119.   - Pt not on home statin. - Consider starting during this Inpatient stay. DVT Prophylaxis: Lovenox SC  Diet: Diet NPO Exceptions are: Sips of Water with Meds. Code Status: Full Code    PT/OT Eval Status: Not yet ordered    Dispo - 1-2 days pending Cardiology recommendations. Annelise Hogan DO  PGY-1, Family Medicine    Addendum to Resident  Progress note:  Pt seen,examined and evaluated with resident and discussed regarding POC . I have reviewed the current history, physical findings, labs and assessment and plan , edited the note where appropriate and agree with note as documented by resident DO ( )    Patient off the floor this morning during my rounds for his [facility]. S/p successful SUSI cardioversion on 3/6/2023. No acute events overnight. Continue rest of the current care. Will follow.     Jolie Rivera MD  Hospitalist Physician

## 2023-03-07 NOTE — DISCHARGE INSTRUCTIONS
FOLLOW-UP APPOINTMENTS    [facility] OFFICE - Appointment on March 23rd at 9:30am with Иван Castanon MD, [facility]. [facility],  [facility], [address] [place] [address] [address], [address], [address] [place] [address]. Office #: [phone]. If you are unable to make this appointment, please call to reschedule. Directions to [place] [address] towards [place]. [address] [facility] exit. Right off exit. Cross over [employer]. Right on [address]. Left into hospital. Follow the signs to the emergency room ( turn left toward the Emergency room). Go right at the first stop sign. Just past the Emergency room at the second stop sign turn right and go up the ramp and park on the top level if possible. Go in the glass doors of the [facility] we on the top level of the garage [address]. As soon as you get in the door turn left and our office is the one with the glass doors. Return to [facility] with new or worsening symptoms. Visit [facility]/find-a-doctor to get a new [facility]) primary care provider, or call [phone].

## 2023-03-07 NOTE — CARE COORDINATION
Patient s/p cardiac cath today. Patient will need a staged PCI and a Pulm consult. He is being diuresed. Patient is from home and is normally independent at home. CM will continue to follow should any needs arise.

## 2023-03-07 NOTE — PROCEDURES
CARDIAC CATHETERIZATION REPORT     Procedure Date:  3/7/2023  Patient Name: Cierra Rodríguez  MRN: [id number] : 1960      : Kiko Cuellar MD      PROCEDURES PERFORMED  Left heart cath via right radial approach  Coronary angiography  Moderate sedation 15 min CPT 44243  US guidance for vascular access CPT 48448      INDICATION  Cardiomyopathy    PROCEDURE DESCRIPTION  Risks/benefits/alternatives/outcomes were discussed with patient and/or family in detail and informed consent was obtained. Patient was prepped and draped in the usual sterile fashion. Versed and fentanyl were used for conscious sedation. Then local anaesthetic was applied over right radial puncture site. Using a modified Seldinger technique, the right radial artery was selectively cannulated and a 6Fr Terumo sheath was inserted into right radial artery. Verapamil and nitroglycerin were administered through the sheath. Heparin was administered. Diagnostic 6Fr JL3.5 and JR4 were used to engage left and right coronary arteries respectively and obtain angiogram. LVEDP was obtained by using the JR4 diagnostic catheter. At the conclusion of the procedure, a TR band was placed over the puncture site and hemostasis was obtained. There were no immediate complications. I supervised the sedation with fentanyl and midazolam. An independent trained observer pushed meds at my direction. We monitored the patient's level of consciousness and vital signs/physiologic status throughout the procedure duration. At the end of the procedure, the radial sheath was removed and a TR band was placed over the arteriotomy site. Patient tolerated the procedure well. FINDINGS  Left main - mild diffuse disease  LAD - 80% diffuse stenosis in the midsegment.   Small diagonal branch is subtotally occluded  Left circumflex - large OM1 branch is subtotally occluded with CRITSÓBAL I flow  RCA - large dominant vessel, mild luminal irregularities    LVEDP: 25      SEDATION: Moderate conscious sedation was administered by qualified nursing personnel per my orders and under my direct supervision with continuous hemodynamic monitoring      COMPLICATIONS: None      BLOOD LOSS: 20 cc      FINAL DIAGNOSIS  Severe two-vessel CAD suggesting at least some component of ischemic cardiomyopathy  Severely elevated left-sided filling pressures suggesting volume overload      PLAN/RECOMMENDATIONS  - Diuresis for high filling pressures, 40 mg IV Lasix bolus given in the Cath Lab  - Place Velazquez or condom catheter  - Stat chest x-ray for hypoxia noted before the procedure requiring high level of supplemental oxygen with nonrebreather throughout the procedure which maintain his oxygen saturation in the 80s  - Consider pulm consult  - Once improved from oxygenation and fluid overload standpoint, high risk multivessel PCI can be planned.   Patient will likely need mechanical circulatory support for the procedure      Kiko Cuellar MD, [facility] [place] [address] Cardiology  [facility]  [phone] (c)

## 2023-03-07 NOTE — PRE SEDATION
Sedation Pre-Procedure Note    Patient Name: George Juarez   YOB: 1960  Room/Bed: Cath Pool Rm/NONE  Medical Record Number: [id number]  Date: 3/7/2023   Time: 11:10 AM       Indication: Cardiomyopathy    Consent: I have discussed with the patient and/or the patient representative the indication, alternatives, and the possible risks and/or complications of the planned procedure and the anesthesia methods. The patient and/or patient representative appear to understand and agree to proceed. Vital Signs:   Vitals:    03/07/23 0818   BP: (!) 134/91   Pulse: 78   Resp: 20   Temp: 98.4 °F (36.9 °C)   SpO2: 95%       Past Medical History:   has no past medical history on file. Past Surgical History:   has no past surgical history on file.     Medications:   Scheduled Meds:    sodium chloride flush  5-40 mL IntraVENous 2 times per day    sodium chloride flush  5-40 mL IntraVENous 2 times per day    spironolactone  25 mg Oral Daily    amiodarone  400 mg Oral BID    metoprolol succinate  25 mg Oral BID    sodium chloride flush  5-40 mL IntraVENous 2 times per day    enoxaparin  1 mg/kg (Order-Specific) SubCUTAneous BID    nicotine  1 patch TransDERmal Daily    furosemide  20 mg IntraVENous Daily    losartan  25 mg Oral Daily     Continuous Infusions:    sodium chloride      sodium chloride      sodium chloride       PRN Meds: sodium chloride flush, sodium chloride, LORazepam, prochlorperazine, sodium chloride flush, sodium chloride, sodium chloride flush, sodium chloride, polyethylene glycol, acetaminophen **OR** acetaminophen, perflutren lipid microspheres  Home Meds:   Prior to Admission medications    Not on File     Coumadin Use Last 7 Days:  no  Antiplatelet drug therapy use last 7 days: yes - aspirin  Other anticoagulant use last 7 days: no  Additional Medication Information:  no      Pre-Sedation Documentation and Exam:   I have personally completed a history, physical exam & review of systems for this patient (see notes). Vital signs have been reviewed (see flow sheet for vitals). I have reviewed the patient's history and review of systems.     Mallampati Airway Assessment:  Mallampati Class II - (soft palate, fauces & uvula are visible)    Prior History of Anesthesia Complications:   none    ASA Classification:  Class 2 - A normal healthy patient with mild systemic disease    Sedation/ Anesthesia Plan:   intravenous sedation    Medications Planned:   midazolam (Versed) intravenously and fentanyl intravenously    Patient is an appropriate candidate for plan of sedation: yes    Electronically signed by Mary Fernandes MD on 3/7/2023 at 11:10 AM

## 2023-03-08 LAB
ANION GAP SERPL CALCULATED.3IONS-SCNC: 11 MMOL/L (ref 3–16)
BASOPHILS ABSOLUTE: 0 K/UL (ref 0–0.2)
BASOPHILS RELATIVE PERCENT: 0.2 %
BUN BLDV-MCNC: 18 MG/DL (ref 7–20)
CALCIUM SERPL-MCNC: 9.1 MG/DL (ref 8.3–10.6)
CHLORIDE BLD-SCNC: 99 MMOL/L (ref 99–110)
CO2: 24 MMOL/L (ref 21–32)
CREAT SERPL-MCNC: 1.1 MG/DL (ref 0.8–1.3)
EOSINOPHILS ABSOLUTE: 0 K/UL (ref 0–0.6)
EOSINOPHILS RELATIVE PERCENT: 0.1 %
GFR SERPL CREATININE-BSD FRML MDRD: >60 ML/MIN/{1.73_M2}
GLUCOSE BLD-MCNC: 95 MG/DL (ref 70–99)
HCT VFR BLD CALC: 42.1 % (ref 40.5–52.5)
HEMOGLOBIN: 13.9 G/DL (ref 13.5–17.5)
LYMPHOCYTES ABSOLUTE: 1.2 K/UL (ref 1–5.1)
LYMPHOCYTES RELATIVE PERCENT: 12.3 %
MAGNESIUM: 2.2 MG/DL (ref 1.8–2.4)
MCH RBC QN AUTO: 31.1 PG (ref 26–34)
MCHC RBC AUTO-ENTMCNC: 33 G/DL (ref 31–36)
MCV RBC AUTO: 94.2 FL (ref 80–100)
MONOCYTES ABSOLUTE: 1.1 K/UL (ref 0–1.3)
MONOCYTES RELATIVE PERCENT: 10.6 %
NEUTROPHILS ABSOLUTE: 7.6 K/UL (ref 1.7–7.7)
NEUTROPHILS RELATIVE PERCENT: 76.8 %
PDW BLD-RTO: 14.3 % (ref 12.4–15.4)
PLATELET # BLD: 230 K/UL (ref 135–450)
PMV BLD AUTO: 8.7 FL (ref 5–10.5)
POTASSIUM SERPL-SCNC: 3.6 MMOL/L (ref 3.5–5.1)
RBC # BLD: 4.47 M/UL (ref 4.2–5.9)
SODIUM BLD-SCNC: 134 MMOL/L (ref 136–145)
WBC # BLD: 9.9 K/UL (ref 4–11)

## 2023-03-08 PROCEDURE — 6370000000 HC RX 637 (ALT 250 FOR IP): Performed by: INTERNAL MEDICINE

## 2023-03-08 PROCEDURE — 93312 ECHO TRANSESOPHAGEAL: CPT | Performed by: INTERNAL MEDICINE

## 2023-03-08 PROCEDURE — 85025 COMPLETE CBC W/AUTO DIFF WBC: CPT

## 2023-03-08 PROCEDURE — 99233 SBSQ HOSP IP/OBS HIGH 50: CPT | Performed by: NURSE PRACTITIONER

## 2023-03-08 PROCEDURE — 2580000003 HC RX 258: Performed by: INTERNAL MEDICINE

## 2023-03-08 PROCEDURE — 6360000002 HC RX W HCPCS: Performed by: NURSE PRACTITIONER

## 2023-03-08 PROCEDURE — 6370000000 HC RX 637 (ALT 250 FOR IP): Performed by: NURSE PRACTITIONER

## 2023-03-08 PROCEDURE — 6360000002 HC RX W HCPCS: Performed by: INTERNAL MEDICINE

## 2023-03-08 PROCEDURE — 2060000000 HC ICU INTERMEDIATE R&B

## 2023-03-08 PROCEDURE — 83735 ASSAY OF MAGNESIUM: CPT

## 2023-03-08 PROCEDURE — 80048 BASIC METABOLIC PNL TOTAL CA: CPT

## 2023-03-08 PROCEDURE — 99232 SBSQ HOSP IP/OBS MODERATE 35: CPT | Performed by: INTERNAL MEDICINE

## 2023-03-08 PROCEDURE — 36415 COLL VENOUS BLD VENIPUNCTURE: CPT

## 2023-03-08 RX ORDER — ASPIRIN 81 MG/1
81 TABLET, CHEWABLE ORAL DAILY
Status: DISCONTINUED | OUTPATIENT
Start: 2023-03-08 | End: 2023-03-13

## 2023-03-08 RX ORDER — CLOPIDOGREL BISULFATE 75 MG/1
75 TABLET ORAL DAILY
Status: DISCONTINUED | OUTPATIENT
Start: 2023-03-10 | End: 2023-03-14 | Stop reason: HOSPADM

## 2023-03-08 RX ORDER — CLOPIDOGREL 300 MG/1
600 TABLET, FILM COATED ORAL ONCE
Status: COMPLETED | OUTPATIENT
Start: 2023-03-09 | End: 2023-03-09

## 2023-03-08 RX ORDER — ATORVASTATIN CALCIUM 40 MG/1
40 TABLET, FILM COATED ORAL NIGHTLY
Status: DISCONTINUED | OUTPATIENT
Start: 2023-03-08 | End: 2023-03-14 | Stop reason: HOSPADM

## 2023-03-08 RX ADMIN — Medication 10 ML: at 20:12

## 2023-03-08 RX ADMIN — FUROSEMIDE 40 MG: 10 INJECTION, SOLUTION INTRAMUSCULAR; INTRAVENOUS at 20:12

## 2023-03-08 RX ADMIN — ASPIRIN 81 MG 81 MG: 81 TABLET ORAL at 14:43

## 2023-03-08 RX ADMIN — FUROSEMIDE 40 MG: 10 INJECTION, SOLUTION INTRAMUSCULAR; INTRAVENOUS at 09:17

## 2023-03-08 RX ADMIN — AMIODARONE HYDROCHLORIDE 400 MG: 200 TABLET ORAL at 20:12

## 2023-03-08 RX ADMIN — SPIRONOLACTONE 25 MG: 25 TABLET, FILM COATED ORAL at 09:18

## 2023-03-08 RX ADMIN — METOPROLOL SUCCINATE 25 MG: 25 TABLET, EXTENDED RELEASE ORAL at 09:18

## 2023-03-08 RX ADMIN — ENOXAPARIN SODIUM 105 MG: 150 INJECTION SUBCUTANEOUS at 09:17

## 2023-03-08 RX ADMIN — FUROSEMIDE 40 MG: 10 INJECTION, SOLUTION INTRAMUSCULAR; INTRAVENOUS at 14:00

## 2023-03-08 RX ADMIN — ATORVASTATIN CALCIUM 40 MG: 40 TABLET, FILM COATED ORAL at 20:12

## 2023-03-08 RX ADMIN — ENOXAPARIN SODIUM 105 MG: 150 INJECTION SUBCUTANEOUS at 20:13

## 2023-03-08 RX ADMIN — Medication 10 ML: at 09:18

## 2023-03-08 RX ADMIN — METOPROLOL SUCCINATE 25 MG: 25 TABLET, EXTENDED RELEASE ORAL at 20:12

## 2023-03-08 RX ADMIN — AMIODARONE HYDROCHLORIDE 400 MG: 200 TABLET ORAL at 09:18

## 2023-03-08 NOTE — PROGRESS NOTES
INPATIENT PULMONARY CRITICAL CARE PROGRESS NOTE      Reason for visit    new onset hypoxemia, patient in Cath Lab with saturations in the 50s, improved to 80s with nonrebreather facemask    SUBJECTIVE: Patient when seen this morning was feeling better, patient did not have any significant cough expectorant shortness of breath or wheezing, patient had a comfortable night, patient is not hypoxemic now and was on room air oxygen saturation 98%, patient was not having any significant chest pain or palpitation, patient was afebrile and hemodynamically maintained, patient had sinus rhythm on the monitor, patient has had good urine output overnight with cumulative fluid balance of -7.2 L, patient's glycemic control was acceptable, no other pertinent review of system of concern         Physical Exam:  Blood pressure 114/64, pulse 68, temperature 98.1 °F (36.7 °C), temperature source Oral, resp. rate 16, height 6' 2\" (1.88 m), weight 202 lb 12.8 oz (92 kg), SpO2 98 %.'     Constitutional:  No acute distress. HENT:  Oropharynx is clear and moist. No thyromegaly. Eyes:  Conjunctivae are normal. Pupils equal, round, and reactive to light. No scleral icterus. Neck: . No tracheal deviation present. No obvious thyroid mass. Cardiovascular: Normal rate, regular rhythm, normal heart sounds. No right ventricular heave. No lower extremity edema. Pulmonary/Chest: No wheezes. No significant rales. Chest wall is not dull to percussion. No accessory muscle usage or stridor. Abdominal: Soft. Bowel sounds present. No distension or hernia. No tenderness. Musculoskeletal: No cyanosis. No clubbing. No obvious joint deformity. Lymphadenopathy: No cervical or supraclavicular adenopathy. Skin: Skin is warm and dry. No rash or nodules on the exposed extremities. Psychiatric: Normal mood and affect. Behavior is normal.  No anxiety. Neurologic: Alert, awake and oriented. PERRL.   Speech fluent    Results:  CBC:   Recent Labs     03/06/23  0654 03/07/23  0732 03/07/23  1149 03/08/23  0736   WBC 8.9 11.0  --  9.9   HGB 14.9 14.1 14.9 13.9   HCT 45.0 42.5  --  42.1   MCV 95.1 94.4  --  94.2    220  --  230     BMP:   Recent Labs     03/06/23  0654 03/07/23  0732 03/07/23  1149 03/08/23  0736   * 135*  --  134*   K 4.3 3.8  --  3.6   CL 99 101  --  99   CO2 24 21  --  24   BUN 17 16  --  18   CREATININE 1.0 1.0 1.1 1.1         Imaging:  I have reviewed radiology images personally.    XR CHEST PORTABLE   Final Result   No acute cardiopulmonary findings         XR CHEST PORTABLE   Final Result   1. Cardiomegaly with bilateral lung infiltrates and bilateral pleural   effusions, likely related to pulmonary edema.  Superimposed pneumonia cannot   be excluded.           XR CHEST PORTABLE    Result Date: 3/7/2023  EXAMINATION: ONE XRAY VIEW OF THE CHEST 3/7/2023 12:16 pm COMPARISON: None. HISTORY: ORDERING SYSTEM PROVIDED HISTORY: new hypoxia TECHNOLOGIST PROVIDED HISTORY: Reason for exam:->new hypoxia Reason for Exam: new hypoxia FINDINGS: Mild cardiomegaly.  No acute airspace infiltrate.  No pneumothorax or pleural effusion.     No acute cardiopulmonary findings     XR CHEST PORTABLE    Result Date: 3/3/2023  EXAMINATION: ONE XRAY VIEW OF THE CHEST 3/3/2023 6:13 am COMPARISON: None. HISTORY: ORDERING SYSTEM PROVIDED HISTORY: sob TECHNOLOGIST PROVIDED HISTORY: Reason for exam:->sob Reason for Exam: sob FINDINGS: The a cardiac silhouette is enlarged.  Mediastinal contours are normal. There are bilateral lung infiltrates with bilateral pleural effusions.  No pneumothorax.  The visualized osseous structures are unremarkable.     1. Cardiomegaly with bilateral lung infiltrates and bilateral pleural effusions, likely related to pulmonary edema.  Superimposed pneumonia cannot be excluded.             Assessment:  Principal Problem:    Atrial fibrillation with RVR (HCC)  Active Problems:    Acute systolic heart failure (HCC)     Cardiomyopathy ([address]    Uncontrolled hypertension    Acute respiratory failure with hypoxia (HCC)    Acute pulmonary edema (HCC)    Mitral regurgitation    Biatrial enlargement    Pulmonary hypertension (HCC)    Overweight (BMI 25.0-29. 9)    Current smoker    Alcohol use  Resolved Problems:    * No resolved hospital problems.  *          Plan:   Oxygen supplementation, if required, to see keep saturation between 90 and 94% only  Patient now on room air oxygen  Patient has been started on Lasix 3 times a day as per cardiology  Patient does not have any adventitious sounds on auscultation of the chest  Patient's x-ray chest does not show any more pulm edema  Patient does not appear to have any significant pneumonic process with needs any antibiotics  Patient does not have any acute bronchospasm on evaluation and does not need any steroids or any bronchodilators on regular basis  Patient does have a history of smoking  Patient also has a significant ischemic cardiomyopathy and patient will be needing high risk PCI at some point later once the patient stabilizes as being stated by cardiology  Patient also has been overweight along with that patient has biatrial enlargement and there is a possibility that patient has sleep apnea and may benefit from HST as an outpatient  Patient is supposed to have a high risk PCI of the block coronary vessel-patient wants it to be done under anesthesia as patient states that he was quite anxious under conscious sedation-conveyed to the nursing  Keep negative fluid balance  Monitor input output and BMP  Correct electrolytes on whenever necessary basis  Cardiac medications as per cardiology  Further management and evaluation of mitral regurgitation as per cardiology  Smoking cessation advised  Nicotine replacement therapy if the patient wants  Patient is on therapeutic doses of Lovenox as per cardiology-internal medicine/cardiology decide upon continuation at the same dose  PUD prophylaxis    Case discussed with patient and nursing    No other recommendations from pulmonary/critical care standpoint of view- will sign off-please call on as needed basis        Electronically signed by:  Heike Lane MD    3/8/2023    8:46 AM.

## 2023-03-08 NOTE — PROGRESS NOTES
Patient's EF (Ejection Fraction) is less than 40%    Heart Failure Medications:  Diuretics[de-identified] Furosemide    (One of the following REQUIRED for EF </= 40%/SYSTOLIC FAILURE but MAY be used in EF% >40%/DIASTOLIC FAILURE)        ACE[de-identified] None        ARB[de-identified] None         ARNI[de-identified] None    (Beta Blockers)  NON- Evidenced Based Beta Blocker (for EF% >40%/DIASTOLIC FAILURE): Metoprolol TARTrate- Lopressor    Evidenced Based Beta Blocker::(REQUIRED for EF% <40%/SYSTOLIC FAILURE) Metoprolol SUCCinate- Toprol XL  . .................................................................................................................................................. Patient's weights and intake/output reviewed: Yes    Patient's Last Weight: 202 lbs obtained by standing scale. Difference of 5 lbs less than last documented weight. Intake/Output Summary (Last 24 hours) at 3/8/2023 1205  Last data filed at 3/8/2023 0900  Gross per 24 hour   Intake 1320 ml   Output 3400 ml   Net -2080 ml       Daily Weight log at bedside and being used: [employer] Provided: yes    Comorbidities Reviewed Yes    Patient has no past medical history on file. >>For CHF and Comorbidity documentation on Education Time and Topics, please see Education Tab    Progressive Mobility Assessment:  What is this patient's Current Level of Mobility?: Ambulatory-Up Ad Astrid  How was this patient Mobilized today?: Edge of Bed, Up to Chair,  Up to Toilet/Shower, and Up in Room, ambulated 20 ft                 With Whom? Self                 Level of Difficulty/Assistance: Independent     Pt up in chair at this time on room air. Pt denies shortness of breath. Pt without lower extremity edema.      Patient and/or Family's stated Goal of Care this Admission: reduce shortness of breath, increase activity tolerance, better understand heart failure and disease management, be more comfortable, and reduce lower extremity edema prior to discharge        :

## 2023-03-08 NOTE — PROGRESS NOTES
[address]   CHF Progress Note    Admit Date:  3/3/2023  HPI:    Chief Complaint   Patient presents with    Shortness of Breath     Started a couple weeks ago, worst the last couple days. Interval history: Ian Savage is being followed for acute CHF. Admitted with worse shortness of breath, EF 15-20%. Subjective:  Mr. Jacobs Gutting well with IV lasix. No chest pain. No shortness of breath. No edema.      Objective:   /64   Pulse 68   Temp 98.1 °F (36.7 °C) (Oral)   Resp 16   Ht 6' 2\" (1.88 m)   Wt 202 lb 12.8 oz (92 kg)   SpO2 98%   BMI 26.04 kg/m²     Intake/Output Summary (Last 24 hours) at 3/8/2023 0854  Last data filed at 3/8/2023 0448  Gross per 24 hour   Intake 1080 ml   Output 3100 ml   Net -2020 ml       NYHA: III    Physical Exam:  General:  Awake, alert, NAD  Skin:  Warm and dry  Neck:  JVD ~10, less HJR   Chest:  Clear to auscultation, no wheezes/rhonchi/rales  Telemetry: SR  Cardiovascular:  irregular  S1S2, no m/r/g   Abdomen:  Soft, nontender, +bowel sounds  Extremities:  no  bilateral lower extremity edema, cool hands and feet     Medications:    sodium chloride flush  5-40 mL IntraVENous 2 times per day    furosemide  40 mg IntraVENous TID    sodium chloride flush  5-40 mL IntraVENous 2 times per day    spironolactone  25 mg Oral Daily    amiodarone  400 mg Oral BID    metoprolol succinate  25 mg Oral BID    sodium chloride flush  5-40 mL IntraVENous 2 times per day    enoxaparin  1 mg/kg (Order-Specific) SubCUTAneous BID    nicotine  1 patch TransDERmal Daily      sodium chloride      sodium chloride      sodium chloride         Lab Data:  CBC:   Recent Labs     03/06/23  0654 03/07/23  0732 03/07/23  1149 03/08/23  0736   WBC 8.9 11.0  --  9.9   HGB 14.9 14.1 14.9 13.9    220  --  230     BMP:    Recent Labs     03/06/23  0654 03/07/23  0732 03/07/23  1149 03/08/23  0736   * 135*  --  134*   K 4.3 3.8  --  3.6   CO2 24 21  --  24   BUN 17 16 --  18   CREATININE 1.0 1.0 1.1 1.1     INR:  No results for input(s): INR in the last 72 hours. BNP:    Recent Labs     03/07/23  0732   PROBNP 3,819*     Lab Results   Component Value Date    LVEF 18 03/03/2023       Testing:    Echo 3/3/2023:   Technically difficult study due to patient is a smoker and poor acoustic   window. Difficult to assess left ventricular systolic function due to arrhythmia but appears severely reduced ef 15-20%. Severe global HK with regional variation. Mild concentric left ventricular hypertrophy. Left ventricular diastolic filling pressure is elevated. Mild to moderate mitral regurgitation. Moderate Bi-atrial enlargement. Right ventricular systolic function is mildly reduced . Mild tricuspid regurgitation. Systolic pulmonic artery pressure (SPAP) is estimated at 41 mmHg consistent with mild pulmonary hypertension (Right atrial pressure of 8 mmHg). [facility] 3/7/2023  FINDINGS  Left main - mild diffuse disease  LAD - 80% diffuse stenosis in the midsegment. Small diagonal branch is subtotally occluded  Left circumflex - large OM1 branch is subtotally occluded with CRISTÓBAL I flow  RCA - large dominant vessel, mild luminal irregularities     LVEDP: 25  FINAL DIAGNOSIS  Severe two-vessel CAD suggesting at least some component of ischemic cardiomyopathy  Severely elevated left-sided filling pressures suggesting volume overload    Principal Problem:    Atrial fibrillation with RVR (HCC)  Active Problems:    Acute systolic heart failure (HCC)    Cardiomyopathy (HCC)    Uncontrolled hypertension    Acute respiratory failure with hypoxia (HCC)    Acute pulmonary edema (HCC)    Mitral regurgitation    Biatrial enlargement    Pulmonary hypertension (HCC)    Overweight (BMI 25.0-29. 9)    Current smoker    Alcohol use  Resolved Problems:    * No resolved hospital problems.  *       03/08/23 0448 202 lb 12.8 oz (92 kg) Actual;Standing scale     03/07/23 0553 207 lb 9.6 oz (94.2 kg) Standing scale     03/06/23 0626 210 lb (95.3 kg) Actual;Standing scale     03/05/23 0455 209 lb 9.6 oz (95.1 kg) Actual;Standing scale     03/04/23 0458 211 lb (95.7 kg) Standing scale     03/03/23 1530 235 lb (106.6 kg) --     03/03/23 0908 235 lb (106.6 kg) --           Assessment:  Acute HFrEF  Biventricular heart failure  Cardiomyopathy LVEF 15-20%; likely mixed ishcemic and nonischemic   CAD - s/p [facility] 3/7/2023 with severe 2 vessel CAD   Afib- EP following  HTN  Hx of alcohol abuse      Plan:  Daily weights  Daily BMP  IV lasix to 40mg TID - continue for today  Off  losartan to allow for more diuresis at this time and then will plan to start entresto prior to discharge   Continue Spironolactone (aldactone)   Continue toprol 25mg BID    Lovenox until procedure completed   Amiodarone per EP   Add aspirin and statin    Plan for high risk stage PCI in the coming week once volume status is optimized - will plan for this Friday with anesthesia     Recommend outpatient CECILE evaluation     Core measures for HFrEF (EF <40%):  EF: 15-20%   ICD: NA needs GDMT titration 1st   ACEi/ARB/ARNI: entresto at discharge  BB:toprol   Elie: 25 mg Spironolactone (aldactone)  SGLT2: to be added     Education provided today Disease process and medications discussed. Questions answered fully.   Total Time spent educating today 10 minutes     BRANT Siddiqui CNP,  3/8/2023, 2:14 PM

## 2023-03-08 NOTE — PLAN OF CARE
Patient's EF (Ejection Fraction) is less than 40%    Heart Failure Medications:  Diuretics[de-identified] Furosemide    (One of the following REQUIRED for EF </= 40%/SYSTOLIC FAILURE but MAY be used in EF% >40%/DIASTOLIC FAILURE)        ACE[de-identified] None        ARB[de-identified] None         ARNI[de-identified] None    (Beta Blockers)  NON- Evidenced Based Beta Blocker (for EF% >40%/DIASTOLIC FAILURE): None    Evidenced Based Beta Blocker::(REQUIRED for EF% <40%/SYSTOLIC FAILURE) Metoprolol SUCCinate- Toprol XL  . .................................................................................................................................................. Patient's weights and intake/output reviewed: Yes    Patient's Last Weight: 207 lbs obtained by standing scale. Difference of 3 lbs less than last documented weight. Intake/Output Summary (Last 24 hours) at 3/7/2023 2214  Last data filed at 3/7/2023 1817  Gross per 24 hour   Intake 840 ml   Output 2150 ml   Net -1310 ml       Daily Weight log at bedside and being used: [employer] Provided: yes    Comorbidities Reviewed Yes    Patient has no past medical history on file. >>For CHF and Comorbidity documentation on Education Time and Topics, please see Education Tab    Progressive Mobility Assessment:  What is this patient's Current Level of Mobility?: Ambulatory-Up Ad Astrid  How was this patient Mobilized today?: Edge of Bed, Up to Chair, Bedside Commode,  Up to Toilet/Shower, Up in Room, and Up in Hallway, ambulated 140 ft                 With Whom? Self                 Level of Difficulty/Assistance: Independent     Pt resting in bed at this time on room air. Pt denies shortness of breath. Pt without lower extremity edema.      Patient and/or Family's stated Goal of Care this Admission: reduce shortness of breath, increase activity tolerance, better understand heart failure and disease management, be more comfortable, and reduce lower extremity edema prior to discharge        :

## 2023-03-08 NOTE — PROGRESS NOTES
Progress Note      PCP: No primary care provider on file. Date of Admission: 3/3/2023    Chief Complaint: Palpitations and Shortness of Breath    Hospital Course:   Patient is a 60-year-old male with past medical history of tobacco use disorder (0.5 pack/day) and alcohol usage (3-5 standard drinks daily). Patient presented to [facility] ED on 3/3/2023 with complaints of palpitations, shortness of breath, dyspnea on exertion, fatigue for the last 3 weeks. He denied chest pain, orthopnea, or bilateral LE edema. 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 Vital signs on arrival showed heart rate of 176, BP of 145/110. An EKG showed new onset atrial fibrillation with RVR with heart rate of 177. He did not note a prior history of Afib and endorsed sporadic usage of ASA 81. Troponins were negative x3 and a proBNP was 3971. Chest x-ray showed cardiomegaly, bilateral lung infiltrates, bilateral pleural effusions, likely related to pulmonary edema. CBC and CMP were otherwise unremarkable. Hemoglobin A1c of 5.4%. CHADsVasc Score of 2 noted and patient was started on therapeutic Lovenox, Lasix, diltiazem drip in the ED. Echo showed ejection fraction 15-20% with severe global hypokinesis, mild LVH, and bi-atrial enlargement. Cardiology was consulted on 3/3/2023. They recommended changing patient from diltiazem drip to amiodarone given severe cardiomyopathy and heart failure. They also continued with IV Lasix and plan to pursue SUSI/CV on 3/6/2023. The patient underwent successful SUSI/CV on 3/6/23 with Dr. Toan Diaz. On 3/7/23, pt underwent cath with Dr. Karol Schulz. Results showed LAD 80% occlusion, Diagonal Branch subtotally occluded, LCX/Large OM1 branch is subtotally occluded. The patient developed hypoxia during the procedure and Pulmonology was consulted. A high risk PCI is planned in the upcoming week. Subjective:   Patient seen at bedside this AM without acute complaints. No events reported overnight. He denies CP, SOB, palpitations, lightheadedness/dizziness, vision changes, or headaches today. Possibly planning for high risk PCI later this week. Pt states that he has never felt volume overloaded but has felt better since his CV. He states he believes he lost 5 lbs in water weight since yesterday.     Medications:  Reviewed    Infusion Medications    sodium chloride      sodium chloride      sodium chloride       Scheduled Medications    sodium chloride flush  5-40 mL IntraVENous 2 times per day    furosemide  40 mg IntraVENous TID    sodium chloride flush  5-40 mL IntraVENous 2 times per day    spironolactone  25 mg Oral Daily    amiodarone  400 mg Oral BID    metoprolol succinate  25 mg Oral BID    sodium chloride flush  5-40 mL IntraVENous 2 times per day    enoxaparin  1 mg/kg (Order-Specific) SubCUTAneous BID    nicotine  1 patch TransDERmal Daily     PRN Meds: sodium chloride flush, sodium chloride, prochlorperazine, sodium chloride flush, sodium chloride, sodium chloride flush, sodium chloride, polyethylene glycol, acetaminophen **OR** acetaminophen, perflutren lipid microspheres      Intake/Output Summary (Last 24 hours) at 3/8/2023 1124  Last data filed at 3/8/2023 0900  Gross per 24 hour   Intake 1320 ml   Output 3400 ml   Net -2080 ml     Physical Exam Performed:  /64   Pulse 68   Temp 98.1 °F (36.7 °C) (Oral)   Resp 16   Ht 6' 2\" (1.88 m)   Wt 202 lb 12.8 oz (92 kg)   SpO2 98%   BMI 26.04 kg/m²     General appearance: No apparent distress, appears stated age and cooperative.  HEENT: Conjunctivae/corneas clear.  Neck:  No jugular venous distention.   Respiratory:  Normal respiratory effort. Clear to auscultation, bilaterally without Rales/Wheezes/Rhonchi.  Cardiovascular: Regular rate, but slightly irregular rhythm without murmurs, rubs or gallops.  Abdomen: Soft, non-tender, non-distended   Musculoskeletal: No clubbing, cyanosis or edema bilaterally.  Full  range of motion without deformity. Skin: Skin color, texture, turgor normal.  No rashes or lesions. Neurologic:  No focal neurological deficit  Psychiatric: Alert and oriented, thought content appropriate, normal insight      Labs:   Recent Labs     03/06/23  0654 03/07/23  0732 03/07/23  1149 03/08/23  0736   WBC 8.9 11.0  --  9.9   HGB 14.9 14.1 14.9 13.9   HCT 45.0 42.5  --  42.1    220  --  230     Recent Labs     03/06/23  0654 03/07/23  0732 03/07/23  1149 03/08/23  0736   * 135*  --  134*   K 4.3 3.8  --  3.6   CL 99 101  --  99   CO2 24 21  --  24   BUN 17 16  --  18   CREATININE 1.0 1.0 1.1 1.1   CALCIUM 9.5 8.9  --  9.1       Radiology:  XR CHEST PORTABLE   Final Result   No acute cardiopulmonary findings         XR CHEST PORTABLE   Final Result   1. Cardiomegaly with bilateral lung infiltrates and bilateral pleural   effusions, likely related to pulmonary edema. Superimposed pneumonia cannot   be excluded. Assessment/Plan:  Active Hospital Problems    Diagnosis     Acute respiratory failure with hypoxia ([address] [J96.01]      Priority: Medium    Acute pulmonary edema (HCC) [J81.0]      Priority: Medium    Mitral regurgitation [I34.0]      Priority: Medium    Biatrial enlargement [I51.7]      Priority: Medium    Pulmonary hypertension ([address] [I27.20]      Priority: Medium    Overweight (BMI 25.0-29. 9) [E66.3]      Priority: Medium    Current smoker [F17.200]      Priority: Medium    Alcohol use [Z78.9]      Priority: Medium    Acute systolic heart failure (HCC) [I50.21]      Priority: Medium    Cardiomyopathy ([address] [I42.9]      Priority: Medium    Uncontrolled hypertension [I10]      Priority: Medium    Atrial fibrillation with RVR (HCC) [I48.91]      Priority: Medium     New onset A-fib with RVR  - Admit EKG noted A-fib with RVR, . - Troponin x3 negative.  TSH 1.41.   - CHADSVASC score 2 (CHF, HTN)  - Likely needs long-term anticoagulation at discharge  - Continue Tele monitoring for now. - Cardiology/EP following. Their recommendations appreciated:    - Continue Amiodarone 400 mg BID PO for now. Plans to taper on 200 mg daily at discharge. - Continue Therapeutic Lovenox dosing with plans to transition to [address] after procedures   - Continue Toprol   - Successful SUSI/CV on 3/6/23 with Dr. Malcolm Oquendo.  - Following peripherally since 3/7/23     New onset Systolic CHF, HFrEF 16-18%/FMRPNZTS Cardiomyopathy  - Admit CXR showed cardiomegaly, bilateral lung infiltrates, bilateral pleural effusions, likely related to pulm edema.  - Admit pro-BNP 3971  - Echo on 3/3/23 showed EF of 15-20% with severe global hypokinesis. - Continue with GDMT below. - Cardiology following. Their recommendations appreciated:   -  IV Lasix 40 mg TID   - Continue Toprol. Discontinued Losartan. Plans to transition to [facility] at discharge. - Continue Spironolactone 25 mg daily.   - Strict I&Os, daily weights, and fluid restriction.   - Low Sodium Diet. - Outpatient CECILE evaluation.   - Cath on 3/7/23 with Dr. Malou Driver. Results in Hospital Course. - Pt developed hypoxia in cath procedure. Pulm consulted and CXR was negative for acute cardiopulmonary processes. Recommended CECILE eval in outpatient. Signed off on 3/8/23. Pt currently on RA at 98%. - Plans for High Risk PCI later this week. Hypertension  - Admit /110,   - Cardiology following. Their recommendation appreciated. See above. - IV Lasix 40 TID for now  - Toprol 25 BID, Spironolactone as above. - Likely needs outpatient Pulm/Sleep Med f/u for CECILE eval per Cardiology note. Tobacco Usage  - Chronic 0.5 packs/day smoker since teenager  - Continue Nicoderm 14 mg daily. - Continued to encourage tobacco cessation efforts. Hyperlipidemia   - LDL elevated at 119.   - Pt not on home statin. - Consider starting during this Inpatient stay. DVT Prophylaxis: Lovenox SC  Diet: ADULT DIET; Regular; Low Sodium (2 gm).    Code Status: Full Code    PT/OT Eval Status: Not yet ordered    Dispo - Home, pending High Risk PCI intervention. Dianna Franco DO  PGY-1, Family Medicine    Addendum to Resident  Progress note:  Pt seen,examined and evaluated with resident and discussed regarding POC . I have reviewed the current history, physical findings, labs and assessment and plan , edited the note where appropriate and agree with note as documented by resident DO ( )    Patient seen and examined this morning. Reports feeling better. Offers no new complaints. S/p LHC on 3/7/2023    FINDINGS  Left main - mild diffuse disease  LAD - 80% diffuse stenosis in the midsegment. Small diagonal branch is subtotally occluded  Left circumflex - large OM1 branch is subtotally occluded with CRISTÓBAL I flow  RCA - large dominant vessel, mild luminal irregularities     LVEDP: 25    -Cardiology plan for staged PCI coming Friday, 3/10/2023 under general anesthesia. Confirmed with Tasneem Ojeda (NP) with cardiology.     Antonia Wilks MD  Hospitalist Physician

## 2023-03-09 LAB
ANION GAP SERPL CALCULATED.3IONS-SCNC: 14 MMOL/L (ref 3–16)
ANTI-XA UNFRAC HEPARIN: 0.23 IU/ML (ref 0.3–0.7)
ANTI-XA UNFRAC HEPARIN: 0.3 IU/ML (ref 0.3–0.7)
BASOPHILS ABSOLUTE: 0 K/UL (ref 0–0.2)
BASOPHILS RELATIVE PERCENT: 0.5 %
BUN BLDV-MCNC: 18 MG/DL (ref 7–20)
CALCIUM SERPL-MCNC: 9.1 MG/DL (ref 8.3–10.6)
CHLORIDE BLD-SCNC: 99 MMOL/L (ref 99–110)
CO2: 20 MMOL/L (ref 21–32)
CREAT SERPL-MCNC: 1 MG/DL (ref 0.8–1.3)
EKG ATRIAL RATE: 170 BPM
EKG ATRIAL RATE: 68 BPM
EKG DIAGNOSIS: NORMAL
EKG DIAGNOSIS: NORMAL
EKG P AXIS: 52 DEGREES
EKG P-R INTERVAL: 186 MS
EKG Q-T INTERVAL: 394 MS
EKG Q-T INTERVAL: 500 MS
EKG QRS DURATION: 104 MS
EKG QRS DURATION: 106 MS
EKG QTC CALCULATION (BAZETT): 516 MS
EKG QTC CALCULATION (BAZETT): 531 MS
EKG R AXIS: 14 DEGREES
EKG R AXIS: 39 DEGREES
EKG T AXIS: 82 DEGREES
EKG T AXIS: 91 DEGREES
EKG VENTRICULAR RATE: 103 BPM
EKG VENTRICULAR RATE: 68 BPM
EOSINOPHILS ABSOLUTE: 0.1 K/UL (ref 0–0.6)
EOSINOPHILS RELATIVE PERCENT: 0.6 %
GFR SERPL CREATININE-BSD FRML MDRD: >60 ML/MIN/{1.73_M2}
GLUCOSE BLD-MCNC: 91 MG/DL (ref 70–99)
HCT VFR BLD CALC: 44.2 % (ref 40.5–52.5)
HEMOGLOBIN: 14.6 G/DL (ref 13.5–17.5)
LYMPHOCYTES ABSOLUTE: 1.2 K/UL (ref 1–5.1)
LYMPHOCYTES RELATIVE PERCENT: 13.3 %
MCH RBC QN AUTO: 31.3 PG (ref 26–34)
MCHC RBC AUTO-ENTMCNC: 32.9 G/DL (ref 31–36)
MCV RBC AUTO: 95.1 FL (ref 80–100)
MONOCYTES ABSOLUTE: 0.8 K/UL (ref 0–1.3)
MONOCYTES RELATIVE PERCENT: 9.4 %
NEUTROPHILS ABSOLUTE: 6.8 K/UL (ref 1.7–7.7)
NEUTROPHILS RELATIVE PERCENT: 76.2 %
PDW BLD-RTO: 14.1 % (ref 12.4–15.4)
PLATELET # BLD: 264 K/UL (ref 135–450)
PMV BLD AUTO: 8.4 FL (ref 5–10.5)
POTASSIUM REFLEX MAGNESIUM: 3.6 MMOL/L (ref 3.5–5.1)
RBC # BLD: 4.65 M/UL (ref 4.2–5.9)
SODIUM BLD-SCNC: 133 MMOL/L (ref 136–145)
WBC # BLD: 8.9 K/UL (ref 4–11)

## 2023-03-09 PROCEDURE — 93010 ELECTROCARDIOGRAM REPORT: CPT | Performed by: INTERNAL MEDICINE

## 2023-03-09 PROCEDURE — 80048 BASIC METABOLIC PNL TOTAL CA: CPT

## 2023-03-09 PROCEDURE — 93005 ELECTROCARDIOGRAM TRACING: CPT | Performed by: INTERNAL MEDICINE

## 2023-03-09 PROCEDURE — 6360000002 HC RX W HCPCS: Performed by: NURSE PRACTITIONER

## 2023-03-09 PROCEDURE — 6370000000 HC RX 637 (ALT 250 FOR IP): Performed by: INTERNAL MEDICINE

## 2023-03-09 PROCEDURE — 6370000000 HC RX 637 (ALT 250 FOR IP): Performed by: NURSE PRACTITIONER

## 2023-03-09 PROCEDURE — 6360000002 HC RX W HCPCS: Performed by: STUDENT IN AN ORGANIZED HEALTH CARE EDUCATION/TRAINING PROGRAM

## 2023-03-09 PROCEDURE — 2060000000 HC ICU INTERMEDIATE R&B

## 2023-03-09 PROCEDURE — 85025 COMPLETE CBC W/AUTO DIFF WBC: CPT

## 2023-03-09 PROCEDURE — 36415 COLL VENOUS BLD VENIPUNCTURE: CPT

## 2023-03-09 PROCEDURE — 2580000003 HC RX 258: Performed by: INTERNAL MEDICINE

## 2023-03-09 PROCEDURE — 99233 SBSQ HOSP IP/OBS HIGH 50: CPT | Performed by: NURSE PRACTITIONER

## 2023-03-09 PROCEDURE — 85520 HEPARIN ASSAY: CPT

## 2023-03-09 RX ORDER — HEPARIN SODIUM 1000 [USP'U]/ML
60 INJECTION, SOLUTION INTRAVENOUS; SUBCUTANEOUS ONCE
Status: DISCONTINUED | OUTPATIENT
Start: 2023-03-09 | End: 2023-03-09

## 2023-03-09 RX ORDER — HEPARIN SODIUM 1000 [USP'U]/ML
2000 INJECTION, SOLUTION INTRAVENOUS; SUBCUTANEOUS PRN
Status: DISCONTINUED | OUTPATIENT
Start: 2023-03-09 | End: 2023-03-12

## 2023-03-09 RX ORDER — METOPROLOL SUCCINATE 50 MG/1
50 TABLET, EXTENDED RELEASE ORAL 2 TIMES DAILY
Status: DISCONTINUED | OUTPATIENT
Start: 2023-03-09 | End: 2023-03-12

## 2023-03-09 RX ORDER — HEPARIN SODIUM 1000 [USP'U]/ML
4000 INJECTION, SOLUTION INTRAVENOUS; SUBCUTANEOUS PRN
Status: DISCONTINUED | OUTPATIENT
Start: 2023-03-09 | End: 2023-03-12

## 2023-03-09 RX ORDER — HEPARIN SODIUM 10000 [USP'U]/100ML
1360 INJECTION, SOLUTION INTRAVENOUS CONTINUOUS
Status: DISCONTINUED | OUTPATIENT
Start: 2023-03-09 | End: 2023-03-10 | Stop reason: CLARIF

## 2023-03-09 RX ADMIN — SPIRONOLACTONE 25 MG: 25 TABLET, FILM COATED ORAL at 09:44

## 2023-03-09 RX ADMIN — HEPARIN SODIUM 1000 UNITS/HR: 10000 INJECTION, SOLUTION INTRAVENOUS at 11:58

## 2023-03-09 RX ADMIN — ATORVASTATIN CALCIUM 40 MG: 40 TABLET, FILM COATED ORAL at 20:55

## 2023-03-09 RX ADMIN — METOPROLOL SUCCINATE 50 MG: 50 TABLET, EXTENDED RELEASE ORAL at 20:55

## 2023-03-09 RX ADMIN — AMIODARONE HYDROCHLORIDE 400 MG: 200 TABLET ORAL at 09:44

## 2023-03-09 RX ADMIN — METOPROLOL SUCCINATE 25 MG: 25 TABLET, EXTENDED RELEASE ORAL at 09:43

## 2023-03-09 RX ADMIN — AMIODARONE HYDROCHLORIDE 400 MG: 200 TABLET ORAL at 20:55

## 2023-03-09 RX ADMIN — ASPIRIN 81 MG 81 MG: 81 TABLET ORAL at 09:43

## 2023-03-09 RX ADMIN — Medication 10 ML: at 09:47

## 2023-03-09 RX ADMIN — CLOPIDOGREL BISULFATE 600 MG: 300 TABLET, FILM COATED ORAL at 09:43

## 2023-03-09 RX ADMIN — FUROSEMIDE 40 MG: 10 INJECTION, SOLUTION INTRAMUSCULAR; INTRAVENOUS at 09:44

## 2023-03-09 NOTE — PROGRESS NOTES
Progress Note      PCP: No primary care provider on file. Date of Admission: 3/3/2023    Chief Complaint: Palpitations and Shortness of Breath    Hospital Course:   Patient is a 59-year-old male with past medical history of tobacco use disorder (0.5 pack/day) and alcohol usage (3-5 standard drinks daily). Patient presented to [facility], ED on 3/3/2023 with complaints of palpitations, shortness of breath, dyspnea on exertion, fatigue for the last 3 weeks. He denied chest pain, orthopnea, or bilateral LE edema. 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 Vital signs on arrival showed heart rate of 176, BP of 145/110. An EKG showed new onset atrial fibrillation with RVR with heart rate of 177. He did not note a prior history of Afib and endorsed sporadic usage of ASA 81. Troponins were negative x3 and a proBNP was 3971. Chest x-ray showed cardiomegaly, bilateral lung infiltrates, bilateral pleural effusions, likely related to pulmonary edema. CBC and CMP were otherwise unremarkable. Hemoglobin A1c of 5.4%. CHADsVasc Score of 2 noted and patient was started on therapeutic Lovenox, Lasix, diltiazem drip in the ED. Echo showed ejection fraction 15-20% with severe global hypokinesis, mild LVH, and bi-atrial enlargement. Cardiology was consulted on 3/3/2023. They recommended changing patient from diltiazem drip to amiodarone given severe cardiomyopathy and heart failure. They also continued with IV Lasix and plan to pursue SUSI/CV on 3/6/2023. The patient underwent successful SUSI/CV on 3/6/23 with Dr. Kia Iyer. On 3/7/23, pt underwent cath with Dr. Fanny Iyer. Results showed LAD 80% occlusion, Diagonal Branch subtotally occluded, LCX/Large OM1 branch is subtotally occluded. The patient developed hypoxia during the procedure and Pulmonology was consulted. On 3/9/23, pt had a repeat EKG which showed Atrial Fibrillation.  EP planned for repeat CV on 3/10/23 prior to patient undergoing high risk PCI later that morning. Subjective:   Patient seen at bedside this AM. Denies CP, SOB, N/V/D, headaches, lightheadedness/dizziness, or vision changes. Overnight, a repeat EKG was performed that showed Afib again. Endorses sensation of heart palpitations this AM. He is asking about future discharge plans today after PCI tomorrow. Medications:  Reviewed    Infusion Medications    sodium chloride      sodium chloride      sodium chloride       Scheduled Medications    aspirin  81 mg Oral Daily    atorvastatin  40 mg Oral Nightly    clopidogrel  600 mg Oral Once    [START ON 3/10/2023] clopidogrel  75 mg Oral Daily    sodium chloride flush  5-40 mL IntraVENous 2 times per day    furosemide  40 mg IntraVENous TID    sodium chloride flush  5-40 mL IntraVENous 2 times per day    spironolactone  25 mg Oral Daily    amiodarone  400 mg Oral BID    metoprolol succinate  25 mg Oral BID    sodium chloride flush  5-40 mL IntraVENous 2 times per day    [Held by provider] enoxaparin  1 mg/kg (Order-Specific) SubCUTAneous BID    nicotine  1 patch TransDERmal Daily     PRN Meds: sodium chloride flush, sodium chloride, prochlorperazine, sodium chloride flush, sodium chloride, sodium chloride flush, sodium chloride, polyethylene glycol, acetaminophen **OR** acetaminophen, perflutren lipid microspheres      Intake/Output Summary (Last 24 hours) at 3/9/2023 0849  Last data filed at 3/9/2023 0430  Gross per 24 hour   Intake 960 ml   Output 3075 ml   Net -2115 ml     Physical Exam Performed:  BP (!) 122/98   Pulse (!) 110   Temp 98.4 °F (36.9 °C) (Oral)   Resp 16   Ht 6' 2\" (1.88 m)   Wt 198 lb 12.8 oz (90.2 kg)   SpO2 100%   BMI 25.52 kg/m²     General appearance: No apparent distress, appears stated age and cooperative. HEENT: Conjunctivae/corneas clear. Neck:  No jugular venous distention. Respiratory:  Normal respiratory effort.  Clear to auscultation, bilaterally without Rales/Wheezes/Rhonchi. Cardiovascular: Irregular rate and irregular rhythm today without murmurs, rubs or gallops. Abdomen: Soft, non-tender, non-distended   Musculoskeletal: No clubbing, cyanosis or edema bilaterally. Full range of motion without deformity. Skin: Skin color, texture, turgor normal.  No rashes or lesions. Neurologic:  No focal neurological deficit  Psychiatric: Alert and oriented, thought content appropriate, normal insight    Labs:   Recent Labs     03/07/23  0732 03/07/23  1149 03/08/23  0736 03/09/23  0729   WBC 11.0  --  9.9 8.9   HGB 14.1 14.9 13.9 14.6   HCT 42.5  --  42.1 44.2     --  230 264     Recent Labs     03/07/23 0732 03/07/23  1149 03/08/23  0736 03/09/23  0729   *  --  134* 133*   K 3.8  --  3.6 3.6     --  99 99   CO2 21  --  24 20*   BUN 16  --  18 18   CREATININE 1.0 1.1 1.1 1.0   CALCIUM 8.9  --  9.1 9.1       Radiology:  XR CHEST PORTABLE   Final Result   No acute cardiopulmonary findings         XR CHEST PORTABLE   Final Result   1. Cardiomegaly with bilateral lung infiltrates and bilateral pleural   effusions, likely related to pulmonary edema. Superimposed pneumonia cannot   be excluded. Assessment/Plan:  Active Hospital Problems    Diagnosis     Acute respiratory failure with hypoxia ([facility] [J96.01]      Priority: Medium    Acute pulmonary edema (HCC) [J81.0]      Priority: Medium    Mitral regurgitation [I34.0]      Priority: Medium    Biatrial enlargement [I51.7]      Priority: Medium    Pulmonary hypertension ([facility] [I27.20]      Priority: Medium    Overweight (BMI 25.0-29. 9) [E66.3]      Priority: Medium    Current smoker [F17.200]      Priority: Medium    Alcohol use [Z78.9]      Priority: Medium    Acute systolic heart failure (HCC) [I50.21]      Priority: Medium    Cardiomyopathy ([facility] [I42.9]      Priority: Medium    Uncontrolled hypertension [I10]      Priority: Medium    Atrial fibrillation with rapid ventricular response (HCC) [I48.91]      Priority: Medium     New onset A-fib with RVR  - Admit EKG noted A-fib with RVR, . - Troponin x3 negative. TSH 1.41.   - CHADSVASC score 2 (CHF, HTN)  - Likely needs long-term anticoagulation at discharge  - Continue Tele monitoring for now. - Cardiology/EP following. Their recommendations appreciated:    - Continue Amiodarone 400 mg BID PO for now. Plans to taper on 200 mg daily at discharge. - Anticoagulation regimen as below. - Increased Toprol today. - SUSI/CV on 3/6/23 with Dr. Javed Weldon.   - Repeat CV scheduled for morning of 3/10/23. New onset Systolic CHF, HFrEF 28-50%/[id number] Cardiomyopathy  - Admit CXR showed cardiomegaly, bilateral lung infiltrates, bilateral pleural effusions, likely related to pulm edema.  - Admit pro-BNP 3971  - Echo on 3/3/23 showed EF of 15-20% with severe global hypokinesis. - Continue with GDMT below. - Cardiology following. Their recommendations appreciated:   - IV Lasix held today. - Increased Toprol today. Discontinued Losartan. Plans to transition to [facility] at discharge. - Continue Spironolactone 25 mg daily.   - Strict I&Os, daily weights, and fluid restriction.   - Low Sodium Diet. - Cath on 3/7/23 with Dr. Greta Houston. Results in Hospital Course. - Pt developed hypoxia in cath procedure. Pulm consulted and CXR was negative for acute cardiopulmonary processes. Recommended CECILE eval in outpatient. Pulm signed off on 3/8/23. Pt currently on RA at 98%. - Plans for High Risk PCI with Dr. Greta Houston 3/10/23. Requested anticoagulation regimen below:    - Hold Lovenox after evening dose 3/8/23   - Start therapeutic IV Heparin this AM   - Ordered Plavix 600 mg loading dose this AM   - Ordered Plavix 75 mg daily starting tomorrow AM   - Pharmacy consulted about bolus this AM. Their recommendation appreciated. Hypertension  - Admit /110,   - Cardiology following. Their recommendation appreciated. See above.    - IV Lasix held for now.   - Toprol 50 BID, Spironolactone as above. - Likely needs outpatient Pulm/Sleep Med f/u for CECILE eval per Cardiology note. Tobacco Usage  - Chronic 0.5 packs/day smoker since teenager  - Continue Nicoderm 14 mg daily. - Continued to encourage tobacco cessation efforts. Hyperlipidemia   - LDL elevated at 119.   - Pt not on home statin. - Consider starting during this Inpatient stay. DVT Prophylaxis: IV Heparin    Diet: ADULT DIET; Regular; Low Sodium (2 gm)  Diet NPO Exceptions are: Sips of Water with Meds. Code Status: Full Code    PT/OT Eval Status: Not yet ordered    Dispo - Home, pending High Risk PCI intervention. Darylene Saupe, DO  PGY-1, Family Medicine    Addendum to Resident  Progress note:  Pt seen,examined and evaluated with resident and discussed regarding POC . I have reviewed the current history, physical findings, labs and assessment and plan , edited the note where appropriate and agree with note as documented by resident DO ( )     Patient seen and examined this morning. Reports feeling better. Offers no new complaints. S/p LHC on 3/7/2023    FINDINGS  Left main - mild diffuse disease  LAD - 80% diffuse stenosis in the midsegment. Small diagonal branch is subtotally occluded  Left circumflex - large OM1 branch is subtotally occluded with CRISTÓBAL I flow  RCA - large dominant vessel, mild luminal irregularities     LVEDP: 25     -Cardiology plan for staged PCI coming Friday, 3/10/2023 under general anesthesia. NPO after MN today .  Also transitioned to IV heparin gtt from Lovenox BID per Cardiology      Chema Cohen MD  Hospitalist Physician

## 2023-03-09 NOTE — PLAN OF CARE
Problem: Chronic Conditions and Co-morbidities  Goal: Patient's chronic conditions and co-morbidity symptoms are monitored and maintained or improved  Outcome: Progressing      Educated pt on the importance of quitting smoking. Nicotine patch provided per orders.

## 2023-03-09 NOTE — PROGRESS NOTES
[facility], [place]     Electrophysiology                                     Progress Note    Admission date:  3/3/2023    Reason for follow up visit: AF, CHF    HPI/CC: Shira Gunn was admitted on 3/3/2023 with progressive shortness of breath and fatigue. EKG showed rapid AF. Echo showed an EF of 15-20%. Has also been treated for CHF. On 3/6/2023, he had a SUSI and successful cardioversion. On 3/7/2023, he had an LHC that showed severely elevated left sided filling pressures and severe two vessel disease. PCI was not pursued due to hypoxia. He reverted to AF early this morning. Rhythm has been AF with rates in the low 100's. Subjective: He is feeling okay. Denies chest pain, palpitations, shortness of breath, and dizziness. Vitals:  Blood pressure 112/81, pulse (!) 113, temperature 98.4 °F (36.9 °C), temperature source Oral, resp. rate 16, height 6' 2\" (1.88 m), weight 198 lb 12.8 oz (90.2 kg), SpO2 100 %.   Temp  Av.3 °F (36.8 °C)  Min: 98.1 °F (36.7 °C)  Max: 98.4 °F (36.9 °C)  Pulse  Av  Min: 51  Max: 113  BP  Min: 107/84  Max: 122/98  SpO2  Av.4 %  Min: 96 %  Max: 100 %    24 hour I/O    Intake/Output Summary (Last 24 hours) at 3/9/2023 1224  Last data filed at 3/9/2023 1105  Gross per 24 hour   Intake 960 ml   Output 3675 ml   Net -2715 ml       Current Facility-Administered Medications   Medication Dose Route Frequency Provider Last Rate Last Admin    heparin (porcine) injection 4,000 Units  4,000 Units IntraVENous PRN Cholo Mings, DO        heparin (porcine) injection 2,000 Units  2,000 Units IntraVENous PRN Cholo Mings, DO        heparin 25,000 units in dextrose 5% 250 mL (premix) infusion  1,000 Units/hr IntraVENous Continuous Cholo Mings, DO 10 mL/hr at 23 1158 1,000 Units/hr at 23 1158    metoprolol succinate (TOPROL XL) extended release tablet 50 mg  50 mg Oral BID Collette Bowens, APRN - CNP        aspirin chewable tablet 81 mg  81 mg Oral Daily Alize Shanks APRN - CNP   81 mg at 03/09/23 0943    atorvastatin (LIPITOR) tablet 40 mg  40 mg Oral Nightly Alize Shanks APRN - CNP   40 mg at 03/08/23 2012    [START ON 3/10/2023] clopidogrel (PLAVIX) tablet 75 mg  75 mg Oral Daily Morgan Washington MD        0.9 % sodium chloride infusion   IntraVENous PRN Trenton Carballo MD        prochlorperazine (COMPAZINE) tablet 5 mg  5 mg Oral Q6H PRN Ethan Jett DO        [Held by provider] furosemide (LASIX) injection 40 mg  40 mg IntraVENous TID Alize Shanks, APRN - CNP   40 mg at 03/09/23 0944    sodium chloride flush 0.9 % injection 5-40 mL  5-40 mL IntraVENous 2 times per day Sarthak Aguilar MD   10 mL at 03/08/23 7990    sodium chloride flush 0.9 % injection 5-40 mL  5-40 mL IntraVENous PRN Sarthak Aguilar MD        0.9 % sodium chloride infusion   IntraVENous PRN Sarthak Aguilar MD        spironolactone (ALDACTONE) tablet 25 mg  25 mg Oral Daily Shawnee Ahumada APRN - CNP   25 mg at 03/09/23 0944    amiodarone (CORDARONE) tablet 400 mg  400 mg Oral BID Sarthak Aguilar MD   400 mg at 03/09/23 0944    0.9 % sodium chloride infusion   IntraVENous PRN Arsen Bravo MD        polyethylene glycol (GLYCOLAX) packet 17 g  17 g Oral Daily PRN Arsen Bravo MD        acetaminophen (TYLENOL) tablet 650 mg  650 mg Oral Q6H PRN Arsen Bravo MD        Or    acetaminophen (TYLENOL) suppository 650 mg  650 mg Rectal Q6H PRN Arsen Bravo MD        perflutren lipid microspheres (DEFINITY) injection 1.5 mL  1.5 mL IntraVENous ONCE PRN Arsen Bravo MD        nicotine (NICODERM CQ) 14 MG/24HR 1 patch  1 patch TransDERmal Daily Arsen Bravo MD   1 patch at 03/09/23 0945       Objective:     Telemetry monitor: SR    Physical Exam:  Constitutional and general appearance: alert, cooperative, no distress, and appears stated age  HEENT: PERRL, no cervical lymphadenopathy. No masses palpable.  Normal oral mucosa  Respiratory:  Normal excursion and expansion without use of accessory muscles  Resp auscultation: basiliar crackles  Cardiovascular: The apical impulse is not displaced  Heart tones are crisp and normal. Regular S1 and S2.  Jugular venous pulsation Normal  The carotid upstroke is normal in amplitude and contour without delay or bruit  Peripheral pulses are symmetrical and full   Abdomen:  No masses or tenderness  Bowel sounds present  Extremities:   No cyanosis or clubbing   No lower extremity edema   Skin: warm and dry  Neurological:  Alert and oriented  Moves all extremities well  No abnormalities of mood, affect, memory, mentation, or behavior are noted    Data    Echo 3/3/2023:   Technically difficult study due to patient is a smoker and poor acoustic   window. Difficult to assess left ventricular systolic function due to arrhythmia but   appears severely reduced ef 15-20%. Severe global HK with regional variation. Mild concentric left ventricular hypertrophy. Left ventricular diastolic filling pressure is elevated. Mild to moderate mitral regurgitation. Moderate Bi-atrial enlargement. Right ventricular systolic function is mildly reduced . Mild tricuspid regurgitation. Systolic pulmonic artery pressure (SPAP) is estimated at 41 mmHg consistent   with mild pulmonary hypertension (Right atrial pressure of 8 mmHg).      Cath 3/7/2023:  FINAL DIAGNOSIS  Severe two-vessel CAD suggesting at least some component of ischemic cardiomyopathy  Severely elevated left-sided filling pressures suggesting volume overload        PLAN/RECOMMENDATIONS  - Diuresis for high filling pressures, 40 mg IV Lasix bolus given in the Cath Lab  - Place Velazquez or condom catheter  - Stat chest x-ray for hypoxia noted before the procedure requiring high level of supplemental oxygen with nonrebreather throughout the procedure which maintain his oxygen saturation in the 80s  - Consider pulm consult  - Once improved from oxygenation and fluid overload standpoint, high risk multivessel PCI can be planned. Patient will likely need mechanical circulatory support for the procedure       All labs and testing reviewed. Lab Review     Renal Profile:   Lab Results   Component Value Date/Time    CREATININE 1.0 03/09/2023 07:29 AM    BUN 18 03/09/2023 07:29 AM     03/09/2023 07:29 AM    K 3.6 03/09/2023 07:29 AM    CL 99 03/09/2023 07:29 AM    CO2 20 03/09/2023 07:29 AM     CBC:    Lab Results   Component Value Date/Time    WBC 8.9 03/09/2023 07:29 AM    RBC 4.65 03/09/2023 07:29 AM    HGB 14.6 03/09/2023 07:29 AM    HCT 44.2 03/09/2023 07:29 AM    MCV 95.1 03/09/2023 07:29 AM    RDW 14.1 03/09/2023 07:29 AM     03/09/2023 07:29 AM     BNP:  No results found for: BNP  Fasting Lipid Panel:    Lab Results   Component Value Date/Time    CHOL 180 03/03/2023 05:57 AM    HDL 40 03/03/2023 05:57 AM    TRIG 103 03/03/2023 05:57 AM     Cardiac Enzymes:  CK/MbTroponin  Lab Results   Component Value Date/Time    TROPONINI <0.01 03/03/2023 05:14 PM     PT/ INR   Lab Results   Component Value Date/Time    INR 1.10 03/04/2023 07:11 AM    PROTIME 14.1 03/04/2023 07:11 AM     PTT No results found for: PTT   Lab Results   Component Value Date/Time    MG 2.20 03/08/2023 07:36 AM      Lab Results   Component Value Date/Time    TSH 1.41 03/03/2023 05:57 AM       Assessment:  Paroxysmal atrial fibrillation: stable    -[id number] score 3 (HTN, CAD,CHF)    -amiodarone started this admission    -s/p SUSI/CV 3/6/2023 with recurrent AF 3/9/2023  Moderately dilated left atrium   Shortness of breath: recurrent    -hypoxic prior to [facility] 3/7/2023  Mild to moderate MR   Acute systolic CHF: ongoing but improved, CHF team managing    -has diuresed 10 L since admission  Cardiomyopathy: likely mixed   -EF 15-20% on echo 3/2023  Severe multivessel CAD on [facility] 3/7/2023  HTN: controlled   Alcohol and tobacco use         Plan:   1. Continue PO amiodarone. Will discharge on 200 mg PO daily.    2. Continue Heparin  3. Continue Toprol   4. Further diuresis per CHF team. Appreciate input. 5. Smoking cessation is recommended  6. Outpatient evaluation for suspected sleep apnea   7. NPO at midnight. Will plan for repeat CV in the morning prior to staged PCI. 8. Will plan to re-evaluate LVEF after 90 days of optimized GDMT. If EF remains < 35%, will recommend ICD implantation. Plan reviewed with Dr. Alma Solano as well as Dr. Teresita Lai.      BRANT Tyler-CNP  [facility]  [phone]

## 2023-03-09 NOTE — PROGRESS NOTES
Nutrition Assessment     Type and Reason for Visit: Initial, RD Nutrition Re-Screen/LOS, Patient Education    Nutrition Recommendations/Plan:   Continue 2 gm sodium diet   Monitor further diet education needs   Monitor nutrition adequacy, pertinent labs, bowel habits, wt changes, and clinical progress     Malnutrition Assessment:  Malnutrition Status: No malnutrition    Nutrition Assessment:  57 yo male admitted with Afib. S/p heart cath on 3/7 with severe vessel CAD. Plans for high risk PCI tomorrow. Pt nutritionally stable AEB good appetite and PO intakes of % per EMR. Weights trending down this admission r/t fluid loss. Encouraged continued good PO intakes as tolerated. Briefly discussed low sodium diet and importance of diet compliance at home. Handouts left at bedside. Will continue to monitor. Nutrition Related Findings:   -9.3 L per I&Os. BM on 3/7. Active BS. EF 15-20%. Wound Type: None    Current Nutrition Therapies:    ADULT DIET; Regular; Low Sodium (2 gm)  Diet NPO Exceptions are: Sips of Water with Meds    Anthropometric Measures:  Height: 6' 2\" (188 cm)  Current Body Wt: 198 lb (89.8 kg)   BMI: 25.4    Nutrition Diagnosis:   No nutrition diagnosis at this time     Nutrition Interventions:   Food and/or Nutrient Delivery: Continue Current Diet  Nutrition Education/Counseling: Education initiated  Coordination of Nutrition Care: Continue to monitor while inpatient       Nutrition Monitoring and Evaluation:   Behavioral-Environmental Outcomes: Readiness for Change, Knowledge or Skill  Food/Nutrient Intake Outcomes: Food and Nutrient Intake  Physical Signs/Symptoms Outcomes: Fluid Status or Edema, Weight    Nutrition Education:  Educated on CHF  Learners: Patient  Readiness: Acceptance  Method: Explanation and Handout  Response: Verbalizes Understanding  Contact name and number provided.     Discharge Planning:    Continue current diet     Eri Juan MS, RD, LD  Contact: [phone]

## 2023-03-09 NOTE — CONSULTS
Pharmacy to Manage Heparin Infusion per [facility]     Dx: AFib  Pt wt = 90.2 kg   Baseline anti-Xa = 0.23 at 1029      Heparin (weight-based) Infusion: CAD/STEMI/NSTEMI/UA/AFib)   Heparin 60 units/kg IVP bolus (max 4,000 units) followed by Heparin infusion at 12 units/kg/hr (recommended max initial rate: 1000 units/hr). Recheck anti-Xa (unless aPTT being used) in 6 hours. Goal anti-Xa 0.3-0.7 IU/mL  Goal aPTT =  seconds. Pharmacy to manage Heparin - contact for questions. Heparin 12 units/kg/hr (recommended max initial rate 1,000 units/hr). Adjust infusion rate based off anti-Xa results below. anti-Xa < 0.1            Heparin 60 units/kg bolus    Increase infusion by 4 units/kg/hr  anti-Xa 0.1-0.29       Heparin 30 units/kg bolus    Increase infusion by 2 units/kg/hr  anti-Xa 0.3-0.7         No bolus                              No change   anti-Xa 0.71-0.8       No bolus                              Decrease infusion by 1 units/kg/hr  anti-Xa 0.81-0.99     No bolus                              Decrease infusion by 2 units/kg/hr  anti-Xa 1 or more     Hold heparin for 1 hour        Decrease infusion by 3 units/kg/hr     Obtain anti-Xa hours after bolus and 6 hours after any dose change until two consecutive therapeutic anti-Xa are achieved- then daily. Vance Rizo, PharmD 3/9/2023 9:56 AM    3/9/23  1905  Low dose Heparin GTT:  Anti-Xa at 1800 check is 0.3IU/mL which is therapeutic. Continue infusion at 1,000 units/hr and recheck at midnight. Goal range is 0.3-0.7IU/mL.   DEVEN Barber [facility] PharmD 3/9/2023 7:03 PM    ------------------------------  3/10 0021  Low-Dose Heparin Drip  Current Rate: 1000 units/hr  3/9 @ 2339 Anti-Xa Level= 0.22  Plan: Per pharmacy dosing, we will give a bolus dose of 2000 units and increase heparin drip rate to 1180 units/hr    Next Anti-Xa Level: 3/10 @ 1815 [address], PharmD 3/10/2023 12:19 AM    _____________________  3/10/2023 07:09 AM Anti-Xa - 0.21 units/mL  Plan: Will give heparin 2000 unit bolus and increase heparin infusion to 1360 units/hr.    Next anti-Xa today at 15:00    Tremaine Matamoros PharmD, BCPS, BCCCP 3/10/2023 8:39 AM

## 2023-03-09 NOTE — PLAN OF CARE
Patient's EF (Ejection Fraction) is less than 40%    Heart Failure Medications:  Diuretics[de-identified] Furosemide and Spironolactone    (One of the following REQUIRED for EF </= 40%/SYSTOLIC FAILURE but MAY be used in EF% >40%/DIASTOLIC FAILURE)        ACE[de-identified] None        ARB[de-identified] None         ARNI[de-identified] None    (Beta Blockers)  NON- Evidenced Based Beta Blocker (for EF% >40%/DIASTOLIC FAILURE): None    Evidenced Based Beta Blocker::(REQUIRED for EF% <40%/SYSTOLIC FAILURE) Metoprolol SUCCinate- Toprol XL  . .................................................................................................................................................. Patient's weights and intake/output reviewed: Yes    Patient's Last Weight: 198 lbs obtained by standing scale. Difference of 4 lbs less than last documented weight. Intake/Output Summary (Last 24 hours) at 3/9/2023 1021  Last data filed at 3/9/2023 1006  Gross per 24 hour   Intake 960 ml   Output 2775 ml   Net -1815 ml       Daily Weight log at bedside and being used: [facility] Provided: yes    Comorbidities Reviewed Yes    Patient has no past medical history on file. >>For CHF and Comorbidity documentation on Education Time and Topics, please see Education Tab    Progressive Mobility Assessment:  What is this patient's Current Level of Mobility?: Ambulatory-Up Ad Astrid  How was this patient Mobilized today?: Edge of Bed, Up to Chair,  Up to Toilet/Shower, and Up in Room, ambulated 10 ft                 With Whom? Self                 Level of Difficulty/Assistance: Independent     Pt up in chair at this time on room air. Pt denies shortness of breath. Pt without lower extremity edema.      Patient and/or Family's stated Goal of Care this Admission: reduce shortness of breath, increase activity tolerance, better understand heart failure and disease management, be more comfortable, and reduce lower extremity edema prior to discharge        :

## 2023-03-09 NOTE — PLAN OF CARE
In anticipation of coronary revascularization and high risk PCI which would likely require large-bore access for mechanical circulatory support    Enoxaparin held after evening dose on 3/8/23  Please start therapeutic dose IV heparin on morning of 3/9/23  Ordered 600 mg loading dose of Plavix morning of 3/9/23  Ordered 75 mg once daily dose of Plavix starting morning of 3/10/23    Dez Day MD, [place] [address] Cardiology  [address]  [phone] (c)

## 2023-03-09 NOTE — CARE COORDINATION
Spoke with RN who states patient is getting a repeat cath and high risk PCI tomorrow. RN thinks that patient may be able to discharge on Saturday or Sunday. Patient is from home and is normally independent at home. CM will continue to follow should any needs arise.

## 2023-03-09 NOTE — PROGRESS NOTES
NP paged, \"FYI pt flipped back into Afib after being cardioverted on 3/6. Pt rates are controlled, pt is currently on lovenox for [facility] and cardiology is on board. Pt is asymptomatic and vitals are stable. I confirmed with EKG that pt is in Afib but just wanted to make you aware. Thank you! \"

## 2023-03-09 NOTE — PROGRESS NOTES
[facility]   CHF Progress Note    Admit Date:  3/3/2023  HPI:    Chief Complaint   Patient presents with    Shortness of Breath     Started a couple weeks ago, worst the last couple days. Interval history: Norris Winter is being followed for acute CHF. Admitted with worse shortness of breath, EF 15-20%. Subjective:  Mr. Darrin Diaz felt palpitations when he went back into afib. No chest pain. No shortness of breath. Weight is improved.      Objective:   /81   Pulse (!) 113   Temp 98.4 °F (36.9 °C) (Oral)   Resp 16   Ht 6' 2\" (1.88 m)   Wt 198 lb 12.8 oz (90.2 kg)   SpO2 100%   BMI 25.52 kg/m²     Intake/Output Summary (Last 24 hours) at 3/9/2023 0957  Last data filed at 3/9/2023 0430  Gross per 24 hour   Intake 720 ml   Output 2775 ml   Net -2055 ml       NYHA: III    Physical Exam:  General:  Awake, alert, NAD  Skin:  Warm and dry  Neck:  JVD ~ 8-10  Chest:  Clear to auscultation, no wheezes/rhonchi/rales  Telemetry: afib   Cardiovascular:  irregular  S1S2, no m/r/g   Abdomen:  Soft, nontender, +bowel sounds  Extremities:  no  bilateral lower extremity edema    Medications:    heparin (porcine)  60 Units/kg IntraVENous Once    aspirin  81 mg Oral Daily    atorvastatin  40 mg Oral Nightly    [START ON 3/10/2023] clopidogrel  75 mg Oral Daily    sodium chloride flush  5-40 mL IntraVENous 2 times per day    furosemide  40 mg IntraVENous TID    sodium chloride flush  5-40 mL IntraVENous 2 times per day    spironolactone  25 mg Oral Daily    amiodarone  400 mg Oral BID    metoprolol succinate  25 mg Oral BID    sodium chloride flush  5-40 mL IntraVENous 2 times per day    nicotine  1 patch TransDERmal Daily      heparin (PORCINE) Infusion      sodium chloride      sodium chloride      sodium chloride         Lab Data:  CBC:   Recent Labs     03/07/23  0732 03/07/23  1149 03/08/23  0736 03/09/23  0729   WBC 11.0  --  9.9 8.9   HGB 14.1 14.9 13.9 14.6     --  230 264     BMP: Recent Labs     03/07/23  0732 03/07/23  1149 03/08/23  0736 03/09/23  0729   *  --  134* 133*   K 3.8  --  3.6 3.6   CO2 21  --  24 20*   BUN 16  --  18 18   CREATININE 1.0 1.1 1.1 1.0     INR:  No results for input(s): INR in the last 72 hours. BNP:    Recent Labs     03/07/23  0732   PROBNP 3,819*     Lab Results   Component Value Date    LVEF 18 03/03/2023       Testing:    Echo 3/3/2023:   Technically difficult study due to patient is a smoker and poor acoustic   window. Difficult to assess left ventricular systolic function due to arrhythmia but appears severely reduced ef 15-20%. Severe global HK with regional variation. Mild concentric left ventricular hypertrophy. Left ventricular diastolic filling pressure is elevated. Mild to moderate mitral regurgitation. Moderate Bi-atrial enlargement. Right ventricular systolic function is mildly reduced . Mild tricuspid regurgitation. Systolic pulmonic artery pressure (SPAP) is estimated at 41 mmHg consistent with mild pulmonary hypertension (Right atrial pressure of 8 mmHg). [facility] 3/7/2023  FINDINGS  Left main - mild diffuse disease  LAD - 80% diffuse stenosis in the midsegment. Small diagonal branch is subtotally occluded  Left circumflex - large OM1 branch is subtotally occluded with CRISTÓBAL I flow  RCA - large dominant vessel, mild luminal irregularities     LVEDP: 25  FINAL DIAGNOSIS  Severe two-vessel CAD suggesting at least some component of ischemic cardiomyopathy  Severely elevated left-sided filling pressures suggesting volume overload    Principal Problem:    Atrial fibrillation with rapid ventricular response (HCC)  Active Problems:    Acute systolic heart failure (HCC)    Cardiomyopathy (HCC)    Uncontrolled hypertension    Acute respiratory failure with hypoxia (HCC)    Acute pulmonary edema (HCC)    Mitral regurgitation    Biatrial enlargement    Pulmonary hypertension (HCC)    Overweight (BMI 25.0-29. 9)    Current smoker Alcohol use  Resolved Problems:    * No resolved hospital problems. *     03/09/23 0430 198 lb 12.8 oz (90.2 kg) Actual;Standing scale     03/08/23 0448 202 lb 12.8 oz (92 kg) Actual;Standing scale     03/07/23 0553 207 lb 9.6 oz (94.2 kg) Standing scale     03/06/23 0626 210 lb (95.3 kg) Actual;Standing scale     03/05/23 0455 209 lb 9.6 oz (95.1 kg) Actual;Standing scale     03/04/23 0458 211 lb (95.7 kg) Standing scale     03/03/23 1530 235 lb (106.6 kg) --     03/03/23 0908 235 lb (106.6 kg) --         Assessment:  Acute HFrEF  Biventricular heart failure  Cardiomyopathy LVEF 15-20%; likely mixed ishcemic and nonischemic   CAD - s/p [facility] 3/7/2023 with severe 2 vessel CAD   Afib- EP following  HTN  Hx of alcohol abuse      Plan:  Daily weights  Daily BMP  Adjust lasix to once today and then re-evaluate dose after procedure pending weights and renal function   Off  losartan to allow for more diuresis at this time and then will plan to start entresto prior to discharge   Continue Spironolactone (aldactone)   Adjust  toprol to 50 mg BID  Amiodarone per EP   aspirin and statin  Plavix has been started  Starting heparin infusion     Plan for high risk stage PCI tomorrow  with anesthesia and mechanical support per DR. Karena Tam     Recommend outpatient CECILE evaluation     Core measures for HFrEF (EF <40%):  EF: 15-20%   ICD: NA needs GDMT titration 1st   ACEi/ARB/ARNI: entresto at discharge  BB:toprol   Elie: 25 mg Spironolactone (aldactone)  SGLT2: to be added     Education provided today Disease process and medications discussed. Questions answered fully. Encouraged daily monitoring of the patient's weight. Total Time spent educating today 15 minutes     Discussed procedure planned for tomorrow, keep npo after midnight.      BRANT Grullon - CNP,  3/9/2023, 10:03 AM

## 2023-03-10 ENCOUNTER — ANESTHESIA (OUTPATIENT)
Dept: CARDIAC CATH/INVASIVE PROCEDURES | Age: 63
End: 2023-03-10
Payer: COMMERCIAL

## 2023-03-10 ENCOUNTER — APPOINTMENT (OUTPATIENT)
Dept: GENERAL RADIOLOGY | Age: 63
DRG: 215 | End: 2023-03-10
Payer: COMMERCIAL

## 2023-03-10 ENCOUNTER — ANESTHESIA EVENT (OUTPATIENT)
Dept: CARDIAC CATH/INVASIVE PROCEDURES | Age: 63
End: 2023-03-10
Payer: COMMERCIAL

## 2023-03-10 ENCOUNTER — APPOINTMENT (OUTPATIENT)
Dept: CARDIAC CATH/INVASIVE PROCEDURES | Age: 63
DRG: 215 | End: 2023-03-10
Payer: COMMERCIAL

## 2023-03-10 LAB
ABO/RH: NORMAL
ALBUMIN SERPL-MCNC: 3.4 G/DL (ref 3.4–5)
ANION GAP SERPL CALCULATED.3IONS-SCNC: 10 MMOL/L (ref 3–16)
ANION GAP SERPL CALCULATED.3IONS-SCNC: 13 MMOL/L (ref 3–16)
ANTI-XA UNFRAC HEPARIN: 0.21 IU/ML (ref 0.3–0.7)
ANTI-XA UNFRAC HEPARIN: 0.22 IU/ML (ref 0.3–0.7)
ANTI-XA UNFRAC HEPARIN: 0.76 IU/ML (ref 0.3–0.7)
ANTI-XA UNFRAC HEPARIN: >1.1 IU/ML (ref 0.3–0.7)
ANTIBODY SCREEN: NORMAL
APTT: 119.5 SEC (ref 23–34.3)
APTT: >180 SEC (ref 23–34.3)
BASE EXCESS VENOUS: -1.9 MMOL/L (ref -3–3)
BASOPHILS ABSOLUTE: 0 K/UL (ref 0–0.2)
BASOPHILS RELATIVE PERCENT: 0.4 %
BUN BLDV-MCNC: 18 MG/DL (ref 7–20)
BUN BLDV-MCNC: 19 MG/DL (ref 7–20)
CALCIUM SERPL-MCNC: 8.5 MG/DL (ref 8.3–10.6)
CALCIUM SERPL-MCNC: 9.4 MG/DL (ref 8.3–10.6)
CARBOXYHEMOGLOBIN: 1.3 % (ref 0–1.5)
CHLORIDE BLD-SCNC: 100 MMOL/L (ref 99–110)
CHLORIDE BLD-SCNC: 102 MMOL/L (ref 99–110)
CO2: 23 MMOL/L (ref 21–32)
CO2: 23 MMOL/L (ref 21–32)
CREAT SERPL-MCNC: 0.8 MG/DL (ref 0.8–1.3)
CREAT SERPL-MCNC: 0.9 MG/DL (ref 0.8–1.3)
EKG ATRIAL RATE: 55 BPM
EKG DIAGNOSIS: NORMAL
EKG P AXIS: 55 DEGREES
EKG P-R INTERVAL: 178 MS
EKG Q-T INTERVAL: 500 MS
EKG QRS DURATION: 102 MS
EKG QTC CALCULATION (BAZETT): 478 MS
EKG R AXIS: 3 DEGREES
EKG T AXIS: 80 DEGREES
EKG VENTRICULAR RATE: 55 BPM
EOSINOPHILS ABSOLUTE: 0.1 K/UL (ref 0–0.6)
EOSINOPHILS RELATIVE PERCENT: 1.3 %
GFR SERPL CREATININE-BSD FRML MDRD: >60 ML/MIN/{1.73_M2}
GFR SERPL CREATININE-BSD FRML MDRD: >60 ML/MIN/{1.73_M2}
GLUCOSE BLD-MCNC: 93 MG/DL (ref 70–99)
GLUCOSE BLD-MCNC: 97 MG/DL (ref 70–99)
HCO3 VENOUS: 23 MMOL/L (ref 23–29)
HCT VFR BLD CALC: 39.2 % (ref 40.5–52.5)
HCT VFR BLD CALC: 44 % (ref 40.5–52.5)
HEMOGLOBIN: 13.1 G/DL (ref 13.5–17.5)
HEMOGLOBIN: 14.6 G/DL (ref 13.5–17.5)
INR BLD: 1.27 (ref 0.87–1.14)
LYMPHOCYTES ABSOLUTE: 1.2 K/UL (ref 1–5.1)
LYMPHOCYTES RELATIVE PERCENT: 15.9 %
MAGNESIUM: 2.1 MG/DL (ref 1.8–2.4)
MCH RBC QN AUTO: 31 PG (ref 26–34)
MCH RBC QN AUTO: 31.3 PG (ref 26–34)
MCHC RBC AUTO-ENTMCNC: 33.2 G/DL (ref 31–36)
MCHC RBC AUTO-ENTMCNC: 33.5 G/DL (ref 31–36)
MCV RBC AUTO: 93.2 FL (ref 80–100)
MCV RBC AUTO: 93.3 FL (ref 80–100)
METHEMOGLOBIN VENOUS: 0.4 %
MONOCYTES ABSOLUTE: 0.8 K/UL (ref 0–1.3)
MONOCYTES RELATIVE PERCENT: 10.2 %
NEUTROPHILS ABSOLUTE: 5.5 K/UL (ref 1.7–7.7)
NEUTROPHILS RELATIVE PERCENT: 72.2 %
O2 SAT, VEN: 98 %
O2 THERAPY: ABNORMAL
PCO2, VEN: 40 MMHG (ref 40–50)
PDW BLD-RTO: 13.7 % (ref 12.4–15.4)
PDW BLD-RTO: 14.3 % (ref 12.4–15.4)
PH VENOUS: 7.38 (ref 7.35–7.45)
PHOSPHORUS: 4.4 MG/DL (ref 2.5–4.9)
PLATELET # BLD: 262 K/UL (ref 135–450)
PLATELET # BLD: 296 K/UL (ref 135–450)
PMV BLD AUTO: 8 FL (ref 5–10.5)
PMV BLD AUTO: 8.3 FL (ref 5–10.5)
PO2, VEN: 138.8 MMHG (ref 25–40)
POC ACT LR: 240 SEC
POC ACT LR: 269 SEC
POC ACT LR: 282 SEC
POC ACT LR: 296 SEC
POC ACT LR: 310 SEC
POTASSIUM REFLEX MAGNESIUM: 3.8 MMOL/L (ref 3.5–5.1)
POTASSIUM SERPL-SCNC: 4 MMOL/L (ref 3.5–5.1)
PROTHROMBIN TIME: 15.8 SEC (ref 11.7–14.5)
RBC # BLD: 4.2 M/UL (ref 4.2–5.9)
RBC # BLD: 4.72 M/UL (ref 4.2–5.9)
SODIUM BLD-SCNC: 135 MMOL/L (ref 136–145)
SODIUM BLD-SCNC: 136 MMOL/L (ref 136–145)
TCO2 CALC VENOUS: 24 MMOL/L
WBC # BLD: 6.2 K/UL (ref 4–11)
WBC # BLD: 7.6 K/UL (ref 4–11)

## 2023-03-10 PROCEDURE — 6360000002 HC RX W HCPCS: Performed by: ANESTHESIOLOGY

## 2023-03-10 PROCEDURE — 3700000000 HC ANESTHESIA ATTENDED CARE

## 2023-03-10 PROCEDURE — 92960 CARDIOVERSION ELECTRIC EXT: CPT | Performed by: INTERNAL MEDICINE

## 2023-03-10 PROCEDURE — 83735 ASSAY OF MAGNESIUM: CPT

## 2023-03-10 PROCEDURE — 85520 HEPARIN ASSAY: CPT

## 2023-03-10 PROCEDURE — 85610 PROTHROMBIN TIME: CPT

## 2023-03-10 PROCEDURE — 93454 CORONARY ARTERY ANGIO S&I: CPT

## 2023-03-10 PROCEDURE — 85730 THROMBOPLASTIN TIME PARTIAL: CPT

## 2023-03-10 PROCEDURE — 6360000002 HC RX W HCPCS: Performed by: EMERGENCY MEDICINE

## 2023-03-10 PROCEDURE — 85347 COAGULATION TIME ACTIVATED: CPT

## 2023-03-10 PROCEDURE — 2580000003 HC RX 258: Performed by: NURSE PRACTITIONER

## 2023-03-10 PROCEDURE — 2720000010 HC SURG SUPPLY STERILE

## 2023-03-10 PROCEDURE — 02PA3RZ REMOVAL OF SHORT-TERM EXTERNAL HEART ASSIST SYSTEM FROM HEART, PERCUTANEOUS APPROACH: ICD-10-PCS | Performed by: INTERNAL MEDICINE

## 2023-03-10 PROCEDURE — 2709999900 HC NON-CHARGEABLE SUPPLY

## 2023-03-10 PROCEDURE — 6370000000 HC RX 637 (ALT 250 FOR IP): Performed by: ANESTHESIOLOGY

## 2023-03-10 PROCEDURE — 02HA3RZ INSERTION OF SHORT-TERM EXTERNAL HEART ASSIST SYSTEM INTO HEART, PERCUTANEOUS APPROACH: ICD-10-PCS | Performed by: INTERNAL MEDICINE

## 2023-03-10 PROCEDURE — 6370000000 HC RX 637 (ALT 250 FOR IP): Performed by: NURSE PRACTITIONER

## 2023-03-10 PROCEDURE — 85025 COMPLETE CBC W/AUTO DIFF WBC: CPT

## 2023-03-10 PROCEDURE — 3700000001 HC ADD 15 MINUTES (ANESTHESIA)

## 2023-03-10 PROCEDURE — 6360000002 HC RX W HCPCS

## 2023-03-10 PROCEDURE — 0BH18EZ INSERTION OF ENDOTRACHEAL AIRWAY INTO TRACHEA, VIA NATURAL OR ARTIFICIAL OPENING ENDOSCOPIC: ICD-10-PCS | Performed by: ANESTHESIOLOGY

## 2023-03-10 PROCEDURE — 6360000002 HC RX W HCPCS: Performed by: INTERNAL MEDICINE

## 2023-03-10 PROCEDURE — 92928 PRQ TCAT PLMT NTRAC ST 1 LES: CPT

## 2023-03-10 PROCEDURE — 2500000003 HC RX 250 WO HCPCS

## 2023-03-10 PROCEDURE — 2580000003 HC RX 258

## 2023-03-10 PROCEDURE — 80069 RENAL FUNCTION PANEL: CPT

## 2023-03-10 PROCEDURE — 93010 ELECTROCARDIOGRAM REPORT: CPT | Performed by: INTERNAL MEDICINE

## 2023-03-10 PROCEDURE — 6370000000 HC RX 637 (ALT 250 FOR IP): Performed by: INTERNAL MEDICINE

## 2023-03-10 PROCEDURE — 2580000003 HC RX 258: Performed by: EMERGENCY MEDICINE

## 2023-03-10 PROCEDURE — 86850 RBC ANTIBODY SCREEN: CPT

## 2023-03-10 PROCEDURE — 94761 N-INVAS EAR/PLS OXIMETRY MLT: CPT

## 2023-03-10 PROCEDURE — 92921 HC PRQ CARDIAC ANGIO ADDL ART: CPT

## 2023-03-10 PROCEDURE — 92978 ENDOLUMINL IVUS OCT C 1ST: CPT

## 2023-03-10 PROCEDURE — 86901 BLOOD TYPING SEROLOGIC RH(D): CPT

## 2023-03-10 PROCEDURE — C1874 STENT, COATED/COV W/DEL SYS: HCPCS

## 2023-03-10 PROCEDURE — 7100000000 HC PACU RECOVERY - FIRST 15 MIN

## 2023-03-10 PROCEDURE — 6360000004 HC RX CONTRAST MEDICATION

## 2023-03-10 PROCEDURE — C1753 CATH, INTRAVAS ULTRASOUND: HCPCS

## 2023-03-10 PROCEDURE — 5A1945Z RESPIRATORY VENTILATION, 24-96 CONSECUTIVE HOURS: ICD-10-PCS | Performed by: ANESTHESIOLOGY

## 2023-03-10 PROCEDURE — 93571 IV DOP VEL&/PRESS C FLO 1ST: CPT

## 2023-03-10 PROCEDURE — 92960 CARDIOVERSION ELECTRIC EXT: CPT

## 2023-03-10 PROCEDURE — 5A0221D ASSISTANCE WITH CARDIAC OUTPUT USING IMPELLER PUMP, CONTINUOUS: ICD-10-PCS | Performed by: INTERNAL MEDICINE

## 2023-03-10 PROCEDURE — 36415 COLL VENOUS BLD VENIPUNCTURE: CPT

## 2023-03-10 PROCEDURE — C1887 CATHETER, GUIDING: HCPCS

## 2023-03-10 PROCEDURE — C1769 GUIDE WIRE: HCPCS

## 2023-03-10 PROCEDURE — 2700000000 HC OXYGEN THERAPY PER DAY

## 2023-03-10 PROCEDURE — 2580000003 HC RX 258: Performed by: INTERNAL MEDICINE

## 2023-03-10 PROCEDURE — 71045 X-RAY EXAM CHEST 1 VIEW: CPT

## 2023-03-10 PROCEDURE — 94002 VENT MGMT INPAT INIT DAY: CPT

## 2023-03-10 PROCEDURE — 2500000003 HC RX 250 WO HCPCS: Performed by: ANESTHESIOLOGY

## 2023-03-10 PROCEDURE — 02703ZZ DILATION OF CORONARY ARTERY, ONE ARTERY, PERCUTANEOUS APPROACH: ICD-10-PCS | Performed by: INTERNAL MEDICINE

## 2023-03-10 PROCEDURE — 7100000001 HC PACU RECOVERY - ADDTL 15 MIN

## 2023-03-10 PROCEDURE — 33990 INSJ PERQ VAD L HRT ARTERIAL: CPT

## 2023-03-10 PROCEDURE — 74018 RADEX ABDOMEN 1 VIEW: CPT

## 2023-03-10 PROCEDURE — 2000000000 HC ICU R&B

## 2023-03-10 PROCEDURE — 93005 ELECTROCARDIOGRAM TRACING: CPT | Performed by: INTERNAL MEDICINE

## 2023-03-10 PROCEDURE — C1894 INTRO/SHEATH, NON-LASER: HCPCS

## 2023-03-10 PROCEDURE — C1725 CATH, TRANSLUMIN NON-LASER: HCPCS

## 2023-03-10 PROCEDURE — 37220 HC ILIAC TERRITORY PLASTY: CPT

## 2023-03-10 PROCEDURE — 86900 BLOOD TYPING SEROLOGIC ABO: CPT

## 2023-03-10 PROCEDURE — 85027 COMPLETE CBC AUTOMATED: CPT

## 2023-03-10 PROCEDURE — 82803 BLOOD GASES ANY COMBINATION: CPT

## 2023-03-10 RX ORDER — HEPARIN SODIUM 1000 [USP'U]/ML
60 INJECTION, SOLUTION INTRAVENOUS; SUBCUTANEOUS ONCE
Status: DISCONTINUED | OUTPATIENT
Start: 2023-03-10 | End: 2023-03-10 | Stop reason: SDUPTHER

## 2023-03-10 RX ORDER — HEPARIN SODIUM 1000 [USP'U]/ML
2000 INJECTION, SOLUTION INTRAVENOUS; SUBCUTANEOUS ONCE
Status: COMPLETED | OUTPATIENT
Start: 2023-03-10 | End: 2023-03-10

## 2023-03-10 RX ORDER — ACETAMINOPHEN 325 MG/1
650 TABLET ORAL EVERY 4 HOURS PRN
Status: DISCONTINUED | OUTPATIENT
Start: 2023-03-10 | End: 2023-03-14 | Stop reason: HOSPADM

## 2023-03-10 RX ORDER — HEPARIN SODIUM 1000 [USP'U]/ML
INJECTION, SOLUTION INTRAVENOUS; SUBCUTANEOUS
Status: COMPLETED | OUTPATIENT
Start: 2023-03-10 | End: 2023-03-10

## 2023-03-10 RX ORDER — HEPARIN SODIUM 1000 [USP'U]/ML
30 INJECTION, SOLUTION INTRAVENOUS; SUBCUTANEOUS PRN
Status: DISCONTINUED | OUTPATIENT
Start: 2023-03-10 | End: 2023-03-10 | Stop reason: SDUPTHER

## 2023-03-10 RX ORDER — DOPAMINE HYDROCHLORIDE 160 MG/100ML
5 INJECTION, SOLUTION INTRAVENOUS CONTINUOUS
Status: DISCONTINUED | OUTPATIENT
Start: 2023-03-10 | End: 2023-03-12

## 2023-03-10 RX ORDER — MIDAZOLAM HYDROCHLORIDE 1 MG/ML
INJECTION INTRAMUSCULAR; INTRAVENOUS PRN
Status: DISCONTINUED | OUTPATIENT
Start: 2023-03-10 | End: 2023-03-10 | Stop reason: SDUPTHER

## 2023-03-10 RX ORDER — FENTANYL CITRATE 50 UG/ML
25 INJECTION, SOLUTION INTRAMUSCULAR; INTRAVENOUS
Status: DISCONTINUED | OUTPATIENT
Start: 2023-03-10 | End: 2023-03-12

## 2023-03-10 RX ORDER — HEPARIN SODIUM 10000 [USP'U]/100ML
1360 INJECTION, SOLUTION INTRAVENOUS CONTINUOUS
Status: DISCONTINUED | OUTPATIENT
Start: 2023-03-10 | End: 2023-03-10

## 2023-03-10 RX ORDER — PROPOFOL 10 MG/ML
5-80 INJECTION, EMULSION INTRAVENOUS CONTINUOUS
Status: DISCONTINUED | OUTPATIENT
Start: 2023-03-10 | End: 2023-03-12

## 2023-03-10 RX ORDER — EPHEDRINE SULFATE 50 MG/ML
INJECTION INTRAVENOUS PRN
Status: DISCONTINUED | OUTPATIENT
Start: 2023-03-10 | End: 2023-03-10 | Stop reason: SDUPTHER

## 2023-03-10 RX ORDER — FENTANYL CITRATE 50 UG/ML
INJECTION, SOLUTION INTRAMUSCULAR; INTRAVENOUS PRN
Status: DISCONTINUED | OUTPATIENT
Start: 2023-03-10 | End: 2023-03-10 | Stop reason: SDUPTHER

## 2023-03-10 RX ORDER — PROPOFOL 10 MG/ML
INJECTION, EMULSION INTRAVENOUS PRN
Status: DISCONTINUED | OUTPATIENT
Start: 2023-03-10 | End: 2023-03-10 | Stop reason: SDUPTHER

## 2023-03-10 RX ORDER — SODIUM CHLORIDE 9 MG/ML
INJECTION, SOLUTION INTRAVENOUS PRN
Status: DISCONTINUED | OUTPATIENT
Start: 2023-03-10 | End: 2023-03-14 | Stop reason: HOSPADM

## 2023-03-10 RX ORDER — HEPARIN SODIUM 1000 [USP'U]/ML
60 INJECTION, SOLUTION INTRAVENOUS; SUBCUTANEOUS PRN
Status: DISCONTINUED | OUTPATIENT
Start: 2023-03-10 | End: 2023-03-10 | Stop reason: SDUPTHER

## 2023-03-10 RX ORDER — SODIUM CHLORIDE 0.9 % (FLUSH) 0.9 %
5-40 SYRINGE (ML) INJECTION PRN
Status: DISCONTINUED | OUTPATIENT
Start: 2023-03-10 | End: 2023-03-14 | Stop reason: HOSPADM

## 2023-03-10 RX ORDER — HEPARIN SODIUM 10000 [USP'U]/100ML
5-30 INJECTION, SOLUTION INTRAVENOUS CONTINUOUS
Status: DISCONTINUED | OUTPATIENT
Start: 2023-03-10 | End: 2023-03-10 | Stop reason: SDUPTHER

## 2023-03-10 RX ORDER — KETAMINE HCL IN NACL, ISO-OSM 100MG/10ML
SYRINGE (ML) INJECTION PRN
Status: DISCONTINUED | OUTPATIENT
Start: 2023-03-10 | End: 2023-03-10 | Stop reason: SDUPTHER

## 2023-03-10 RX ORDER — PHENYLEPHRINE HCL IN 0.9% NACL 1 MG/10 ML
SYRINGE (ML) INTRAVENOUS PRN
Status: DISCONTINUED | OUTPATIENT
Start: 2023-03-10 | End: 2023-03-10 | Stop reason: SDUPTHER

## 2023-03-10 RX ORDER — LIDOCAINE HYDROCHLORIDE 20 MG/ML
INJECTION, SOLUTION EPIDURAL; INFILTRATION; INTRACAUDAL; PERINEURAL PRN
Status: DISCONTINUED | OUTPATIENT
Start: 2023-03-10 | End: 2023-03-10 | Stop reason: SDUPTHER

## 2023-03-10 RX ORDER — ROCURONIUM BROMIDE 10 MG/ML
INJECTION, SOLUTION INTRAVENOUS PRN
Status: DISCONTINUED | OUTPATIENT
Start: 2023-03-10 | End: 2023-03-10 | Stop reason: SDUPTHER

## 2023-03-10 RX ORDER — SODIUM CHLORIDE 0.9 % (FLUSH) 0.9 %
5-40 SYRINGE (ML) INJECTION EVERY 12 HOURS SCHEDULED
Status: DISCONTINUED | OUTPATIENT
Start: 2023-03-10 | End: 2023-03-14 | Stop reason: HOSPADM

## 2023-03-10 RX ORDER — FAMOTIDINE 10 MG/ML
20 INJECTION, SOLUTION INTRAVENOUS 2 TIMES DAILY
Status: DISCONTINUED | OUTPATIENT
Start: 2023-03-10 | End: 2023-03-12

## 2023-03-10 RX ORDER — CHLORHEXIDINE GLUCONATE 0.12 MG/ML
15 RINSE ORAL 2 TIMES DAILY
Status: DISCONTINUED | OUTPATIENT
Start: 2023-03-10 | End: 2023-03-12

## 2023-03-10 RX ADMIN — PROPOFOL 75 MCG/KG/MIN: 10 INJECTION, EMULSION INTRAVENOUS at 17:33

## 2023-03-10 RX ADMIN — HEPARIN SODIUM 1360 UNITS/HR: 10000 INJECTION, SOLUTION INTRAVENOUS at 11:11

## 2023-03-10 RX ADMIN — ATORVASTATIN CALCIUM 40 MG: 40 TABLET, FILM COATED ORAL at 19:39

## 2023-03-10 RX ADMIN — PROPOFOL 75 MCG/KG/MIN: 10 INJECTION, EMULSION INTRAVENOUS at 16:40

## 2023-03-10 RX ADMIN — HEPARIN SODIUM 2000 UNITS: 1000 INJECTION INTRAVENOUS; SUBCUTANEOUS at 01:09

## 2023-03-10 RX ADMIN — LIDOCAINE HYDROCHLORIDE 50 MG: 20 INJECTION, SOLUTION EPIDURAL; INFILTRATION; INTRACAUDAL; PERINEURAL at 12:40

## 2023-03-10 RX ADMIN — Medication 100 MCG: at 13:33

## 2023-03-10 RX ADMIN — Medication 25 MG: at 12:40

## 2023-03-10 RX ADMIN — HEPARIN SODIUM 4000 UNITS: 1000 INJECTION, SOLUTION INTRAVENOUS; SUBCUTANEOUS at 13:05

## 2023-03-10 RX ADMIN — Medication 50 MCG: at 13:09

## 2023-03-10 RX ADMIN — EPHEDRINE SULFATE 10 MG: 50 INJECTION INTRAVENOUS at 13:38

## 2023-03-10 RX ADMIN — HEPARIN SODIUM 4000 UNITS: 1000 INJECTION, SOLUTION INTRAVENOUS; SUBCUTANEOUS at 14:08

## 2023-03-10 RX ADMIN — ASPIRIN 81 MG 81 MG: 81 TABLET ORAL at 08:30

## 2023-03-10 RX ADMIN — Medication 5 MG: at 12:51

## 2023-03-10 RX ADMIN — SPIRONOLACTONE 25 MG: 25 TABLET, FILM COATED ORAL at 08:30

## 2023-03-10 RX ADMIN — ROCURONIUM BROMIDE 50 MG: 10 SOLUTION INTRAVENOUS at 15:45

## 2023-03-10 RX ADMIN — Medication 15 ML: at 19:39

## 2023-03-10 RX ADMIN — FENTANYL CITRATE 25 MCG: 50 INJECTION INTRAMUSCULAR; INTRAVENOUS at 12:47

## 2023-03-10 RX ADMIN — PROPOFOL 45 MCG/KG/MIN: 10 INJECTION, EMULSION INTRAVENOUS at 19:56

## 2023-03-10 RX ADMIN — HEPARIN SODIUM 4000 UNITS: 1000 INJECTION, SOLUTION INTRAVENOUS; SUBCUTANEOUS at 13:45

## 2023-03-10 RX ADMIN — DOPAMINE HYDROCHLORIDE 5 MCG/KG/MIN: 160 INJECTION, SOLUTION INTRAVENOUS at 16:33

## 2023-03-10 RX ADMIN — METOPROLOL SUCCINATE 50 MG: 50 TABLET, EXTENDED RELEASE ORAL at 19:38

## 2023-03-10 RX ADMIN — SODIUM CHLORIDE: 9 INJECTION, SOLUTION INTRAVENOUS at 12:35

## 2023-03-10 RX ADMIN — AMIODARONE HYDROCHLORIDE 400 MG: 200 TABLET ORAL at 19:39

## 2023-03-10 RX ADMIN — PROPOFOL 100 MCG/KG/MIN: 10 INJECTION, EMULSION INTRAVENOUS at 12:41

## 2023-03-10 RX ADMIN — Medication 10 MG: at 14:11

## 2023-03-10 RX ADMIN — HEPARIN SODIUM 4000 UNITS: 1000 INJECTION, SOLUTION INTRAVENOUS; SUBCUTANEOUS at 14:23

## 2023-03-10 RX ADMIN — PROPOFOL 55 MCG/KG/MIN: 10 INJECTION, EMULSION INTRAVENOUS at 23:53

## 2023-03-10 RX ADMIN — HEPARIN SODIUM 11.4 UNITS: 1000 INJECTION INTRAVENOUS; SUBCUTANEOUS at 18:26

## 2023-03-10 RX ADMIN — PROPOFOL 30 MG: 10 INJECTION, EMULSION INTRAVENOUS at 12:40

## 2023-03-10 RX ADMIN — AMIODARONE HYDROCHLORIDE 400 MG: 200 TABLET ORAL at 08:30

## 2023-03-10 RX ADMIN — FAMOTIDINE 20 MG: 10 INJECTION, SOLUTION INTRAVENOUS at 19:38

## 2023-03-10 RX ADMIN — HEPARIN SODIUM 3000 UNITS: 1000 INJECTION, SOLUTION INTRAVENOUS; SUBCUTANEOUS at 14:58

## 2023-03-10 RX ADMIN — HEPARIN SODIUM 2000 UNITS: 1000 INJECTION INTRAVENOUS; SUBCUTANEOUS at 10:54

## 2023-03-10 RX ADMIN — Medication 10 ML: at 19:39

## 2023-03-10 RX ADMIN — FENTANYL CITRATE 25 MCG: 50 INJECTION INTRAMUSCULAR; INTRAVENOUS at 12:40

## 2023-03-10 RX ADMIN — Medication 10 ML: at 08:31

## 2023-03-10 RX ADMIN — METOPROLOL SUCCINATE 50 MG: 50 TABLET, EXTENDED RELEASE ORAL at 08:30

## 2023-03-10 RX ADMIN — CLOPIDOGREL BISULFATE 75 MG: 75 TABLET ORAL at 08:31

## 2023-03-10 RX ADMIN — MIDAZOLAM HYDROCHLORIDE 2 MG: 2 INJECTION, SOLUTION INTRAMUSCULAR; INTRAVENOUS at 12:35

## 2023-03-10 ASSESSMENT — PULMONARY FUNCTION TESTS
PIF_VALUE: 17
PIF_VALUE: 18
PIF_VALUE: 19
PIF_VALUE: 18
PIF_VALUE: 16
PIF_VALUE: 18
PIF_VALUE: 17
PIF_VALUE: 14
PIF_VALUE: 18
PIF_VALUE: 18
PIF_VALUE: 16
PIF_VALUE: 16
PIF_VALUE: 18
PIF_VALUE: 18
PIF_VALUE: 9
PIF_VALUE: 18
PIF_VALUE: 16
PIF_VALUE: 17

## 2023-03-10 ASSESSMENT — LIFESTYLE VARIABLES: SMOKING_STATUS: 1

## 2023-03-10 ASSESSMENT — PAIN SCALES - GENERAL: PAINLEVEL_OUTOF10: 0

## 2023-03-10 ASSESSMENT — PAIN SCALES - WONG BAKER: WONGBAKER_NUMERICALRESPONSE: 0

## 2023-03-10 NOTE — PLAN OF CARE
Patient's EF (Ejection Fraction) is less than 40%    Heart Failure Medications:  Diuretics[de-identified] Spironolactone    (One of the following REQUIRED for EF </= 40%/SYSTOLIC FAILURE but MAY be used in EF% >40%/DIASTOLIC FAILURE)        ACE[de-identified] None        ARB[de-identified] None         ARNI[de-identified] None- to be started at d/c    (Beta Blockers)  NON- Evidenced Based Beta Blocker (for EF% >40%/DIASTOLIC FAILURE): None    Evidenced Based Beta Blocker::(REQUIRED for EF% <40%/SYSTOLIC FAILURE) Metoprolol SUCCinate- Toprol XL  . .................................................................................................................................................. Patient's weights and intake/output reviewed: Yes    Patient's Last Weight: 199 lbs obtained by standing scale. Difference of 1 lbs more than last documented weight. Intake/Output Summary (Last 24 hours) at 3/10/2023 1005  Last data filed at 3/10/2023 0913  Gross per 24 hour   Intake 1200 ml   Output 2150 ml   Net -950 ml       Daily Weight log at bedside and being used: [employer] Provided: yes    Comorbidities Reviewed Yes    Patient has no past medical history on file. >>For CHF and Comorbidity documentation on Education Time and Topics, please see Education Tab    Progressive Mobility Assessment:  What is this patient's Current Level of Mobility?: Ambulatory-Up Ad Astrid  How was this patient Mobilized today?: Edge of Bed, Up to Chair,  Up to Toilet/Shower, and Up in Room, ambulated 15 ft                 With Whom? Self                 Level of Difficulty/Assistance: Independent     Pt resting in bed at this time on room air. Pt denies shortness of breath. Pt without lower extremity edema.      Patient and/or Family's stated Goal of Care this Admission: increase activity tolerance, better understand heart failure and disease management, and be more comfortable prior to discharge        :

## 2023-03-10 NOTE — ANESTHESIA POSTPROCEDURE EVALUATION
Department of Anesthesiology  Postprocedure Note    Patient: Jay Negro  MRN: [id number]  YOB: 1960  Date of evaluation: 3/10/2023      Procedure Summary     Date: 03/10/23 Room / Location: [facility] Cardiac Cath Lab    Anesthesia Start: 1225 Anesthesia Stop: 2873    Procedure: PTCA/STENT Diagnosis:     Scheduled Providers:  Responsible Provider: Brandon Madsen MD    Anesthesia Type: MAC ASA Status: 4          Anesthesia Type: No value filed. Triston Phase I: Triston Score: 4    Triston Phase II:        Anesthesia Post Evaluation    Patient location during evaluation: PACU  Patient participation: complete - patient cannot participate  Level of consciousness: sedated and ventilated  Airway patency: patent  Nausea & Vomiting: no nausea and no vomiting  Complications: no  Cardiovascular status: blood pressure returned to baseline, bradycardic and vasoactive/inotropes  Respiratory status: acceptable  Hydration status: euvolemic  Comments: Pt in PACU intubated, ventilated and sedated to remain still with impella placed overnight. VSS on transfer to phase 2 recovery. No anesthetic complications.

## 2023-03-10 NOTE — PROGRESS NOTES
03/10/23 1619   Patient Observation   Heart Rate (!) 40   SpO2 100 %   Vent Information   Vent Mode AC/VC   $Ventilation $Initial Day   Ventilator Settings   FiO2  100 %   Vt (Set, mL) 600 mL   Resp Rate (Set) 14 bmp   PEEP/CPAP (cmH2O) 5   Vent Patient Data (Readings)   Vt (Measured) 617 mL   Peak Inspiratory Pressure (cmH2O) 16 cmH2O   Rate Measured 14 br/min   Minute Volume (L/min) 8.63 Liters   Mean Airway Pressure (cmH2O) 8.5 cmH20   I:E Ratio 1:2.3   Backup Apnea On   Vent Alarm Settings   High Pressure (cmH2O) 40 cmH2O   Low Minute Volume (lpm) 200 L/min   Low Exhaled Vt (ml) 200 mL   RR High (bpm) 40 br/min   Apnea (secs) 20 secs   Additional Respiratoray Assessments   Humidification Source HME   Ambu Bag With Mask At Bedside Yes

## 2023-03-10 NOTE — PLAN OF CARE
Problem: Chronic Conditions and Co-morbidities  Goal: Patient's chronic conditions and co-morbidity symptoms are monitored and maintained or improved  Outcome: Progressing   Pt educate on the importance of smoking cessation. Pt provided with nicotine patch while admitted.

## 2023-03-10 NOTE — PROGRESS NOTES
Pt belongings arrived at bedside from prior floor. Suitcase with 2 pairs of jeans, 2 shirts, 2 pairs of shoes and socks. 1 laptop and . 2 wallets- both withOUT cash and multiple credit cards. Electric toothbrush and hairbrush.

## 2023-03-10 NOTE — PROGRESS NOTES
Fem stop applied with minimal pressure. Doppler pulse in PT but not able to get DP pulse. Dr. Karena Tam satisfied. Foot pink,cool toes. Right femoral site with dry dressing in place. Warm blankets applied again. Temp 96.8 with temporal thermometer. Patient remains sedated with Propofol at 75 mcg/kg/min.

## 2023-03-10 NOTE — PROGRESS NOTES
Report called to Raúl Beal RN in ICU per [facility] RN from Cath lab. Continue to hold direct pressure at right femoral site. Dr. Fanny Iyer at bedside to check on patient. [employer] rep also at bedside consulting. Patient remains sedated with Propofol at 75 mcg/kg/min. Tolerating Dopamine off. Occasional episodes of bigeminy-self limiting. Warm blankets applied.

## 2023-03-10 NOTE — PLAN OF CARE
Patient is planned for complex PCI with mechanical circulatory support under general anesthesia which will be managed by anesthesia service. I went over the pertinent details of the planned procedure with Mr. Colton Faustin 1 more time today and went over the risks and benefits. I shared that the procedure carries a approximately 10% risk of a major adverse event including cardiogenic shock, acute MI, hemorrhagic shock, death. He continues to have very good understanding of the risks and the details of the procedure and wants to pursue percutaneous revascularization of his multivessel CAD. We will proceed with procedure with [facility] and general anesthesia support.     Marlen Fernandez MD, [facility]  Interventional Cardiology  [facility] [address]  [phone] (c)

## 2023-03-10 NOTE — ANESTHESIA PRE PROCEDURE
Department of Anesthesiology  Preprocedure Note       Name:  Kelley Cadena   Age:  58 y.o.  :  1960                                          MRN:  [id number]         Date:  3/10/2023      Surgeon: * No surgeons listed *    Procedure: * No procedures listed *    Medications prior to admission:   Prior to Admission medications    Not on File       Current medications:    Current Facility-Administered Medications   Medication Dose Route Frequency Provider Last Rate Last Admin    sodium chloride flush 0.9 % injection 5-40 mL  5-40 mL IntraVENous 2 times per day Jose Human, APRN - CNP        sodium chloride flush 0.9 % injection 5-40 mL  5-40 mL IntraVENous PRN Jose Human, APRN - CNP        0.9 % sodium chloride infusion   IntraVENous PRN Jose Human, APRN - CNP        heparin (porcine) injection 2,000 Units  2,000 Units IntraVENous Once Nataly Gutierrez MD        heparin (porcine) injection 4,000 Units  4,000 Units IntraVENous PRN Arnoldo Shallow, DO        heparin (porcine) injection 2,000 Units  2,000 Units IntraVENous PRN Arnoldo Shallow, DO        heparin 25,000 units in dextrose 5% 250 mL (premix) infusion  1,360 Units/hr IntraVENous Continuous Nataly Gutierrez MD 11.8 mL/hr at 03/10/23 0107 1,180 Units/hr at 03/10/23 0107    metoprolol succinate (TOPROL XL) extended release tablet 50 mg  50 mg Oral BID Jose Human, APRN - CNP   50 mg at 03/10/23 0830    aspirin chewable tablet 81 mg  81 mg Oral Daily Jose Human, APRN - CNP   81 mg at 03/10/23 0830    atorvastatin (LIPITOR) tablet 40 mg  40 mg Oral Nightly Jose Human, APRN - CNP   40 mg at 23    clopidogrel (PLAVIX) tablet 75 mg  75 mg Oral Daily Cynthia Bai MD   75 mg at 03/10/23 0831    prochlorperazine (COMPAZINE) tablet 5 mg  5 mg Oral Q6H PRN Arnoldo Shallow, DO        [Held by provider] furosemide (LASIX) injection 40 mg  40 mg IntraVENous TID Jose Human, APRN - CNP 40 mg at 03/09/23 0944    spironolactone (ALDACTONE) tablet 25 mg  25 mg Oral Daily Shawnee Ahumada APRN - CNP   25 mg at 03/10/23 0830    amiodarone (CORDARONE) tablet 400 mg  400 mg Oral BID Staci Eisenmenger, MD   400 mg at 03/10/23 0830    polyethylene glycol (GLYCOLAX) packet 17 g  17 g Oral Daily PRN Román Uriarte MD        acetaminophen (TYLENOL) tablet 650 mg  650 mg Oral Q6H PRN Román Uriarte MD        Or    acetaminophen (TYLENOL) suppository 650 mg  650 mg Rectal Q6H PRN Román Uriarte MD        perflutren lipid microspheres (DEFINITY) injection 1.5 mL  1.5 mL IntraVENous ONCE PRN Román Uriarte MD        nicotine (NICODERM CQ) 14 MG/24HR 1 patch  1 patch TransDERmal Daily Román Uriarte MD   1 patch at 03/09/23 0945       Allergies:  No Known Allergies    Problem List:    Patient Active Problem List   Diagnosis Code    Atrial fibrillation with rapid ventricular response (HCC) F31.88    Acute systolic heart failure (HCC) I50.21    Cardiomyopathy ([facility] [address] I42.9    Uncontrolled hypertension I10    Acute respiratory failure with hypoxia ([facility] [address] J96.01    Acute pulmonary edema (HCC) J81.0    Mitral regurgitation I34.0    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 9) E66.3    Current smoker F17.200    Alcohol use Z78.9       Past Medical History:  History reviewed. No pertinent past medical history. Past Surgical History:  History reviewed. No pertinent surgical history.     Social History:    Social History     Tobacco Use    Smoking status: Every Day     Types: Cigarettes    Smokeless tobacco: Never   Substance Use Topics    Alcohol use: Yes     Comment: daily                                Ready to quit: Not Answered  Counseling given: Not Answered      Vital Signs (Current):   Vitals:    03/10/23 0022 03/10/23 0344 03/10/23 0537 03/10/23 0755   BP: 109/86 123/87  117/85   Pulse: 99 100  (!) 122   Resp: 16 16  18   Temp: 97.7 °F (36.5 °C) 98.3 °F (36.8 °C)  97.9 °F (36.6 °C)   TempSrc: Oral Oral  Oral   SpO2: 100% 96%  97%   Weight:   199 lb 1.6 oz (90.3 kg)    Height:                                                  BP Readings from Last 3 Encounters:   03/10/23 117/85       NPO Status:                                                                                 BMI:   Wt Readings from Last 3 Encounters:   03/10/23 199 lb 1.6 oz (90.3 kg)     Body mass index is 25.56 kg/m².     CBC:   Lab Results   Component Value Date/Time    WBC 7.6 03/10/2023 07:09 AM    RBC 4.72 03/10/2023 07:09 AM    HGB 14.6 03/10/2023 07:09 AM    HCT 44.0 03/10/2023 07:09 AM    MCV 93.2 03/10/2023 07:09 AM    RDW 13.7 03/10/2023 07:09 AM     03/10/2023 07:09 AM       CMP:   Lab Results   Component Value Date/Time     03/10/2023 07:09 AM    K 3.8 03/10/2023 07:09 AM     03/10/2023 07:09 AM    CO2 23 03/10/2023 07:09 AM    BUN 19 03/10/2023 07:09 AM    CREATININE 0.9 03/10/2023 07:09 AM    AGRATIO 1.4 03/03/2023 05:57 AM    LABGLOM >60 03/10/2023 07:09 AM    GLUCOSE 97 03/10/2023 07:09 AM    PROT 7.2 03/03/2023 05:57 AM    CALCIUM 9.4 03/10/2023 07:09 AM    BILITOT 0.5 03/03/2023 05:57 AM    ALKPHOS 98 03/03/2023 05:57 AM    AST 31 03/03/2023 05:57 AM    ALT 27 03/03/2023 05:57 AM       POC Tests:   Recent Labs     03/07/23  1149   POCGLU 87   POCNA 140   POCK 3.7   POCCL 108   POCHCT 44.0       Coags:   Lab Results   Component Value Date/Time    PROTIME 14.1 03/04/2023 07:11 AM    INR 1.10 03/04/2023 07:11 AM       HCG (If Applicable): No results found for: PREGTESTUR, PREGSERUM, HCG, HCGQUANT     ABGs:   Lab Results   Component Value Date/Time    PHART 7.447 03/07/2023 11:49 AM    PO2ART 213.3 03/07/2023 11:49 AM    JJM6JDV 30.6 03/07/2023 11:49 AM    AEH7POJ 21.1 03/07/2023 11:49 AM    BEART -3 03/07/2023 11:49 AM    T9NTVSRM 100 03/07/2023 11:49 AM        Type & Screen (If Applicable):  No results found for: LABABO, LABRH    Drug/Infectious Status (If Applicable):  No results found for: HIV, HEPCAB    COVID-19 Screening (If Applicable): No results found for: COVID19        Anesthesia Evaluation  Patient summary reviewed and Nursing notes reviewed  Airway: Mallampati: II          Dental: normal exam         Pulmonary:normal exam    (+) current smoker                           Cardiovascular:    (+) hypertension:, valvular problems/murmurs: MR, dysrhythmias: atrial fibrillation, CHF (LVEF 20%):, pulmonary hypertension:,         Rhythm: irregular  Rate: abnormal                    Neuro/Psych:   Negative Neuro/Psych ROS              GI/Hepatic/Renal: Neg GI/Hepatic/Renal ROS            Endo/Other: Negative Endo/Other ROS                    Abdominal:             Vascular: negative vascular ROS. Other Findings:           Anesthesia Plan      MAC     ASA 4       Induction: intravenous. Anesthetic plan and risks discussed with patient. Plan discussed with CRNA.     Attending anesthesiologist reviewed and agrees with Preprocedure content                EDWIN Rozanna Hodgkin, MD   3/10/2023

## 2023-03-10 NOTE — PROCEDURES
ELECTROPHYSIOLOGY REPORT    : Jacquelyn Melgar MD    COMPLICATIONS:  none    ESTIMATED LOSS OF BLOOD: None    ANESTHESIA: MAC    HISTORY:  58 y.o. with severe LV systolic dysfunction and recurrent atrial fibrillation. Presents for electrical cardioversion. SUSI done earlier this admission. DETAILS OF PROCEDURE:      The patient was in the pre-post area  in good condition. The patient was in a fasting, nonsedated state. He was hemodynamically stable. The risks, benefits and alternatives of the procedure were discussed with the patient and family in detail. The risks including, but not limited to, the risks of stroke, asystole, skin burns were discussed with the patient. The patient considered his options and opted to proceed with external electrical cardioversion as planned. Written informed consent was obtained and placed on the chart. At this time, a timeout protocol was completed to identify the patient and the procedure being performed. The patient was attached to continuous electrocardiographic monitoring with noninvasive blood pressure monitoring as well. Anterior and posterior chest defibrillation pads were attached in the typical position. Once adequate sedation was confirmed, a 150 joule synchronized biphasic external shock was delivered and failed to convert the patient to normal sinus rhythm. A second 200 Joule synchronized shock also failed to restore sinus rhythm. Finally, a 360 joule synchronized biphasic external shock was delivered and successfully cardioverted the patient to normal sinus rhythmThe patient maintained adequate oxygenation throughout the procedure. He remained hemodynamically stable. Within a few minutes of the successful cardioversion, the patient started to wake up and had no evidence of any neurologic sequelae. He continued to recover and remained hemodynamically stable with adequate vital signs and oxygen saturation.   At the end of the procedure, the patient was monitored in the Pre-Post area until he recovers back to baseline. SUMMARY:    1. Successful external electrical cardioversion. RECOMMENDATIONS:    1.  Bed rest x 1 hour.   2.  Continue telemetry monitoring while in the hospital.

## 2023-03-10 NOTE — PROGRESS NOTES
Site redressed at Right femoral groin region. Placed Fem stop per Dr. Raven Bryan. Dr. Raven Bryan at bedside. Doppler pulse right posterior tibial artery.

## 2023-03-10 NOTE — PROGRESS NOTES
Transported patient to the PACU. Hans Epstein RN is managing vent and recovery. I am managing Impella. Stable. Right femoral arterial dressing was changed due to slow blood ooze from site. Current dressing is clean and dry.

## 2023-03-10 NOTE — PROGRESS NOTES
Right groin oozing - Ugo holding manual pressure. Noted frequent PVCs. Blood pressure improved-Dopamine paused. Nelson Mora

## 2023-03-10 NOTE — ANESTHESIA PRE PROCEDURE
Department of Anesthesiology  Preprocedure Note       Name:  Abraham Mckinnon   Age:  62 y.o.  :  1960                                          MRN:  [id number]         Date:  3/10/2023      Surgeon: * No surgeons listed *    Procedure: * No procedures listed *    Medications prior to admission:   Prior to Admission medications    Not on File       Current medications:    Current Facility-Administered Medications   Medication Dose Route Frequency Provider Last Rate Last Admin   • sodium chloride flush 0.9 % injection 5-40 mL  5-40 mL IntraVENous 2 times per day BRANT Maria - CNP       • sodium chloride flush 0.9 % injection 5-40 mL  5-40 mL IntraVENous PRN Diana Spain APRN - CNP       • 0.9 % sodium chloride infusion   IntraVENous PRN BRANT Maria - CNP   New Bag at 03/10/23 1235   • heparin 25,000 unit in sodium chloride 0.45% 250 mL (premix) infusion  1,360 Units/hr IntraVENous Continuous Yefri Benitez MD   Stopped at 03/10/23 1227   • chlorhexidine (PERIDEX) 0.12 % solution 15 mL  15 mL Mouth/Throat BID Alejandro Howard MD       • famotidine (PEPCID) injection 20 mg  20 mg IntraVENous BID Alejandro Howard MD       • propofol injection  5-80 mcg/kg/min IntraVENous Continuous Alejandro Howard MD 40.6 mL/hr at 03/10/23 1643 75 mcg/kg/min at 03/10/23 1643   • fentaNYL (SUBLIMAZE) injection 25 mcg  25 mcg IntraVENous Q1H PRN Alejandro Howard MD       • DOPamine (INTROPIN) 400 mg in dextrose 5 % 250 mL infusion  5 mcg/kg/min IntraVENous Continuous Se Junior MD 8.4 mL/hr at 03/10/23 1653 2.481 mcg/kg/min at 03/10/23 1653   • heparin (porcine) injection 4,000 Units  4,000 Units IntraVENous PRN Iam Chapman,        • heparin (porcine) injection 2,000 Units  2,000 Units IntraVENous PRN Iam Chapman,        • metoprolol succinate (TOPROL XL) extended release tablet 50 mg  50 mg Oral BID BRANT Maria - CNP   50 mg at  03/10/23 0830    aspirin chewable tablet 81 mg  81 mg Oral Daily BRANT Dallas CNP   81 mg at 03/10/23 0830    atorvastatin (LIPITOR) tablet 40 mg  40 mg Oral Nightly BRANT Dallas - CNP   40 mg at 03/09/23 2055    clopidogrel (PLAVIX) tablet 75 mg  75 mg Oral Daily Reddy Davis MD   75 mg at 03/10/23 0831    prochlorperazine (COMPAZINE) tablet 5 mg  5 mg Oral Q6H PRN Alexia Mann DO        [Held by provider] furosemide (LASIX) injection 40 mg  40 mg IntraVENous TID BRANT Dallas CNP   40 mg at 03/09/23 0944    spironolactone (ALDACTONE) tablet 25 mg  25 mg Oral Daily BRANT Benoit - CNP   25 mg at 03/10/23 0830    amiodarone (CORDARONE) tablet 400 mg  400 mg Oral BID Teresita Tinajero MD   400 mg at 03/10/23 0830    polyethylene glycol (GLYCOLAX) packet 17 g  17 g Oral Daily PRN Jeremiah Hoffman MD        acetaminophen (TYLENOL) tablet 650 mg  650 mg Oral Q6H PRN Jeremiah Hoffman MD        Or    acetaminophen (TYLENOL) suppository 650 mg  650 mg Rectal Q6H PRN Jeremiah Hoffman MD        perflutren lipid microspheres (DEFINITY) injection 1.5 mL  1.5 mL IntraVENous ONCE PRN Jeremiah Hoffman MD        nicotine (NICODERM CQ) 14 MG/24HR 1 patch  1 patch TransDERmal Daily Jeremiah Hoffman MD   1 patch at 03/09/23 0945       Allergies:  No Known Allergies    Problem List:    Patient Active Problem List   Diagnosis Code    Atrial fibrillation with rapid ventricular response (HCC) H94.80    Acute systolic heart failure (HCC) I50.21    Cardiomyopathy ([facility] [address] I42.9    Uncontrolled hypertension I10    Acute respiratory failure with hypoxia (HCC) J96.01    Acute pulmonary edema (HCC) J81.0    Mitral regurgitation I34.0    Biatrial enlargement I51.7    Pulmonary hypertension (HCC) I27.20    Overweight (BMI 25.0-29. 9) E66.3    Current smoker F17.200    Alcohol use Z78.9       Past Medical History:  History reviewed. No pertinent past medical history.     Past Surgical History:  History reviewed. No pertinent surgical history. Social History:    Social History     Tobacco Use    Smoking status: Every Day     Types: Cigarettes    Smokeless tobacco: Never   Substance Use Topics    Alcohol use: Yes     Comment: daily                                Ready to quit: Not Answered  Counseling given: Not Answered      Vital Signs (Current):   Vitals:    03/10/23 1604 03/10/23 1605 03/10/23 1610 03/10/23 1619   BP:  100/73 98/74    Pulse: (!) 42 (!) 42 (!) 41 (!) 40   Resp:  14     Temp:       TempSrc:       SpO2: 99% 99% 99% 100%   Weight:       Height:                                                  BP Readings from Last 3 Encounters:   03/10/23 98/74       NPO Status:                                                                                 BMI:   Wt Readings from Last 3 Encounters:   03/10/23 199 lb 1.6 oz (90.3 kg)     Body mass index is 25.56 kg/m². CBC:   Lab Results   Component Value Date/Time    WBC 6.2 03/10/2023 03:38 PM    RBC 4.20 03/10/2023 03:38 PM    HGB 13.1 03/10/2023 03:38 PM    HCT 39.2 03/10/2023 03:38 PM    MCV 93.3 03/10/2023 03:38 PM    RDW 14.3 03/10/2023 03:38 PM     03/10/2023 03:38 PM       CMP:   Lab Results   Component Value Date/Time     03/10/2023 03:38 PM    K 4.0 03/10/2023 03:38 PM    K 3.8 03/10/2023 07:09 AM     03/10/2023 03:38 PM    CO2 23 03/10/2023 03:38 PM    BUN 18 03/10/2023 03:38 PM    CREATININE 0.8 03/10/2023 03:38 PM    AGRATIO 1.4 03/03/2023 05:57 AM    LABGLOM >60 03/10/2023 03:38 PM    GLUCOSE 93 03/10/2023 03:38 PM    PROT 7.2 03/03/2023 05:57 AM    CALCIUM 8.5 03/10/2023 03:38 PM    BILITOT 0.5 03/03/2023 05:57 AM    ALKPHOS 98 03/03/2023 05:57 AM    AST 31 03/03/2023 05:57 AM    ALT 27 03/03/2023 05:57 AM       POC Tests: No results for input(s): POCGLU, POCNA, POCK, POCCL, POCBUN, POCHEMO, POCHCT in the last 72 hours.     Coags:   Lab Results   Component Value Date/Time    PROTIME 14.1 03/04/2023 07:11 AM    INR 1.10 03/04/2023 07:11 AM       HCG (If Applicable): No results found for: PREGTESTUR, PREGSERUM, HCG, HCGQUANT     ABGs:   Lab Results   Component Value Date/Time    PHART 7.447 03/07/2023 11:49 AM    PO2ART 213.3 03/07/2023 11:49 AM    FLN9ECE 30.6 03/07/2023 11:49 AM    LNX7SMM 21.1 03/07/2023 11:49 AM    BEART -3 03/07/2023 11:49 AM    C1MQFDBK 100 03/07/2023 11:49 AM        Type & Screen (If Applicable):  No results found for: LABABO, LABRH    Drug/Infectious Status (If Applicable):  No results found for: HIV, HEPCAB    COVID-19 Screening (If Applicable): No results found for: COVID19        Anesthesia Evaluation    Airway: Mallampati: II  TM distance: >3 FB   Neck ROM: full  Mouth opening: > = 3 FB   Dental:          Pulmonary:                              Cardiovascular:                      Neuro/Psych:               GI/Hepatic/Renal:             Endo/Other:                     Abdominal:             Vascular: Other Findings:           Anesthesia Plan      MAC     ASA 4     (Risks, benefits and alternatives of MAC anesthesia/GA as needed discussed with pt by DR Escalera on the complete pre-op note that is entered in Epic. Questions answered. Willing to proceed.)  Induction: intravenous. Anesthetic plan and risks discussed with patient.                         Shayne Davidson MD   3/10/2023

## 2023-03-10 NOTE — PLAN OF CARE
Problem: Safety - Adult  Goal: Free from fall injury  Outcome: Progressing  Note: Pt will remain free from falls throughout hospital stay. Fall precautions in place, bed in lowest position with wheels locked and side rails 2/4 up. Room door open and hourly rounding completed. Will continue to monitor throughout shift.

## 2023-03-10 NOTE — ANESTHESIA POSTPROCEDURE EVALUATION
Department of Anesthesiology  Postprocedure Note    Patient: Abilio Ritchie  MRN: [id number]  YOB: 1960  Date of evaluation: 3/10/2023      Procedure Summary     Date: 03/10/23 Room / Location: [facility] Cardiac Cath Lab    Anesthesia Start: [address] Anesthesia Stop: 1499    Procedure: CARDIOVERSION Diagnosis:     Scheduled Providers:  Responsible Provider: Annette Bryan MD    Anesthesia Type: MAC ASA Status: 4          Anesthesia Type: No value filed. Triston Phase I: Triston Score: 4    Triston Phase II:        Anesthesia Post Evaluation    Patient location during evaluation: PACU  Patient participation: complete - patient participated  Level of consciousness: awake and alert  Airway patency: patent  Nausea & Vomiting: no nausea and no vomiting  Complications: no  Cardiovascular status: blood pressure returned to baseline  Respiratory status: acceptable  Hydration status: euvolemic  Comments: VSS on transfer to phase 2 recovery. No anesthetic complications.

## 2023-03-10 NOTE — PROGRESS NOTES
03/10/23 1624   ETT    Placement Date: 03/10/23   Present on Admission/Arrival: No  Placed By: Licensed provider; In surgery  Placement Verified By: Auscultation  Preoxygenation: Yes  Airway Type: Cuffed  Airway Tube Size: 7.5 mm  Location: Oral  Secured At: 24 cm  Measured . ..    Secured At 24 cm   Measured From Lips   ETT Placement Center   Secured By Commercial tube maier   Site Assessment Dry   Tie/Maier Changed Yes

## 2023-03-10 NOTE — PROGRESS NOTES
Progress Note      PCP: No primary care provider on file.    Date of Admission: 3/3/2023    Chief Complaint: Palpitations and Shortness of Breath    Hospital Course:   Patient is a 62-year-old male with past medical history of tobacco use disorder (0.5 pack/day) and alcohol usage (3-5 standard drinks daily).  Patient presented to [facility] ED on 3/3/2023 with complaints of palpitations, shortness of breath, dyspnea on exertion, fatigue for the last 3 weeks.  He denied chest pain, orthopnea, or bilateral LE edema. 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  Vital signs on arrival showed heart rate of 176, BP of 145/110.  An EKG showed new onset atrial fibrillation with RVR with heart rate of 177.  He did not note a prior history of Afib and endorsed sporadic usage of ASA 81. Troponins were negative x3 and a proBNP was 3971.  Chest x-ray showed cardiomegaly, bilateral lung infiltrates, bilateral pleural effusions, likely related to pulmonary edema.  CBC and CMP were otherwise unremarkable.  Hemoglobin A1c of 5.4%. CHADsVasc Score of 2 noted and patient was started on therapeutic Lovenox, Lasix, diltiazem drip in the ED.  Echo showed ejection fraction 15-20% with severe global hypokinesis, mild LVH, and bi-atrial enlargement.  Cardiology was consulted on 3/3/2023.  They recommended changing patient from diltiazem drip to amiodarone given severe cardiomyopathy and heart failure. The patient underwent SUSI/CV on 3/6/23 with Dr. Zhang with initial restoration of SR. On 3/7/23, pt underwent cath with Dr. Junior. Results showed LAD 80% occlusion, Diagonal Branch subtotally occluded, LCX/Large OM1 branch is subtotally occluded. The patient developed hypoxia during the procedure and Pulmonology was consulted. On 3/9/23, pt had a repeat EKG which showed Atrial Fibrillation. EP planned for repeat CV on 3/10/23 prior to patient undergoing high risk PCI later that morning.     Subjective:  Patient seen at bedside this AM. Denies CP, SOB, N/V/D, headaches, lightheadedness/dizziness, vision changes, hemoptysis, hematochezia, or melena. He continues to be in Afib but denies acute complaints this morning. No acute events reported overnight. He is anticipating CV and PCI this morning. Medications:  Reviewed    Infusion Medications    sodium chloride      heparin (PORCINE) Infusion 1,180 Units/hr (03/10/23 0107)     Scheduled Medications    sodium chloride flush  5-40 mL IntraVENous 2 times per day    heparin (porcine)  2,000 Units IntraVENous Once    metoprolol succinate  50 mg Oral BID    aspirin  81 mg Oral Daily    atorvastatin  40 mg Oral Nightly    clopidogrel  75 mg Oral Daily    [Held by provider] furosemide  40 mg IntraVENous TID    spironolactone  25 mg Oral Daily    amiodarone  400 mg Oral BID    nicotine  1 patch TransDERmal Daily     PRN Meds: sodium chloride flush, sodium chloride, heparin (porcine), heparin (porcine), prochlorperazine, polyethylene glycol, acetaminophen **OR** acetaminophen, perflutren lipid microspheres      Intake/Output Summary (Last 24 hours) at 3/10/2023 0857  Last data filed at 3/10/2023 0835  Gross per 24 hour   Intake 1200 ml   Output 2150 ml   Net -950 ml     Physical Exam Performed:  /85   Pulse (!) 122   Temp 97.9 °F (36.6 °C) (Oral)   Resp 18   Ht 6' 2\" (1.88 m)   Wt 199 lb 1.6 oz (90.3 kg)   SpO2 97%   BMI 25.56 kg/m²     General appearance: No apparent distress, appears stated age and cooperative. HEENT: Conjunctivae/corneas clear. Neck:  No jugular venous distention. Respiratory:  Normal respiratory effort. Clear to auscultation, bilaterally without Rales/Wheezes/Rhonchi. Cardiovascular: Irregular rate and irregular rhythm today without murmurs, rubs or gallops. Abdomen: Soft, non-tender, non-distended   Musculoskeletal: No clubbing, cyanosis or edema bilaterally. Full range of motion without deformity.   Skin: Skin color, texture, turgor normal.  No rashes or lesions. Neurologic:  No focal neurological deficit  Psychiatric: Alert and oriented, thought content appropriate, normal insight    Labs:   Recent Labs     03/08/23  0736 03/09/23  0729 03/10/23  0709   WBC 9.9 8.9 7.6   HGB 13.9 14.6 14.6   HCT 42.1 44.2 44.0    264 296     Recent Labs     03/08/23  0736 03/09/23  0729 03/10/23  0709   * 133* 136   K 3.6 3.6 3.8   CL 99 99 100   CO2 24 20* 23   BUN 18 18 19   CREATININE 1.1 1.0 0.9   CALCIUM 9.1 9.1 9.4       Radiology:  XR CHEST PORTABLE   Final Result   No acute cardiopulmonary findings         XR CHEST PORTABLE   Final Result   1. Cardiomegaly with bilateral lung infiltrates and bilateral pleural   effusions, likely related to pulmonary edema. Superimposed pneumonia cannot   be excluded. Assessment/Plan:  Active Hospital Problems    Diagnosis     Acute respiratory failure with hypoxia ([facility] [J96.01]      Priority: Medium    Acute pulmonary edema (HCC) [J81.0]      Priority: Medium    Mitral regurgitation [I34.0]      Priority: Medium    Biatrial enlargement [I51.7]      Priority: Medium    Pulmonary hypertension ([facility] [I27.20]      Priority: Medium    Overweight (BMI 25.0-29. 9) [E66.3]      Priority: Medium    Current smoker [F17.200]      Priority: Medium    Alcohol use [Z78.9]      Priority: Medium    Acute systolic heart failure (HCC) [I50.21]      Priority: Medium    Cardiomyopathy ([facility] [I42.9]      Priority: Medium    Uncontrolled hypertension [I10]      Priority: Medium    Atrial fibrillation with rapid ventricular response (HCC) [I48.91]      Priority: Medium     New onset A-fib with RVR  - Admit EKG noted A-fib with RVR, . - Troponin x3 negative. TSH 1.41.   - CHADSVASC score 2 (CHF, HTN)  - Likely needs long-term anticoagulation at discharge  - Continue Tele monitoring for now. - Cardiology/EP following. Their recommendations appreciated:    - Continue Amiodarone 400 mg BID PO for now. Plans to taper on 200 mg daily at discharge. - Anticoagulation regimen as below. - Increased Toprol yesterday. - SUSI/CV on 3/6/23 with Dr. Patricia Laguna.   - Repeat CV scheduled for morning of 3/10/23. New onset Systolic CHF, HFrEF 24-91%/SPIJTZES Cardiomyopathy  - Admit CXR showed cardiomegaly, bilateral lung infiltrates, bilateral pleural effusions, likely related to pulm edema.  - Admit pro-BNP 3971  - Echo on 3/3/23 showed EF of 15-20% with severe global hypokinesis. - Continue with GDMT below. - Cardiology following. Their recommendations appreciated:   - IV Lasix held today. - Increased Toprol yesterday. Discontinued Losartan. Plans to transition to [facility] at discharge. - Continue Spironolactone 25 mg daily.   - Strict I&Os, daily weights, and fluid restriction.   - Low Sodium Diet. - Cath on 3/7/23 with Dr. Orlando Carrizales. Results below:  -  Left main - mild diffuse disease  - LAD - 80% diffuse stenosis in the midsegment. Small diagonal branch is subtotally occluded  - LCX - large OM1 branch is subtotally occluded with CRISTÓBAL I flow  - RCA - large dominant vessel, mild luminal irregularities  - LVEDP: 25  - Pt developed hypoxia in cath procedure on 3/7/23. Pulm consulted and CXR was negative for acute cardiopulmonary processes. Recommended CECILE eval in outpatient. Pulm signed off on 3/8/23. Pt currently on RA at 97%. - Plans for High Risk PCI with Dr. Orlando Carrizales 3/10/23. Requested anticoagulation regimen below:    - Held Lovenox after evening dose 3/8/23   - Start therapeutic IV Heparin in AM of 3/9/23   - Ordered Plavix 600 mg loading dose in AM of 3/9/23   - Ordered Plavix 75 mg daily starting in AM of 3/10/23   - Pharmacy consulted about IV Heparin. Their recommendation appreciated. Hypertension  - Admit /110,   - Cardiology following. Their recommendation appreciated. See above. - IV Lasix held for now. - Toprol 50 BID, Spironolactone as above.    - Likely needs outpatient Pulm/Sleep Med f/u for CECILE eval per Cardiology note. Tobacco Usage  - Chronic 0.5 packs/day smoker since teenager  - Continue Nicoderm 14 mg daily. - Continued to encourage tobacco cessation efforts. Hyperlipidemia   - LDL elevated at 119.   - Pt not on home statin. - Consider starting during this Inpatient stay. DVT Prophylaxis: IV Heparin    Diet: Diet NPO Exceptions are: Sips of Water with Meds. Code Status: Full Code    PT/OT Eval Status: Not yet ordered    Dispo - Home, pending High Risk PCI intervention. Matheus Nicolas DO  PGY-1, Family Medicine    . Addendum to Resident  Progress note:  Pt seen,examined and evaluated with resident and discussed regarding POC . I have reviewed the current history, physical findings, labs and assessment and plan , edited the note where appropriate and agree with note as documented by resident DO ( )     Patient seen and examined this morning. Reports feeling better. Offers no new complaints. S/p LHC on 3/7/2023    FINDINGS  Left main - mild diffuse disease  LAD - 80% diffuse stenosis in the midsegment. Small diagonal branch is subtotally occluded  Left circumflex - large OM1 branch is subtotally occluded with CRISTÓBAL I flow  RCA - large dominant vessel, mild luminal irregularities     LVEDP: 25     -Cardiology plan for Cardioversion and   staged PCI later today under general anesthesia.  Will follow      Lila Reed MD  Hospitalist Physician

## 2023-03-10 NOTE — PROCEDURES
CARDIAC CATHETERIZATION REPORT     Procedure Date:  3/10/2023  Patient Name: Abraham Mckinnon  MRN: [id number] : 1960      : Se Junior MD    PROCEDURES PERFORMED  Left heart cath via right femoral Impella sheath  Impella CP placement  Coronary angiography  PCI to mid to distal LAD with LOLA placement  PCI to diagonal 2 with balloon angioplasty (POBA)  IVUS  FFR/DFR evaluation of left main  Distal aortic and iliofemoral arterial angiogram  PTA to proximal right common iliac  Moderate sedation 15 min CPT 00436  US guidance for vascular access CPT 97388      INDICATION  Ischemic cardiomyopathy    PROCEDURE DESCRIPTION  Risks/benefits/alternatives/outcomes were discussed with patient and/or family in detail and informed consent was obtained. Patient was prepped and draped in the usual sterile fashion.  MAC anesthesia was managed by anesthesia service. Then local anaesthetic was applied over right femoral puncture site. Using a modified Seldinger technique under ultrasound guidance, the right common femoral artery was selectively cannulated and a 6Fr sheath was inserted into right common femoral artery.  Limited right iliofemoral angiogram revealed a severe 80% stenosis in the right common iliac which underwent PTA with 11 x40mm balloon with improvement in stenosis to 20%.  Following this, after preclose deployment sheath was upsized to 14 Mongolian Impella sheath after serial dilation.  Impella CP device was advanced over the wire through the sheath and inserted into LV through the aortic valve.  Once positioned, good flows were noted.  A 7 Mongolian short sheath was inserted through the Impella sheath next to Impella catheter without difficulty.  A 7 Mongolian XB 3.5 guide catheter was engaged into the left main for the subsequent intervention.  See PCI details below. At the conclusion of the procedure, Impella CP was left in place and the sheath sutured after removal of peel-away portion of the  sheath. Patient's hemodynamics remained stable throughout the case. There were no immediate complications. We monitored the patient's level of consciousness and vital signs/physiologic status throughout the procedure duration. At the end of the procedure, Impella CP and a large bore Impella sheath was sutured in place. Patient tolerated the procedure well although his hemodynamics are borderline. FINDINGS  Left main - 40% diffuse disease which is not significant by DFR (0.99) or IVUS (MLA > 10 mm²)  LAD - 80% diffuse stenosis in the midsegment treated successfully with PCI and placement of long drug-eluting stent with good final result. Subtotally occluded diagonal 2 was treated with POBA with acceptable result  Left circumflex - large OM1 branch is subtotally occluded with CRISTÓBAL I flow  RCA - not engaged    LVEDP - 3 mmHg at beginning of procedure and 15 mmHg at the end of the procedure      DIAG 2 PCI -   There was a 80% diffuse lesion in the mid  left anterior descending artery  artery with CRISTÓBAL 3 flow. The guiding catheter XB 3.5  was advanced and selectively engaged the coronary artery. Anticoagulation was initiated with Heparin . ACT was therapeutic. Guide wire was crossed into diagonal 2 branch which was subtotally occluded. Serial dilations were performed using 2.5 x 20 mm compliant balloon with acceptable final result. LAD PCI  Following that predilatation of the LAD lesion was performed with 2.5 x 20mm balloon at a maximum pressure of 12 atmospheres. Repeat angiography revealed 50% residual lesion. Following that stenting of the residual lesion was performed with 3.0 x 48 mm synergy stent postdilated to 3.25 mm based on IVUS measurements. Final angiography revealed 0% residual stenosis and CRISTÓBAL 3 flow. SEDATION:  MAC anesthesia managed by anesthesia service      COMPLICATIONS: None      BLOOD LOSS: >200 cc      FINAL DIAGNOSIS  Severe multivessel CAD and ischemic cardiomyopathy.   LAD and diagonal 2 branch treated with PCI as above with Impella CP support      ASSESSMENT & PLAN  Transfer to ICU  IV heparin with goal  while Impella in place  Continue aspirin and Plavix, hold other meds  Patient to remain intubated through the course of Impella support which is to be left in place overnight  Tentative extubation and Impella removal tomorrow depending on hemodynamic status and clinical course        Mavis Thornton MD, [facility] [place], [address] [facility]  [phone] (c)    Inadvertent computerized transcription errors may be present

## 2023-03-11 PROBLEM — I25.110 CORONARY ARTERY DISEASE INVOLVING NATIVE CORONARY ARTERY OF NATIVE HEART WITH UNSTABLE ANGINA PECTORIS (HCC): Status: ACTIVE | Noted: 2023-03-11

## 2023-03-11 PROBLEM — I48.91 ATRIAL FIBRILLATION WITH RVR (HCC): Status: ACTIVE | Noted: 2023-03-11

## 2023-03-11 LAB
ANION GAP SERPL CALCULATED.3IONS-SCNC: 11 MMOL/L (ref 3–16)
APTT: 31.1 SEC (ref 23–34.3)
APTT: 36.5 SEC (ref 23–34.3)
APTT: 38.1 SEC (ref 23–34.3)
APTT: 42 SEC (ref 23–34.3)
APTT: 45.2 SEC (ref 23–34.3)
APTT: 63.2 SEC (ref 23–34.3)
APTT: 66.5 SEC (ref 23–34.3)
BASE EXCESS ARTERIAL: -3.5 MMOL/L (ref -3–3)
BASOPHILS ABSOLUTE: 0 K/UL (ref 0–0.2)
BASOPHILS RELATIVE PERCENT: 0.5 %
BUN BLDV-MCNC: 17 MG/DL (ref 7–20)
CALCIUM SERPL-MCNC: 9 MG/DL (ref 8.3–10.6)
CARBOXYHEMOGLOBIN ARTERIAL: 0.4 % (ref 0–1.5)
CHLORIDE BLD-SCNC: 105 MMOL/L (ref 99–110)
CO2: 21 MMOL/L (ref 21–32)
CREAT SERPL-MCNC: 0.8 MG/DL (ref 0.8–1.3)
EOSINOPHILS ABSOLUTE: 0.1 K/UL (ref 0–0.6)
EOSINOPHILS RELATIVE PERCENT: 1.1 %
GFR SERPL CREATININE-BSD FRML MDRD: >60 ML/MIN/{1.73_M2}
GLUCOSE BLD-MCNC: 91 MG/DL (ref 70–99)
HCO3 ARTERIAL: 19.9 MMOL/L (ref 21–29)
HCT VFR BLD CALC: 42.1 % (ref 40.5–52.5)
HEMOGLOBIN, ART, EXTENDED: 13.9 G/DL (ref 13.5–17.5)
HEMOGLOBIN: 13.9 G/DL (ref 13.5–17.5)
LYMPHOCYTES ABSOLUTE: 1.2 K/UL (ref 1–5.1)
LYMPHOCYTES RELATIVE PERCENT: 12.7 %
MCH RBC QN AUTO: 31.2 PG (ref 26–34)
MCHC RBC AUTO-ENTMCNC: 33.1 G/DL (ref 31–36)
MCV RBC AUTO: 94.2 FL (ref 80–100)
METHEMOGLOBIN ARTERIAL: 0.3 %
MONOCYTES ABSOLUTE: 1.2 K/UL (ref 0–1.3)
MONOCYTES RELATIVE PERCENT: 12.2 %
NEUTROPHILS ABSOLUTE: 7 K/UL (ref 1.7–7.7)
NEUTROPHILS RELATIVE PERCENT: 73.5 %
O2 SAT, ARTERIAL: 97.9 %
O2 THERAPY: ABNORMAL
PCO2 ARTERIAL: 31.1 MMHG (ref 35–45)
PDW BLD-RTO: 14.4 % (ref 12.4–15.4)
PH ARTERIAL: 7.42 (ref 7.35–7.45)
PLATELET # BLD: 262 K/UL (ref 135–450)
PMV BLD AUTO: 7.9 FL (ref 5–10.5)
PO2 ARTERIAL: 126.8 MMHG (ref 75–108)
POTASSIUM REFLEX MAGNESIUM: 4 MMOL/L (ref 3.5–5.1)
RBC # BLD: 4.47 M/UL (ref 4.2–5.9)
SODIUM BLD-SCNC: 137 MMOL/L (ref 136–145)
TCO2 ARTERIAL: 20.8 MMOL/L
WBC # BLD: 9.5 K/UL (ref 4–11)

## 2023-03-11 PROCEDURE — 6370000000 HC RX 637 (ALT 250 FOR IP): Performed by: INTERNAL MEDICINE

## 2023-03-11 PROCEDURE — 85025 COMPLETE CBC W/AUTO DIFF WBC: CPT

## 2023-03-11 PROCEDURE — 36415 COLL VENOUS BLD VENIPUNCTURE: CPT

## 2023-03-11 PROCEDURE — 2580000003 HC RX 258: Performed by: NURSE PRACTITIONER

## 2023-03-11 PROCEDURE — 2000000000 HC ICU R&B

## 2023-03-11 PROCEDURE — 33992 RMVL PERQ LEFT HEART VAD: CPT | Performed by: INTERNAL MEDICINE

## 2023-03-11 PROCEDURE — 80048 BASIC METABOLIC PNL TOTAL CA: CPT

## 2023-03-11 PROCEDURE — 6370000000 HC RX 637 (ALT 250 FOR IP): Performed by: ANESTHESIOLOGY

## 2023-03-11 PROCEDURE — 2100000000 HC CCU R&B

## 2023-03-11 PROCEDURE — 94003 VENT MGMT INPAT SUBQ DAY: CPT

## 2023-03-11 PROCEDURE — 6370000000 HC RX 637 (ALT 250 FOR IP): Performed by: NURSE PRACTITIONER

## 2023-03-11 PROCEDURE — 99291 CRITICAL CARE FIRST HOUR: CPT | Performed by: INTERNAL MEDICINE

## 2023-03-11 PROCEDURE — 6360000002 HC RX W HCPCS: Performed by: HOSPITALIST

## 2023-03-11 PROCEDURE — 94761 N-INVAS EAR/PLS OXIMETRY MLT: CPT

## 2023-03-11 PROCEDURE — 2500000003 HC RX 250 WO HCPCS: Performed by: ANESTHESIOLOGY

## 2023-03-11 PROCEDURE — 85730 THROMBOPLASTIN TIME PARTIAL: CPT

## 2023-03-11 PROCEDURE — 6360000002 HC RX W HCPCS: Performed by: INTERNAL MEDICINE

## 2023-03-11 PROCEDURE — 2700000000 HC OXYGEN THERAPY PER DAY

## 2023-03-11 PROCEDURE — 6360000002 HC RX W HCPCS: Performed by: ANESTHESIOLOGY

## 2023-03-11 PROCEDURE — 99291 CRITICAL CARE FIRST HOUR: CPT | Performed by: STUDENT IN AN ORGANIZED HEALTH CARE EDUCATION/TRAINING PROGRAM

## 2023-03-11 PROCEDURE — 82803 BLOOD GASES ANY COMBINATION: CPT

## 2023-03-11 RX ORDER — HEPARIN SODIUM 10000 [USP'U]/100ML
1630 INJECTION, SOLUTION INTRAVENOUS CONTINUOUS
Status: DISCONTINUED | OUTPATIENT
Start: 2023-03-11 | End: 2023-03-12

## 2023-03-11 RX ORDER — HEPARIN SODIUM 10000 [USP'U]/100ML
360 INJECTION, SOLUTION INTRAVENOUS CONTINUOUS
Status: DISCONTINUED | OUTPATIENT
Start: 2023-03-11 | End: 2023-03-11 | Stop reason: SDUPTHER

## 2023-03-11 RX ADMIN — FAMOTIDINE 20 MG: 10 INJECTION, SOLUTION INTRAVENOUS at 10:18

## 2023-03-11 RX ADMIN — Medication 15 ML: at 20:03

## 2023-03-11 RX ADMIN — Medication 10 ML: at 10:20

## 2023-03-11 RX ADMIN — ASPIRIN 81 MG 81 MG: 81 TABLET ORAL at 10:19

## 2023-03-11 RX ADMIN — PROPOFOL 55 MCG/KG/MIN: 10 INJECTION, EMULSION INTRAVENOUS at 07:01

## 2023-03-11 RX ADMIN — PROPOFOL 55 MCG/KG/MIN: 10 INJECTION, EMULSION INTRAVENOUS at 11:24

## 2023-03-11 RX ADMIN — Medication 15 ML: at 10:20

## 2023-03-11 RX ADMIN — AMIODARONE HYDROCHLORIDE 400 MG: 200 TABLET ORAL at 10:18

## 2023-03-11 RX ADMIN — AMIODARONE HYDROCHLORIDE 400 MG: 200 TABLET ORAL at 20:03

## 2023-03-11 RX ADMIN — SPIRONOLACTONE 25 MG: 25 TABLET, FILM COATED ORAL at 10:20

## 2023-03-11 RX ADMIN — HEPARIN SODIUM 1630 UNITS/HR: 10000 INJECTION, SOLUTION INTRAVENOUS at 19:27

## 2023-03-11 RX ADMIN — ATORVASTATIN CALCIUM 40 MG: 40 TABLET, FILM COATED ORAL at 20:03

## 2023-03-11 RX ADMIN — HEPARIN SODIUM 360 UNITS/HR: 10000 INJECTION, SOLUTION INTRAVENOUS at 00:19

## 2023-03-11 RX ADMIN — CLOPIDOGREL BISULFATE 75 MG: 75 TABLET ORAL at 10:18

## 2023-03-11 RX ADMIN — PROPOFOL 55 MCG/KG/MIN: 10 INJECTION, EMULSION INTRAVENOUS at 15:09

## 2023-03-11 RX ADMIN — FAMOTIDINE 20 MG: 10 INJECTION, SOLUTION INTRAVENOUS at 20:03

## 2023-03-11 RX ADMIN — Medication 10 ML: at 20:03

## 2023-03-11 RX ADMIN — PROPOFOL 55 MCG/KG/MIN: 10 INJECTION, EMULSION INTRAVENOUS at 03:08

## 2023-03-11 ASSESSMENT — PULMONARY FUNCTION TESTS
PIF_VALUE: 17
PIF_VALUE: 21
PIF_VALUE: 18
PIF_VALUE: 18
PIF_VALUE: 17
PIF_VALUE: 18
PIF_VALUE: 18
PIF_VALUE: 17
PIF_VALUE: 18
PIF_VALUE: 27
PIF_VALUE: 18
PIF_VALUE: 19
PIF_VALUE: 19
PIF_VALUE: 18
PIF_VALUE: 22
PIF_VALUE: 18
PIF_VALUE: 17
PIF_VALUE: 18
PIF_VALUE: 17
PIF_VALUE: 19
PIF_VALUE: 18
PIF_VALUE: 20
PIF_VALUE: 18
PIF_VALUE: 21
PIF_VALUE: 11
PIF_VALUE: 18
PIF_VALUE: 17
PIF_VALUE: 21
PIF_VALUE: 18
PIF_VALUE: 21
PIF_VALUE: 22
PIF_VALUE: 17
PIF_VALUE: 21
PIF_VALUE: 20
PIF_VALUE: 21
PIF_VALUE: 17
PIF_VALUE: 17
PIF_VALUE: 15
PIF_VALUE: 19
PIF_VALUE: 17
PIF_VALUE: 21
PIF_VALUE: 18
PIF_VALUE: 24
PIF_VALUE: 21
PIF_VALUE: 17
PIF_VALUE: 18
PIF_VALUE: 21
PIF_VALUE: 18
PIF_VALUE: 18
PIF_VALUE: 17

## 2023-03-11 ASSESSMENT — PAIN SCALES - GENERAL
PAINLEVEL_OUTOF10: 0
PAINLEVEL_OUTOF10: 0

## 2023-03-11 NOTE — CONSULTS
[facility] Heparin Monitoring    aPTT= 42 at 2347 ; will start supplemental heparin at 360 units/hour    Purge solution 11.3 mL/hr (282.5 units/hr) + Supplemental infusion at 3.6 mL/hr (360 units/hr)  Total heparin per hour= 642.5 units    Recheck aPTT in 2 hours at [facility], PharmD 3/11/2023 12:11 AM    -----------------------------------  3/11 0247  aPTT= 36.5 at 0223 ; will increase supplemental heparin by 360 units/hour    Purge solution 11.4 mL/hr (285 units/hr) + Supplemental infusion at 7.2 mL/hr (720 units/hr)  Total heparin per hour= 1005 units    Recheck aPTT in 2 hours at [facility], PharmD 3/11/2023 2:44 AM    ----------------------------------  3/11 0453  aPTT= 38.1 at 0439 ; will increase supplemental heparin by 360 units/hour    Purge solution 11.4 mL/hr (285 units/hr) + Supplemental infusion at 10.8 mL/hr (1080 units/hr)  Total heparin per hour= 1365 units    Recheck aPTT in 2 hours at [facility], PharmD 3/11/2023 4:52 AM    ----------------------------------  3/11 0743  aPTT= 45.2 at 0707 ; will increase supplemental heparin by 360 units/hour    Purge solution 11.4 mL/hr (285 units/hr) + Supplemental infusion at 14.4 mL/hr (1440 units/hr)  Total heparin per hour= 1725 units    Recheck aPTT in 2 hours at [facility], PharmD 3/11/2023 7:41 AM    ------------------------------------------    3/11/2023  aPTT = 63.2 sec at 0930  Will increase supplemental heparin by 180 units/hour  Purge solution 10.9 mL/hr (272.5 units/hr) + Supplemental infusion at 16.3 mL/hr (1630 units/hr)  Total heparin per hour= 1902.5 units  Recheck aPTT in 2 hours at [facility], PharmD  3/11/2023 10:23 AM

## 2023-03-11 NOTE — PROGRESS NOTES
03/11/23 0407   Patient Observation   Heart Rate (!) 47   Resp 14   SpO2 100 %   Breath Sounds   Right Upper Lobe Clear   Right Middle Lobe Diminished   Right Lower Lobe Clear   Left Upper Lobe Diminished   Left Lower Lobe Diminished   Vent Information   Vent Mode AC/VC   Ventilator Settings   FiO2  60 %   Vt (Set, mL) 600 mL   Resp Rate (Set) 14 bmp   PEEP/CPAP (cmH2O) 5   Pressure Support (cm H2O) 0 cm H2O   Vent Patient Data (Readings)   Vt (Measured) 612 mL   Peak Inspiratory Pressure (cmH2O) 21 cmH2O   Rate Measured 15 br/min   Minute Volume (L/min) 9.02 Liters   Mean Airway Pressure (cmH2O) 9.5 cmH20   I:E Ratio 1:2.20   Backup Apnea On   Vent Alarm Settings   High Pressure (cmH2O) 45 cmH2O   Low Minute Volume (lpm) 2 L/min   High Minute Volume (lpm) 20 L/min   Low Exhaled Vt (ml) 200 mL   High Exhaled Vt (ml) 1000 mL   RR High (bpm) 40 br/min   Apnea (secs) 20 secs   Additional Respiratoray Assessments   Humidification Source HME   Ambu Bag With Mask At Bedside Yes   Airway Clearance   Suction   (Not indicated at this time)   ETT    Placement Date/Time: 03/10/23 1549   Present on Admission/Arrival: No  Placed By: Licensed provider; In surgery  Placement Verified By: Capnometry; Auscultation  Preoxygenation: Yes  Mask Ventilation: Ventilated by mask with oral airway (2)  Technique: . ..    Secured At 24 cm   Measured From Lips   ETT Placement Center   Secured By Commercial tube hanks   Site Assessment Cool

## 2023-03-11 NOTE — PROGRESS NOTES
[address]   Progress Note  Cardiology    CC: SOB    HPI: remains intubated and sedated. Requiring Impella     Past Medical History   has no past medical history on file. Past Surgical History   has no past surgical history on file. Social History   reports that he has been smoking cigarettes. He has never used smokeless tobacco. He reports current alcohol use. He reports that he does not currently use drugs. Family History  family history is not on file. Medications  Prior to Admission medications    Not on File       Allergies  Patient has no known allergies. Review of Systems:   Reviewed. No changes except as noted in HPI and A/P      Lab Results   Component Value Date    WBC 9.5 03/11/2023    HGB 13.9 03/11/2023    HCT 42.1 03/11/2023    MCV 94.2 03/11/2023     03/11/2023     Lab Results   Component Value Date    CREATININE 0.8 03/11/2023    BUN 17 03/11/2023     03/11/2023    K 4.0 03/11/2023     03/11/2023    CO2 21 03/11/2023     Lab Results   Component Value Date    INR 1.27 (H) 03/10/2023    PROTIME 15.8 (H) 03/10/2023       I reviewed EKGs and radiology imaging. Pertinent findings and changes as described in assessment below. Physical Examination:    /77   Pulse 58   Temp 98.3 °F (36.8 °C) (Bladder)   Resp 18   Ht 6' 2\" (1.88 m)   Wt 212 lb 8.4 oz (96.4 kg)   SpO2 97%   BMI 27.29 kg/m²      General Appearance:  intubated, appears stated age   Head:  Normocephalic, without obvious abnormality, atraumatic   Eyes:  PERRL, conjunctiva/corneas clear         Nose: Nares normal, no drainage or sinus tenderness   Throat: ETT in place   Neck: symmetrical, trachea midline,          Lungs:   rales to auscultation bilaterally    Chest Wall:  No deformity   Heart:  Regular rate and rhythm, S1, S2 normal, no murmur, rub or gallop   Abdomen:   Soft, decreased bowel sounds                Extremities: No cyanosis or edema.  Impella in place   Pulses: 2+ and symmetric   Skin: Skin color, texture, turgor normal, no rashes    Pysch: Normal mood and affect   Neurologic: Limited. sedated      Reviewed: BMP, CBC, trop, BNP, Cath, CXR, Echo, Impella data, old records    Assessment:    Severe CMP, EF 20%. Unstable requiring Impella ventricular assist devise   Severe CAD - s/p PCI 3/10/2023  Atrial fibrillation   Bradycardia - requiring intermittent chronotropes   Acute systolic heart failure   Hypertension - BP has been low  Hypotension requiring mechanical support and intermittent inotropes   Acute respiratory failure with hypoxia  Acute pulmonary edema  Mitral regurgitation  Pulmonary hypertension  Overweight  Current smoker  Alcohol use    Plan  Attempt to wean down Impella  Keep intubated while Impella in place   IV dopamine as necessary for bradycardia/hypotension  DAPT, statin  Add Entresto when BP will tolerate  Hold BBlk    Due to the high probability of clinically significant life threating deterioration of the patient's condition that required my urgent intervention, a total critical care time 40 minutes was used. This time excludes any time that may have been spent performing procedures. This includes but not limited to vital sign monitoring, telemetry monitoring, continuous pulse oximety, IV medication, clinical response to the IV medications, documentation time , consultation time, interpretation of lab data, review of nursing notes and old record review.      Ronda Palmer MD, 3/11/2023 11:51 AM

## 2023-03-11 NOTE — PROGRESS NOTES
Dr. Parvez hernandez served at this time. Patient trying to sit up in bed, grabbing for ETT. He ordered cristina wrist restraints. I titrated up on propofol.    Electronically signed by Ricardo Schaefer RN on 3/10/2023 at 8:33 PM

## 2023-03-11 NOTE — PROGRESS NOTES
Hospitalist Progress Note      PCP: No primary care provider on file. Date of Admission: 3/3/2023    Chief Complaint: Palpitations and Shortness of Breath       Hospital Course: This is a 69-year-old male with a past medical history of alcohol abuse, tobacco abuse newly diagnosed heart failure with reduced EF admitted with shortness of breath and palpitation intubated and sedated unable to give any history according to the EMR patient with a chief complaint of shortness of breath fatigue dyspnea on exertion for 3 weeks on admission found to be A-fib with RVR echo showing reduced EF of 15 to 20% patient had a SUSI and cardioversion currently in normal sinus rhythm, left heart catheterization with 6 showed severe multivessel disease Impella removal on 3/10/2023 cardiology, pulmonary critical care consulted and following.     Subjective: Patient is on ventilator sedated      Medications:  Reviewed    Infusion Medications    heparin (porcine) 1,440 Units/hr (03/11/23 0749)    sodium chloride 25 mL/hr at 03/10/23 1830    propofol 55 mcg/kg/min (03/11/23 0701)    DOPamine 2.5 mcg/kg/min (03/10/23 1940)    heparin (Impella Purge) solution       Scheduled Medications    sodium chloride flush  5-40 mL IntraVENous 2 times per day    chlorhexidine  15 mL Mouth/Throat BID    famotidine (PEPCID) injection  20 mg IntraVENous BID    metoprolol succinate  50 mg Oral BID    aspirin  81 mg Oral Daily    atorvastatin  40 mg Oral Nightly    clopidogrel  75 mg Oral Daily    [Held by provider] furosemide  40 mg IntraVENous TID    spironolactone  25 mg Oral Daily    amiodarone  400 mg Oral BID    nicotine  1 patch TransDERmal Daily     PRN Meds: sodium chloride flush, sodium chloride, fentanNYL, acetaminophen, heparin (porcine), heparin (porcine), prochlorperazine, polyethylene glycol, acetaminophen **OR** acetaminophen, perflutren lipid microspheres      Intake/Output Summary (Last 24 hours) at 3/11/2023 6755  Last data filed at 3/11/2023 0843  Gross per 24 hour   Intake 1493.83 ml   Output 1080 ml   Net 413.83 ml       Physical Exam Performed:    /81   Pulse 52   Temp 98.3 °F (36.8 °C) (Bladder)   Resp 16   Ht 6' 2\" (1.88 m)   Wt 212 lb 8.4 oz (96.4 kg)   SpO2 100%   BMI 27.29 kg/m²     General appearance: No apparent distress, appears stated age and on ventilator  HEENT: Pupils equal, round, and reactive to light. Conjunctivae/corneas clear.  Neck: Supple, with full range of motion. No jugular venous distention. Trachea midline.  Respiratory:  Normal respiratory effort. Clear to auscultation, bilaterally without Rales/Wheezes/Rhonchi.  Cardiovascular: Regular rate and rhythm with normal S1/S2 without murmurs, rubs or gallops.  Abdomen: Soft, non-tender, non-distended with normal bowel sounds.  Musculoskeletal: No clubbing, cyanosis or edema bilaterally.  Full range of motion without deformity.  Skin: Skin color, texture, turgor normal.  No rashes or lesions.  Neurologic: On ventilator  Psychiatric: on ventilator  Capillary Refill: Brisk, 3 seconds, normal   Peripheral Pulses: +2 palpable, equal bilaterally       Labs:   Recent Labs     03/10/23  0709 03/10/23  1538 03/11/23  0223   WBC 7.6 6.2 9.5   HGB 14.6 13.1* 13.9   HCT 44.0 39.2* 42.1    262 262     Recent Labs     03/10/23  0709 03/10/23  1538 03/11/23  0223    135* 137   K 3.8 4.0 4.0    102 105   CO2 23 23 21   BUN 19 18 17   CREATININE 0.9 0.8 0.8   CALCIUM 9.4 8.5 9.0   PHOS  --  4.4  --      No results for input(s): AST, ALT, BILIDIR, BILITOT, ALKPHOS in the last 72 hours.  Recent Labs     03/10/23  1849   INR 1.27*     No results for input(s): CKTOTAL, TROPONINI in the last 72 hours.    Urinalysis:    No results found for: NITRU, WBCUA, BACTERIA, RBCUA, BLOODU, SPECGRAV, GLUCOSEU    Radiology:  XR ABDOMEN FOR NG/OG/NE TUBE PLACEMENT   Final Result   Enteric tube tip and side-port in the proximal stomach below the   gastroesophageal junction.     XR CHEST PORTABLE   Final Result   No significant change from prior exam.  Impella unchanged terminating in the   region of the left ventricle. XR CHEST PORTABLE   Final Result   Status post ET tube placement in good position with slight decrease in the   heart size and slowly resolving central pulmonary congestion         XR CHEST PORTABLE   Final Result   No acute cardiopulmonary findings         XR CHEST PORTABLE   Final Result   1. Cardiomegaly with bilateral lung infiltrates and bilateral pleural   effusions, likely related to pulmonary edema. Superimposed pneumonia cannot   be excluded. IP CONSULT TO CARDIOLOGY  IP CONSULT TO PULMONOLOGY  IP CONSULT TO DIETITIAN  IP CONSULT TO CRITICAL CARE    Assessment/Plan:    Active Hospital Problems    Diagnosis     Acute respiratory failure with hypoxia ([facility] [address] [J96.01]      Priority: Medium    Acute pulmonary edema (HCC) [J81.0]      Priority: Medium    Mitral regurgitation [I34.0]      Priority: Medium    Biatrial enlargement [I51.7]      Priority: Medium    Pulmonary hypertension ([facility] [address] [I27.20]      Priority: Medium    Overweight (BMI 25.0-29. 9) [E66.3]      Priority: Medium    Current smoker [F17.200]      Priority: Medium    Alcohol use [Z78.9]      Priority: Medium    Acute systolic heart failure (HCC) [I50.21]      Priority: Medium    Cardiomyopathy ([facility] [address] [I42.9]      Priority: Medium    Uncontrolled hypertension [I10]      Priority: Medium    Atrial fibrillation with rapid ventricular response (HCC) [I48.91]      Priority: Medium    1. This is a 70-year-old male admitted with shortness of breath atrial fibrillation with RVR cardiology consulted status post cardiac cath with severe multivessel coronary artery disease, ischemic cardiomyopathy cardiology consulted and following. 2.  On ventilator intubated Vent management per pulmonary critical care  3. Tobacco abuse continue with the nicotine patch. 4.   Hyperlipidemia on statin.   5. Hypertension continue with current medication.       DVT Prophylaxis:   Diet: Diet NPO Exceptions are: Sips of Water with Meds  Code Status: Full Code  PT/OT Eval Status:     Dispo -     Appropriate for A1 Discharge Unit: Jaki Nolan MD

## 2023-03-11 NOTE — PROGRESS NOTES
Spoke with Dr. Elissa Ma over phone. Orders to restart heparin gtt. Aptt ordered- will restart after lab draw.

## 2023-03-11 NOTE — CONSULTS
PULMONARY AND CRITICAL CARE INPATIENT CONSULTATION      3/11/2023    Patient Name:  Celeste Hall       1960       Evaluation was requested by Dr. Julio César Priest regarding acute hypoxic respiratory failure. HPI:   Patient is a 58 y.o. male with significant PMHx tobacco dependence, EtOH abuse, newly diagnosed HFrEF that presented with SOB and palpitations. Pt is intubated and sedated and unable to give history. According to EMR, pt was having SOB, fatigue, and BERNAL for 3 weeks. He was admitted and found to be in A-fib RVR with ECHO showing newly diagnosed reduced EF of 15-20%. Pt had a SUSI and cardioversion which converted him to normal sinus. Pt got a LHC which showed severe multivessel disease. He had impella placed on 3/10/23. Critical care was consulted for vent management. Invasive Lines: PICC D#None   CVC D#None  Art Line D#None          ROS:   Review of Systems   Unable to perform ROS: Intubated        History reviewed. No pertinent past medical history. History reviewed. No pertinent surgical history. History reviewed. No pertinent family history. Social History     Tobacco Use    Smoking status: Every Day     Types: Cigarettes    Smokeless tobacco: Never   Substance Use Topics    Alcohol use: Yes     Comment: daily        No Known Allergies      Vital Signs:  Blood pressure 113/81, pulse 52, temperature 98.3 °F (36.8 °C), temperature source Bladder, resp. rate 16, height 6' 2\" (1.88 m), weight 212 lb 8.4 oz (96.4 kg), SpO2 100 %.' on RA  Body mass index is 27.29 kg/m². CVP:      Intake/Output Summary (Last 24 hours) at 3/11/2023 1029  Last data filed at 3/11/2023 0843  Gross per 24 hour   Intake 1493.83 ml   Output 1140 ml   Net 353.83 ml         PHYSICAL EXAM:  Physical Exam  Constitutional:       General: He is not in acute distress. Appearance: He is not toxic-appearing. Comments: Intubated and sedated   HENT:      Head: Normocephalic and atraumatic.       Nose: Nose normal. Mouth/Throat:      Pharynx: No oropharyngeal exudate. Eyes:      General: No scleral icterus. Right eye: No discharge. Left eye: No discharge. Cardiovascular:      Rate and Rhythm: Normal rate and regular rhythm. Heart sounds: No murmur heard. No friction rub. No gallop. Pulmonary:      Effort: Pulmonary effort is normal. No respiratory distress. Breath sounds: No wheezing or rales. Abdominal:      General: Abdomen is flat. Bowel sounds are normal.      Palpations: Abdomen is soft. Musculoskeletal:         General: No swelling. Cervical back: Normal range of motion. Skin:     General: Skin is warm and dry. Neurological:      Comments: Intubated and sedated        Results:  CBC:   Recent Labs     03/10/23  0709 03/10/23  1538 03/11/23 0223   WBC 7.6 6.2 9.5   HGB 14.6 13.1* 13.9   HCT 44.0 39.2* 42.1   MCV 93.2 93.3 94.2    262 262     BMP:   Recent Labs     03/10/23  0709 03/10/23  1538 03/11/23 0223    135* 137   K 3.8 4.0 4.0    102 105   CO2 23 23 21   PHOS  --  4.4  --    BUN 19 18 17   CREATININE 0.9 0.8 0.8       Echocardiogram:  3/3/23  Conclusions      Summary   Technically difficult study due to patient is a smoker and poor acoustic   window. Difficult to assess left ventricular systolic function due to arrhythmia but   appears severely reduced ef 15-20%. Severe global HK with regional variation. Mild concentric left ventricular hypertrophy. Left ventricular diastolic filling pressure is elevated. Mild to moderate mitral regurgitation. Moderate Bi-atrial enlargement. Right ventricular systolic function is mildly reduced . Mild tricuspid regurgitation. Systolic pulmonic artery pressure (SPAP) is estimated at 41 mmHg consistent   with mild pulmonary hypertension (Right atrial pressure of 8 mmHg). PFT:    Imaging:  I have reviewed radiology images personally.     XR CHEST PORTABLE    Result Date: 3/10/2023  No significant change from prior exam.  Impella unchanged terminating in the region of the left ventricle. XR CHEST PORTABLE    Result Date: 3/10/2023  Status post ET tube placement in good position with slight decrease in the heart size and slowly resolving central pulmonary congestion     XR ABDOMEN FOR NG/OG/NE TUBE PLACEMENT    Result Date: 3/10/2023  Enteric tube tip and side-port in the proximal stomach below the gastroesophageal junction. ASSESSMENT:  Severe Multivessel CAD and ischemic cardiomyopathy s/p Impella placement  HFrEF  A-fib  Acute Respiratory Failure  Tobacco dependence  EtOH abuse    Plan:   - Cont sedation/analgesia of propofol and fentanyl with goal RASS 0 to -2. We will try on SBT when patient's Impella is removed. - No pressors required, keep goal MAP > 65 or SBP > 90  - Full vent support - decreased FiO2 to 40%, cont PEEP 5  - SBT when cleared by Cardiology to try and extubate  - ABG daily  - vent bundle  - NPO, hold GI ppx given possibility of extubation today  - Replace electrolytes PRN  - SSl for hyperglycemia with goal 140-180  - Heparin gtt for DVT ppx/A-fib      Ppx/Velazquez/Lines  - PIV  - Velazquez  - No GI ppx, Heparin gtt    Critical care time spent reviewing labs/films, examining patient, collaborating with other physicians but excluding procedures for life threatening organ failure is 45 minutes. Thank you for the consultation. We will continue to follow with you.     Electronically signed by:  Domitila Morejon MD    3/11/2023    10:29 AM.

## 2023-03-11 NOTE — BRIEF OP NOTE
Brief Postoperative Note      Patient: Darlene Madrigal  YOB: 1960  MRN: [id number]    Date of Procedure: 3/10/2023    Pre-Op Diagnosis: Coronary artery disease involving native heart, unspecified vessel or lesion type, unspecified whether angina present [I25.10]    Post-Op Diagnosis: Same       Procedure:  Impella LV support device removal    Physician:  Blanquita Loaiza    Assistant:  Surgical Assistant: Hall Lanes, RN    Anesthesia: Moderate sedation on with Propofol. Patient is untubated. Estimated Blood Loss (mL): less than 50     Complications: None    Specimens:   None    Implants:  None      Findings:   Successful removal of Impella LV support device from right femoral arterial access site. 2 Perclose sutures deployed. 10 minutes of manual pressure applied with good hemostasis. No complications from the procedure. Good right pedal pulses by Doppler.      Electronically signed by Katty Bustillo MD on 3/11/2023 at 1:01 PM

## 2023-03-11 NOTE — PROGRESS NOTES
03/10/23 2038   Patient Observation   Heart Rate (!) 46   Resp 15   SpO2 100 %   Breath Sounds   Right Upper Lobe Clear   Right Middle Lobe Diminished   Right Lower Lobe Diminished   Left Upper Lobe Clear   Left Lower Lobe Diminished   Vent Information   Vent Mode AC/VC   Ventilator Settings   FiO2  80 %   Vt (Set, mL) 600 mL   Resp Rate (Set) 14 bmp   PEEP/CPAP (cmH2O) 5   Pressure Support (cm H2O) 0 cm H2O   Vent Patient Data (Readings)   Vt (Measured) 619 mL   Peak Inspiratory Pressure (cmH2O) 16 cmH2O   Rate Measured 14 br/min   Minute Volume (L/min) 8.84 Liters   Mean Airway Pressure (cmH2O) 8.6 cmH20   I:E Ratio 1:2.30   Vent Alarm Settings   High Pressure (cmH2O) 40 cmH2O   Low Minute Volume (lpm) 2 L/min   High Minute Volume (lpm) 20 L/min   Low Exhaled Vt (ml) 200 mL   High Exhaled Vt (ml) 1000 mL   RR High (bpm) 40 br/min   Apnea (secs) 20 secs   Additional Respiratoray Assessments   Humidification Source HME   Ambu Bag With Mask At Bedside Yes   Airway Clearance   Suction   (none)   ETT    Placement Date/Time: 03/10/23 1549   Present on Admission/Arrival: No  Placed By: Licensed provider; In surgery  Placement Verified By: Capnometry; Auscultation  Preoxygenation: Yes  Mask Ventilation: Ventilated by mask with oral airway (2)  Technique: . ..    Secured At 24 cm   Measured From Lips   ETT Placement Left   Secured By Commercial tube hanks   Site Assessment Dry   Cuff Pressure 28 cm H2O

## 2023-03-11 NOTE — PROGRESS NOTES
03/11/23 0829   Patient Observation   Heart Rate 50   Resp 14   Vent Information   Vent Mode AC/VC   Ventilator Settings   FiO2  50 %   Vt (Set, mL) 600 mL   Resp Rate (Set) 14 bmp   PEEP/CPAP (cmH2O) 5   Pressure Support (cm H2O) 0 cm H2O   Vent Patient Data (Readings)   Vt (Measured) 678 mL   Peak Inspiratory Pressure (cmH2O) 17 cmH2O   Rate Measured 16 br/min   Minute Volume (L/min) 9.73 Liters   Mean Airway Pressure (cmH2O) 9.3 cmH20   I:E Ratio 1:2.30   Flow Sensitivity 3 L/min   Vent Alarm Settings   High Pressure (cmH2O) 45 cmH2O   Low Minute Volume (lpm) 2 L/min   High Minute Volume (lpm) 20 L/min   Low Exhaled Vt (ml) 200 mL   High Exhaled Vt (ml) 1000 mL   RR High (bpm) 40 br/min   Apnea (secs) 20 secs   Additional Respiratoray Assessments   Humidification Source HME   Circuit Condensation Not drained   Ambu Bag With Mask At Bedside Yes   ETT    Placement Date/Time: 03/10/23 1102   Present on Admission/Arrival: No  Placed By: Licensed provider; In surgery  Placement Verified By: Capnometry; Auscultation  Preoxygenation: Yes  Mask Ventilation: Ventilated by mask with oral airway (2)  Technique: . ..    Secured At 24 cm   Measured From Lips   ETT Placement Right   Secured By Commercial tube hanks   Site Assessment Dry   Cuff Pressure 28 cm H2O

## 2023-03-11 NOTE — PROGRESS NOTES
03/11/23 1201   Patient Observation   Heart Rate 56   Resp 16   SpO2 92 %   Vent Information   Vent Mode AC/VC   Ventilator Settings   FiO2  30 %   Vt (Set, mL) 600 mL   Resp Rate (Set) 14 bmp   PEEP/CPAP (cmH2O) 5   Pressure Support (cm H2O) 0 cm H2O   Vent Patient Data (Readings)   Vt (Measured) 607 mL   Peak Inspiratory Pressure (cmH2O) 20 cmH2O   Rate Measured 16 br/min   Minute Volume (L/min) 9.99 Liters   Mean Airway Pressure (cmH2O) 9.9 cmH20   I:E Ratio 1:1.80   Flow Sensitivity 3 L/min   Vent Alarm Settings   High Pressure (cmH2O) 45 cmH2O   Low Minute Volume (lpm) 2 L/min   RR High (bpm) 40 br/min   Additional Respiratoray Assessments   Humidification Source HME   Circuit Condensation Not drained   Ambu Bag With Mask At Bedside Yes   ETT    Placement Date/Time: 03/10/23 1549   Present on Admission/Arrival: No  Placed By: Licensed provider; In surgery  Placement Verified By: Capnometry; Auscultation  Preoxygenation: Yes  Mask Ventilation: Ventilated by mask with oral airway (2)  Technique: . ..    Secured At 24 cm   Measured From Lips   ETT Placement Left   Secured By Commercial tube hanks   Site Assessment Dry

## 2023-03-11 NOTE — PROGRESS NOTES
Fem stop off at this time - site WNL - covered with gauze and tegaderm - existing dressing not removed - site does not appear to be oozing anymore. Right foor color WNL and pulses present with doppler.    Electronically signed by Mao August RN on 3/10/2023 at 9:07 PM

## 2023-03-11 NOTE — PROGRESS NOTES
Order from Dr. Bobbi Cárdenas to extubate. Pt extubated to room air and tolerating well. Weak cough, but does cough up secretions appropriately and use suction device to clear. Small amount of emesis with extubation. Lung sounds clear bilaterally. A&O to self and place, reoriented to situation and time. Call light placed within reach. Denies further questions. Family called and updated on status and encouraged to visit.

## 2023-03-12 LAB
ANION GAP SERPL CALCULATED.3IONS-SCNC: 11 MMOL/L (ref 3–16)
ANION GAP SERPL CALCULATED.3IONS-SCNC: 8 MMOL/L (ref 3–16)
ANTI-XA UNFRAC HEPARIN: 0.37 IU/ML (ref 0.3–0.7)
ANTI-XA UNFRAC HEPARIN: 0.45 IU/ML (ref 0.3–0.7)
BASOPHILS ABSOLUTE: 0.1 K/UL (ref 0–0.2)
BASOPHILS RELATIVE PERCENT: 0.7 %
BUN BLDV-MCNC: 13 MG/DL (ref 7–20)
BUN BLDV-MCNC: 14 MG/DL (ref 7–20)
CALCIUM IONIZED: 1.13 MMOL/L (ref 1.12–1.32)
CALCIUM SERPL-MCNC: 9.1 MG/DL (ref 8.3–10.6)
CALCIUM SERPL-MCNC: 9.2 MG/DL (ref 8.3–10.6)
CHLORIDE BLD-SCNC: 103 MMOL/L (ref 99–110)
CHLORIDE BLD-SCNC: 103 MMOL/L (ref 99–110)
CO2: 22 MMOL/L (ref 21–32)
CO2: 23 MMOL/L (ref 21–32)
CREAT SERPL-MCNC: 0.8 MG/DL (ref 0.8–1.3)
CREAT SERPL-MCNC: 1 MG/DL (ref 0.8–1.3)
EKG ATRIAL RATE: 78 BPM
EKG DIAGNOSIS: NORMAL
EKG P AXIS: 72 DEGREES
EKG P-R INTERVAL: 182 MS
EKG Q-T INTERVAL: 496 MS
EKG QRS DURATION: 102 MS
EKG QTC CALCULATION (BAZETT): 565 MS
EKG R AXIS: 88 DEGREES
EKG T AXIS: 99 DEGREES
EKG VENTRICULAR RATE: 78 BPM
EOSINOPHILS ABSOLUTE: 0.1 K/UL (ref 0–0.6)
EOSINOPHILS RELATIVE PERCENT: 0.5 %
GFR SERPL CREATININE-BSD FRML MDRD: >60 ML/MIN/{1.73_M2}
GFR SERPL CREATININE-BSD FRML MDRD: >60 ML/MIN/{1.73_M2}
GLUCOSE BLD-MCNC: 87 MG/DL (ref 70–99)
GLUCOSE BLD-MCNC: 91 MG/DL (ref 70–99)
HCT VFR BLD CALC: 40.8 % (ref 40.5–52.5)
HEMOGLOBIN: 13.3 G/DL (ref 13.5–17.5)
LACTIC ACID: 1.3 MMOL/L (ref 0.4–2)
LYMPHOCYTES ABSOLUTE: 1.5 K/UL (ref 1–5.1)
LYMPHOCYTES RELATIVE PERCENT: 13 %
MAGNESIUM: 2.2 MG/DL (ref 1.8–2.4)
MCH RBC QN AUTO: 30.7 PG (ref 26–34)
MCHC RBC AUTO-ENTMCNC: 32.6 G/DL (ref 31–36)
MCV RBC AUTO: 94.4 FL (ref 80–100)
MONOCYTES ABSOLUTE: 1.3 K/UL (ref 0–1.3)
MONOCYTES RELATIVE PERCENT: 11.4 %
NEUTROPHILS ABSOLUTE: 8.5 K/UL (ref 1.7–7.7)
NEUTROPHILS RELATIVE PERCENT: 74.4 %
PDW BLD-RTO: 13.9 % (ref 12.4–15.4)
PH VENOUS: 7.42 (ref 7.35–7.45)
PLATELET # BLD: 239 K/UL (ref 135–450)
PMV BLD AUTO: 8.3 FL (ref 5–10.5)
POTASSIUM REFLEX MAGNESIUM: 4 MMOL/L (ref 3.5–5.1)
POTASSIUM REFLEX MAGNESIUM: 4.1 MMOL/L (ref 3.5–5.1)
RBC # BLD: 4.32 M/UL (ref 4.2–5.9)
SODIUM BLD-SCNC: 134 MMOL/L (ref 136–145)
SODIUM BLD-SCNC: 136 MMOL/L (ref 136–145)
WBC # BLD: 11.5 K/UL (ref 4–11)

## 2023-03-12 PROCEDURE — 6370000000 HC RX 637 (ALT 250 FOR IP): Performed by: NURSE PRACTITIONER

## 2023-03-12 PROCEDURE — 2100000000 HC CCU R&B

## 2023-03-12 PROCEDURE — 2500000003 HC RX 250 WO HCPCS: Performed by: ANESTHESIOLOGY

## 2023-03-12 PROCEDURE — 83735 ASSAY OF MAGNESIUM: CPT

## 2023-03-12 PROCEDURE — 82330 ASSAY OF CALCIUM: CPT

## 2023-03-12 PROCEDURE — 6370000000 HC RX 637 (ALT 250 FOR IP): Performed by: INTERNAL MEDICINE

## 2023-03-12 PROCEDURE — 93010 ELECTROCARDIOGRAM REPORT: CPT | Performed by: INTERNAL MEDICINE

## 2023-03-12 PROCEDURE — 80048 BASIC METABOLIC PNL TOTAL CA: CPT

## 2023-03-12 PROCEDURE — 6370000000 HC RX 637 (ALT 250 FOR IP): Performed by: ANESTHESIOLOGY

## 2023-03-12 PROCEDURE — 2580000003 HC RX 258: Performed by: NURSE PRACTITIONER

## 2023-03-12 PROCEDURE — 99291 CRITICAL CARE FIRST HOUR: CPT | Performed by: STUDENT IN AN ORGANIZED HEALTH CARE EDUCATION/TRAINING PROGRAM

## 2023-03-12 PROCEDURE — 85025 COMPLETE CBC W/AUTO DIFF WBC: CPT

## 2023-03-12 PROCEDURE — 83605 ASSAY OF LACTIC ACID: CPT

## 2023-03-12 PROCEDURE — 36415 COLL VENOUS BLD VENIPUNCTURE: CPT

## 2023-03-12 PROCEDURE — 93005 ELECTROCARDIOGRAM TRACING: CPT | Performed by: INTERNAL MEDICINE

## 2023-03-12 PROCEDURE — 99233 SBSQ HOSP IP/OBS HIGH 50: CPT | Performed by: INTERNAL MEDICINE

## 2023-03-12 PROCEDURE — 85520 HEPARIN ASSAY: CPT

## 2023-03-12 PROCEDURE — 2000000000 HC ICU R&B

## 2023-03-12 RX ORDER — METOPROLOL SUCCINATE 50 MG/1
50 TABLET, EXTENDED RELEASE ORAL 2 TIMES DAILY
Status: DISCONTINUED | OUTPATIENT
Start: 2023-03-12 | End: 2023-03-14 | Stop reason: HOSPADM

## 2023-03-12 RX ORDER — AMIODARONE HYDROCHLORIDE 200 MG/1
200 TABLET ORAL 2 TIMES DAILY
Status: DISCONTINUED | OUTPATIENT
Start: 2023-03-12 | End: 2023-03-14 | Stop reason: HOSPADM

## 2023-03-12 RX ADMIN — APIXABAN 5 MG: 5 TABLET, FILM COATED ORAL at 13:06

## 2023-03-12 RX ADMIN — FAMOTIDINE 20 MG: 10 INJECTION, SOLUTION INTRAVENOUS at 08:11

## 2023-03-12 RX ADMIN — Medication 10 ML: at 08:11

## 2023-03-12 RX ADMIN — APIXABAN 5 MG: 5 TABLET, FILM COATED ORAL at 21:02

## 2023-03-12 RX ADMIN — METOPROLOL SUCCINATE 50 MG: 50 TABLET, EXTENDED RELEASE ORAL at 21:02

## 2023-03-12 RX ADMIN — SACUBITRIL AND VALSARTAN 1 TABLET: 24; 26 TABLET, FILM COATED ORAL at 21:03

## 2023-03-12 RX ADMIN — ASPIRIN 81 MG 81 MG: 81 TABLET ORAL at 08:10

## 2023-03-12 RX ADMIN — Medication 15 ML: at 08:12

## 2023-03-12 RX ADMIN — METOPROLOL SUCCINATE 50 MG: 50 TABLET, EXTENDED RELEASE ORAL at 08:10

## 2023-03-12 RX ADMIN — ATORVASTATIN CALCIUM 40 MG: 40 TABLET, FILM COATED ORAL at 21:02

## 2023-03-12 RX ADMIN — CLOPIDOGREL BISULFATE 75 MG: 75 TABLET ORAL at 08:11

## 2023-03-12 RX ADMIN — AMIODARONE HYDROCHLORIDE 200 MG: 200 TABLET ORAL at 21:02

## 2023-03-12 RX ADMIN — SACUBITRIL AND VALSARTAN 1 TABLET: 24; 26 TABLET, FILM COATED ORAL at 13:07

## 2023-03-12 RX ADMIN — SPIRONOLACTONE 25 MG: 25 TABLET, FILM COATED ORAL at 08:11

## 2023-03-12 RX ADMIN — AMIODARONE HYDROCHLORIDE 400 MG: 200 TABLET ORAL at 08:11

## 2023-03-12 RX ADMIN — Medication 10 ML: at 21:03

## 2023-03-12 ASSESSMENT — PAIN SCALES - GENERAL
PAINLEVEL_OUTOF10: 0
PAINLEVEL_OUTOF10: 0

## 2023-03-12 NOTE — PLAN OF CARE
Problem: Pain  Goal: Verbalizes/displays adequate comfort level or baseline comfort level  3/12/2023 1017 by Marry Blackwell RN  Outcome: Progressing  3/12/2023 0450 by Josephine Acosta RN  Outcome: Progressing     Problem: Safety - Adult  Goal: Free from fall injury  3/12/2023 1017 by Marry Blackwell RN  Outcome: Progressing  Flowsheets (Taken 3/12/2023 1017)  Free From Fall Injury: Instruct family/caregiver on patient safety  3/12/2023 0450 by Josephine Acosta RN  Outcome: Progressing

## 2023-03-12 NOTE — PROGRESS NOTES
[facility]   Progress Note  Cardiology    CC: SOB    HPI: feels well today. Impella removed yesterday    Past Medical History   has no past medical history on file. Past Surgical History   has no past surgical history on file. Social History   reports that he has been smoking cigarettes. He has never used smokeless tobacco. He reports current alcohol use. He reports that he does not currently use drugs. Family History  family history is not on file. Medications  Prior to Admission medications    Not on File       Allergies  Patient has no known allergies. Review of Systems:   Reviewed. No changes except as noted in HPI and A/P      Lab Results   Component Value Date    WBC 11.5 (H) 03/12/2023    HGB 13.3 (L) 03/12/2023    HCT 40.8 03/12/2023    MCV 94.4 03/12/2023     03/12/2023     Lab Results   Component Value Date    CREATININE 0.8 03/12/2023    BUN 14 03/12/2023     03/12/2023    K 4.0 03/12/2023     03/12/2023    CO2 22 03/12/2023     Lab Results   Component Value Date    INR 1.27 (H) 03/10/2023    PROTIME 15.8 (H) 03/10/2023       I reviewed EKGs and radiology imaging. Pertinent findings and changes as described in assessment below.       Physical Examination:    /73   Pulse 77   Temp 99.6 °F (37.6 °C) (Bladder)   Resp 22   Ht 6' 2\" (1.88 m)   Wt 209 lb 7 oz (95 kg)   SpO2 99%   BMI 26.89 kg/m²      General Appearance:  appears stated age   Head:  Normocephalic, without obvious abnormality, atraumatic   Eyes:  PERRL, conjunctiva/corneas clear         Nose: Nares normal, no drainage or sinus tenderness   Throat: ETT in place   Neck: symmetrical, trachea midline,          Lungs:   clear to auscultation bilaterally    Chest Wall:  No deformity   Heart:  Regular rate and rhythm, S1, S2 normal, no murmur, rub or gallop   Abdomen:   Soft, decreased bowel sounds                Extremities: No cyanosis or edema   Pulses: 2+ and symmetric   Skin: Skin color, texture, turgor normal, no rashes    Pysch: Normal mood and affect   Neurologic: nonfocal     Reviewed: BMP, CBC, anti-XA  Consultation w/ pulmonologist     Assessment:    Severe CMP, EF 20%. New   Severe CAD - s/p PCI 3/10/2023 w/ Impella. stable  PAF - new  Acute systolic heart failure - improved  Hypertension - stable  Mitral regurgitation  Pulmonary hypertension  Current smoker - cessation advised   Alcohol use    Plan  Stop heparin  Start Eliquis - R/B/A/E discussed.  Monitor CBC for toxicity   Start Entresto - monitor BMP for toxicity   DAPT, statin, BBlk    Karlie Eugene MD, 3/12/2023 11:39 AM

## 2023-03-12 NOTE — PROGRESS NOTES
Hospitalist Progress Note      PCP: No primary care provider on file. Date of Admission: 3/3/2023    Chief Complaint: Palpitations and Shortness of Breath    Hospital Course: This is a 61-year-old male with a past medical history of alcohol abuse, tobacco abuse newly diagnosed heart failure with reduced EF admitted with shortness of breath and palpitation intubated and sedated unable to give any history according to the EMR patient with a chief complaint of shortness of breath fatigue dyspnea on exertion for 3 weeks on admission found to be A-fib with RVR echo showing reduced EF of 15 to 20% patient had a SUSI and cardioversion currently in normal sinus rhythm, left heart catheterization with 6 showed severe multivessel disease Impella removal on 3/10/2023 cardiology, pulmonary critical care consulted and following.     Subjective: Patient is extubated on room air, complains of a diarrhea      Medications:  Reviewed    Infusion Medications    heparin (porcine) 1,630 Units/hr (03/11/23 1927)    sodium chloride 25 mL/hr at 03/10/23 1830    propofol Stopped (03/11/23 1520)    DOPamine 2.5 mcg/kg/min (03/10/23 1940)    heparin (Impella Purge) solution Stopped (03/11/23 1300)     Scheduled Medications    metoprolol succinate  50 mg Oral BID    sodium chloride flush  5-40 mL IntraVENous 2 times per day    chlorhexidine  15 mL Mouth/Throat BID    famotidine (PEPCID) injection  20 mg IntraVENous BID    aspirin  81 mg Oral Daily    atorvastatin  40 mg Oral Nightly    clopidogrel  75 mg Oral Daily    [Held by provider] furosemide  40 mg IntraVENous TID    spironolactone  25 mg Oral Daily    amiodarone  400 mg Oral BID    nicotine  1 patch TransDERmal Daily     PRN Meds: sodium chloride flush, sodium chloride, fentanNYL, acetaminophen, heparin (porcine), heparin (porcine), prochlorperazine, polyethylene glycol, acetaminophen **OR** acetaminophen, perflutren lipid microspheres      Intake/Output Summary (Last 24 hours) at 3/12/2023 0943  Last data filed at 3/12/2023 0800  Gross per 24 hour   Intake 1071.33 ml   Output 1765 ml   Net -693.67 ml       Physical Exam Performed:    /73   Pulse 67   Temp 99.6 °F (37.6 °C) (Bladder)   Resp 17   Ht 6' 2\" (1.88 m)   Wt 209 lb 7 oz (95 kg)   SpO2 96%   BMI 26.89 kg/m²     General appearance: No apparent distress, appears stated age and cooperative. HEENT: Pupils equal, round, and reactive to light. Conjunctivae/corneas clear. Neck: Supple, with full range of motion. No jugular venous distention. Trachea midline. Respiratory:  Normal respiratory effort. Clear to auscultation, bilaterally without Rales/Wheezes/Rhonchi. Cardiovascular: Regular rate and rhythm with normal S1/S2 without murmurs, rubs or gallops. Abdomen: Soft, non-tender, non-distended with normal bowel sounds. Musculoskeletal: No clubbing, cyanosis or edema bilaterally. Full range of motion without deformity. Skin: Skin color, texture, turgor normal.  No rashes or lesions. Neurologic:  Neurovascularly intact without any focal sensory/motor deficits. Cranial nerves: II-XII intact, grossly non-focal.  Psychiatric: Alert and oriented, thought content appropriate, normal insight  Capillary Refill: Brisk, 3 seconds, normal   Peripheral Pulses: +2 palpable, equal bilaterally       Labs:   Recent Labs     03/10/23  1538 03/11/23  0223 03/12/23  0427   WBC 6.2 9.5 11.5*   HGB 13.1* 13.9 13.3*   HCT 39.2* 42.1 40.8    262 239     Recent Labs     03/10/23  1538 03/11/23  0223 03/12/23  0135 03/12/23  0427   * 137 134* 136   K 4.0 4.0 4.1 4.0    105 103 103   CO2 23 21 23 22   BUN 18 17 13 14   CREATININE 0.8 0.8 1.0 0.8   CALCIUM 8.5 9.0 9.1 9.2   PHOS 4.4  --   --   --      No results for input(s): AST, ALT, BILIDIR, BILITOT, ALKPHOS in the last 72 hours. Recent Labs     03/10/23  1849   INR 1.27*     No results for input(s): Елена Jenkins in the last 72 hours.     Urinalysis:    No results found for: Kristine Zbigniew, BACTERIA, RBCUA, BLOODU, [address], [address]    Radiology:  XR ABDOMEN FOR NG/OG/NE TUBE PLACEMENT   Final Result   Enteric tube tip and side-port in the proximal stomach below the   gastroesophageal junction. XR CHEST PORTABLE   Final Result   No significant change from prior exam.  Impella unchanged terminating in the   region of the left ventricle. XR CHEST PORTABLE   Final Result   Status post ET tube placement in good position with slight decrease in the   heart size and slowly resolving central pulmonary congestion         XR CHEST PORTABLE   Final Result   No acute cardiopulmonary findings         XR CHEST PORTABLE   Final Result   1. Cardiomegaly with bilateral lung infiltrates and bilateral pleural   effusions, likely related to pulmonary edema. Superimposed pneumonia cannot   be excluded. IP CONSULT TO CARDIOLOGY  IP CONSULT TO PULMONOLOGY  IP CONSULT TO DIETITIAN  IP CONSULT TO CRITICAL CARE    Assessment/Plan:    Active Hospital Problems    Diagnosis     Atrial fibrillation with RVR ([facility] [address] [I48.91]      Priority: Medium    Coronary artery disease involving native coronary artery of native heart with unstable angina pectoris ([facility] [I25.110]      Priority: Medium    Acute respiratory failure with hypoxia ([facility] [J96.01]      Priority: Medium    Acute pulmonary edema (HCC) [J81.0]      Priority: Medium    Mitral regurgitation [I34.0]      Priority: Medium    Biatrial enlargement [I51.7]      Priority: Medium    Pulmonary hypertension ([facility] [address] [I27.20]      Priority: Medium    Overweight (BMI 25.0-29. 9) [E66.3]      Priority: Medium    Current smoker [F17.200]      Priority: Medium    Alcohol use [Z78.9]      Priority: Medium    Acute systolic heart failure (HCC) [I50.21]      Priority: Medium    Cardiomyopathy ([facility] [address] [I42.9]      Priority: Medium    Uncontrolled hypertension [I10]      Priority: Medium    Atrial fibrillation with rapid ventricular response ([facility] [I48.91]      Priority: Medium      1. This is a 27-year-old male admitted with shortness of breath atrial fibrillation with RVR cardiology consulted status post cardiac cath with severe multivessel coronary artery disease, ischemic cardiomyopathy cardiology consulted and following. 2.   Patient was extubated on 3/11/2023 remains on room air continue with inhalers pulmonary critical care consult appreciated. 3.  Tobacco abuse continue with the nicotine patch. Recommend patient to stop smoking  4. Hyperlipidemia on statin. 5.   Hypertension continue with current medication.     DVT Prophylaxis: Heparin drip  Diet: Diet NPO Exceptions are: Sips of Water with Meds  Code Status: Full Code  PT/OT Eval Status:     Dispo -     Appropriate for A1 Discharge Unit: Jaki Macdonald MD

## 2023-03-12 NOTE — PLAN OF CARE
Problem: Discharge Planning  Goal: Discharge to home or other facility with appropriate resources  Outcome: Progressing  Flowsheets (Taken 3/11/2023 2000)  Discharge to home or other facility with appropriate resources: Identify barriers to discharge with patient and caregiver     Problem: Chronic Conditions and Co-morbidities  Goal: Patient's chronic conditions and co-morbidity symptoms are monitored and maintained or improved  Outcome: Progressing  Flowsheets (Taken 3/11/2023 2000)  Care Plan - Patient's Chronic Conditions and Co-Morbidity Symptoms are Monitored and Maintained or Improved: Monitor and assess patient's chronic conditions and comorbid symptoms for stability, deterioration, or improvement     Problem: Pain  Goal: Verbalizes/displays adequate comfort level or baseline comfort level  Outcome: Progressing     Problem: Safety - Adult  Goal: Free from fall injury  Outcome: Progressing     Problem: ABCDS Injury Assessment  Goal: Absence of physical injury  Outcome: Progressing

## 2023-03-12 NOTE — CONSULTS
Pharmacy to Manage Heparin Infusion per [facility]    Dx: Afib  Pt wt = _96.4kg_ (will use adjusted wt if actual body weight > 120% ideal body weight). Oral factor Xa-inhibitors may alter and elevate anti-Xa levels used for unfractionated heparin monitoring. As a result, anti-Xa monitoring is not accurate while Xa-inhibitor activity is detectable. Utilize aPTT monitoring when patient received an oral factor Xa-inhibitor (apixaban, betrixaban, edoxaban or rivaroxaban) within 72 hours prior to admission (please document last administration time). The goal is to allow a washout of oral factor Xa-inhibitors by using aPTT for 72 hours, then change to ant-Xa levels for UFH. Heparin (weight-based) Infusion: CAD/STEMI/NSTEMI/UA/AFib)   Heparin infusion at 12 units/kg/hr (recommended max initial rate: 1000 units/hr). Recheck anti-Xa (unless aPTT being used) in 6 hours. Goal anti-Xa 0.3-0.7 IU/mL  Goal aPTT =  seconds.   Alfonso Payan[address]  3/11/2023 at 8:04 PM    ----------------------------  3/12 0159  Low-Dose Heparin Drip  Current Rate: 1630 units/hr  3/12 @ 0135 Anti-Xa Level= 0.37  Plan: Per pharmacy dosing, we will not make any changes at this time    Next Anti-Xa Level: 3/12 @ 0900 (daylight savings)  Margo Cedillo PharmD 3/12/2023 1:57 AM    --------------------------------    3/12/2023  anti-Xa level = 0.45 IU/mL at 0835  Continue Heparin infusion rate at 1630 units/hr   Daily level ordered  Next anti-Xa ordered for tomorrow (3/13) SELAM Byrd PharmD  3/12/2023 9:22 AM

## 2023-03-12 NOTE — PROGRESS NOTES
Pulmonary & Critical Care Medicine ICU Progress Note      Events of Last 24 hours:   Pt with no complaints this morning. He has cough with productive clear sputum. Invasive Lines: PICC D#None   CVC D#None  Art Line D#None            Vitals:  /70   Pulse 59   Temp 99.6 °F (37.6 °C) (Bladder)   Resp 17   Ht 6' 2\" (1.88 m)   Wt 209 lb 7 oz (95 kg)   SpO2 98%   BMI 26.89 kg/m²    Tmax:  CVP:        Intake/Output Summary (Last 24 hours) at 3/12/2023 1102  Last data filed at 3/12/2023 0800  Gross per 24 hour   Intake 1071.33 ml   Output 1765 ml   Net -693.67 ml       EXAM:  Physical Exam  Constitutional:       General: He is not in acute distress. Appearance: He is not toxic-appearing. HENT:      Head: Normocephalic and atraumatic. Nose: Nose normal.      Mouth/Throat:      Pharynx: No oropharyngeal exudate. Eyes:      General: No scleral icterus. Right eye: No discharge. Left eye: No discharge. Cardiovascular:      Rate and Rhythm: Normal rate and regular rhythm. Heart sounds: No murmur heard. No friction rub. No gallop. Pulmonary:      Effort: Pulmonary effort is normal. No respiratory distress. Breath sounds: No wheezing or rales. Abdominal:      General: Abdomen is flat. Bowel sounds are normal.      Palpations: Abdomen is soft. Musculoskeletal:         General: No swelling. Normal range of motion. Cervical back: Normal range of motion. Skin:     General: Skin is warm and dry. Neurological:      General: No focal deficit present. Mental Status: He is alert and oriented to person, place, and time.    Psychiatric:         Mood and Affect: Mood normal.        Medications:  Scheduled Meds:   metoprolol succinate  50 mg Oral BID    sodium chloride flush  5-40 mL IntraVENous 2 times per day    chlorhexidine  15 mL Mouth/Throat BID    famotidine (PEPCID) injection  20 mg IntraVENous BID    aspirin  81 mg Oral Daily    atorvastatin  40 mg Oral Nightly    clopidogrel  75 mg Oral Daily    [Held by provider] furosemide  40 mg IntraVENous TID    spironolactone  25 mg Oral Daily    amiodarone  400 mg Oral BID    nicotine  1 patch TransDERmal Daily       PRN Meds:  sodium chloride flush, sodium chloride, fentanNYL, acetaminophen, heparin (porcine), heparin (porcine), prochlorperazine, polyethylene glycol, acetaminophen **OR** acetaminophen, perflutren lipid microspheres    Results:  CBC:   Recent Labs     03/10/23  1538 03/11/23 0223 03/12/23 0427   WBC 6.2 9.5 11.5*   HGB 13.1* 13.9 13.3*   HCT 39.2* 42.1 40.8   MCV 93.3 94.2 94.4    262 239     BMP:   Recent Labs     03/10/23  1538 03/11/23 0223 03/12/23  0135 03/12/23 0427   * 137 134* 136   K 4.0 4.0 4.1 4.0    105 103 103   CO2 23 21 23 22   PHOS 4.4  --   --   --    BUN 18 17 13 14   CREATININE 0.8 0.8 1.0 0.8     LIVER PROFILE: No results for input(s): AST, ALT, LIPASE, BILIDIR, BILITOT, ALKPHOS in the last 72 hours. Invalid input(s): AMYLASE,  ALB  PT/INR:   Recent Labs     03/10/23  1849   PROTIME 15.8*   INR 1.27*     APTT:   Recent Labs     03/11/23  0930 03/11/23  1218 03/11/23  1716   APTT 63.2* 66.5* 31.1     UA:No results for input(s): NITRITE, COLORU, PHUR, LABCAST, WBCUA, RBCUA, MUCUS, TRICHOMONAS, YEAST, BACTERIA, CLARITYU, SPECGRAV, LEUKOCYTESUR, UROBILINOGEN, BILIRUBINUR, BLOODU, GLUCOSEU, AMORPHOUS in the last 72 hours. Invalid input(s): KETONESU    Cultures:  None    Films:  I have reviewed radiology images personally. XR CHEST PORTABLE     Result Date: 3/10/2023  No significant change from prior exam.  Impella unchanged terminating in the region of the left ventricle.       XR CHEST PORTABLE     Result Date: 3/10/2023  Status post ET tube placement in good position with slight decrease in the heart size and slowly resolving central pulmonary congestion      XR ABDOMEN FOR NG/OG/NE TUBE PLACEMENT     Result Date: 3/10/2023  Enteric tube tip and side-port in the proximal stomach below the gastroesophageal junction. Assessment:  Severe Multivessel CAD and ischemic cardiomyopathy s/p Impella placement  HFrEF  A-fib  Acute Respiratory Failure  Tobacco dependence  EtOH abuse     Plan:   - Extubated yesterday, pt AOx3  - No pressors required, keep goal MAP > 65 or SBP > 90, normotensive this morning  - cont supplemental O2 PRN - currently saturating well on RA  - Restarted on low sodium heart diet  - Spiked fever yesterday, hold off on abx for now. If spiking again, would get cultures. - Replace electrolytes PRN  -Keep patient net negative  - SSl for hyperglycemia with goal 140-180  - Heparin gtt for DVT ppx/A-fib    Critical Care will sign off, please call with any questions/concerns. Pt can be transferred out of unit to tele. Ppx/Alvarenga/Lines  - PIV  - Discontinue alvarenga  - No GI ppx, Heparin gtt    Critical care time spent reviewing labs/films, examining patient, collaborating with other physicians but excluding procedures for life threatening organ failure is 39 minutes.       Electronically signed by:  Loy Christensen MD    3/12/2023    11:02 AM.

## 2023-03-12 NOTE — PROGRESS NOTES
Shift: 1700 - 0700    Admitting diagnosis: A fib RVR    Presentation to hospital: palpitations and SOB    Surgery: No    Nursing assessment at handoff  stable          Most recent vitals: /69   Pulse 66   Temp 98.7 °F (37.1 °C) (Oral)   Resp 20   Ht 6' 2\" (1.88 m)   Wt 209 lb 7 oz (95 kg)   SpO2 96%   BMI 26.89 kg/m²      Rhythm: Normal Sinus Rhythm, Afib      Respiratory support: - No ventilator support      Increased O2 requirements: no    Admission weight Weight: 235 lb (106.6 kg)  Today's weight   Wt Readings from Last 1 Encounters:   03/12/23 209 lb 7 oz (95 kg)         UOP >30ml/hr: yes    Velazquez need assessed each shift: yes    Restraints: no  Order current and documentation up to date? Lines/Drains  LDA Insertion Date Discontinued Date Dressing Changes   PIV       TLC       Arterial       Velazquez       Vas Cath      ETT       Surgical drains        Night Shift Hospitalist Interventions    Problem(Brief) Date Time Intervention Physician contacted                                               Drip rates at handoff:    heparin (porcine) 1,630 Units/hr (03/11/23 1927)    sodium chloride 25 mL/hr at 03/10/23 1830    propofol Stopped (03/11/23 1520)    DOPamine 2.5 mcg/kg/min (03/10/23 1940)    heparin (Impella Purge) solution Stopped (03/11/23 1300)       Hospital Course Daily Updates:  Admit Day 3/11/23   - Patient reverted to A fib for 5-10 minutes and returned to NSR.   - Patient having couplet PVCs. Provider aware. Lab Data:   CBC:   Recent Labs     03/11/23  0223 03/12/23  0427   WBC 9.5 11.5*   HGB 13.9 13.3*   HCT 42.1 40.8   MCV 94.2 94.4    239     BMP:    Recent Labs     03/12/23  0135 03/12/23  0427   * 136   K 4.1 4.0   CO2 23 22   BUN 13 14   CREATININE 1.0 0.8     LIVR: No results for input(s): AST, ALT in the last 72 hours.   PT/INR:   Recent Labs     03/10/23  1849   INR 1.27*     APTT:   Recent Labs     03/11/23  1218 03/11/23  1716   APTT 66.5* 31.1     ABG: Recent Labs     03/11/23  0405   PHART 7.423   HIO9UPW 31.1*   PO2ART 126.8*

## 2023-03-13 LAB
ANION GAP SERPL CALCULATED.3IONS-SCNC: 8 MMOL/L (ref 3–16)
BUN BLDV-MCNC: 12 MG/DL (ref 7–20)
CALCIUM SERPL-MCNC: 9.4 MG/DL (ref 8.3–10.6)
CHLORIDE BLD-SCNC: 106 MMOL/L (ref 99–110)
CO2: 24 MMOL/L (ref 21–32)
CREAT SERPL-MCNC: 0.9 MG/DL (ref 0.8–1.3)
GFR SERPL CREATININE-BSD FRML MDRD: >60 ML/MIN/{1.73_M2}
GLUCOSE BLD-MCNC: 101 MG/DL (ref 70–99)
POTASSIUM REFLEX MAGNESIUM: 4.3 MMOL/L (ref 3.5–5.1)
SODIUM BLD-SCNC: 138 MMOL/L (ref 136–145)

## 2023-03-13 PROCEDURE — 1200000000 HC SEMI PRIVATE

## 2023-03-13 PROCEDURE — 2580000003 HC RX 258: Performed by: NURSE PRACTITIONER

## 2023-03-13 PROCEDURE — 6370000000 HC RX 637 (ALT 250 FOR IP): Performed by: INTERNAL MEDICINE

## 2023-03-13 PROCEDURE — 6370000000 HC RX 637 (ALT 250 FOR IP): Performed by: NURSE PRACTITIONER

## 2023-03-13 PROCEDURE — 36415 COLL VENOUS BLD VENIPUNCTURE: CPT

## 2023-03-13 PROCEDURE — 2060000000 HC ICU INTERMEDIATE R&B

## 2023-03-13 PROCEDURE — 80048 BASIC METABOLIC PNL TOTAL CA: CPT

## 2023-03-13 RX ADMIN — APIXABAN 5 MG: 5 TABLET, FILM COATED ORAL at 08:18

## 2023-03-13 RX ADMIN — APIXABAN 5 MG: 5 TABLET, FILM COATED ORAL at 19:59

## 2023-03-13 RX ADMIN — METOPROLOL SUCCINATE 50 MG: 50 TABLET, EXTENDED RELEASE ORAL at 19:59

## 2023-03-13 RX ADMIN — Medication 10 ML: at 08:19

## 2023-03-13 RX ADMIN — SACUBITRIL AND VALSARTAN 1 TABLET: 24; 26 TABLET, FILM COATED ORAL at 20:00

## 2023-03-13 RX ADMIN — AMIODARONE HYDROCHLORIDE 200 MG: 200 TABLET ORAL at 08:18

## 2023-03-13 RX ADMIN — AMIODARONE HYDROCHLORIDE 200 MG: 200 TABLET ORAL at 20:00

## 2023-03-13 RX ADMIN — SACUBITRIL AND VALSARTAN 1 TABLET: 24; 26 TABLET, FILM COATED ORAL at 08:31

## 2023-03-13 RX ADMIN — SPIRONOLACTONE 25 MG: 25 TABLET, FILM COATED ORAL at 08:14

## 2023-03-13 RX ADMIN — Medication 10 ML: at 20:02

## 2023-03-13 RX ADMIN — CLOPIDOGREL BISULFATE 75 MG: 75 TABLET ORAL at 08:18

## 2023-03-13 RX ADMIN — ATORVASTATIN CALCIUM 40 MG: 40 TABLET, FILM COATED ORAL at 19:59

## 2023-03-13 RX ADMIN — ASPIRIN 81 MG 81 MG: 81 TABLET ORAL at 08:18

## 2023-03-13 ASSESSMENT — PAIN SCALES - GENERAL
PAINLEVEL_OUTOF10: 0
PAINLEVEL_OUTOF10: 0

## 2023-03-13 ASSESSMENT — PAIN SCALES - WONG BAKER
WONGBAKER_NUMERICALRESPONSE: 0
WONGBAKER_NUMERICALRESPONSE: 0

## 2023-03-13 NOTE — PLAN OF CARE
Problem: Pain  Goal: Verbalizes/displays adequate comfort level or baseline comfort level  3/13/2023 1937 by Radha Sharpe RN  Outcome: Progressing  3/13/2023 0957 by Clau Griffin RN  Outcome: Progressing     Problem: Safety - Adult  Goal: Free from fall injury  3/13/2023 1937 by Radha Sharpe RN  Outcome: Progressing  3/13/2023 0957 by Clau Griffin RN  Outcome: Progressing     Problem: Skin/Tissue Integrity  Goal: Absence of new skin breakdown  Description: 1. Monitor for areas of redness and/or skin breakdown  2. Assess vascular access sites hourly  3. Every 4-6 hours minimum:  Change oxygen saturation probe site  4. Every 4-6 hours:  If on nasal continuous positive airway pressure, respiratory therapy assess nares and determine need for appliance change or resting period.   3/13/2023 0957 by Clau Griffin RN  Outcome: Progressing    Assessing skin every shift and turning as necessary

## 2023-03-13 NOTE — PLAN OF CARE
Problem: Discharge Planning  Goal: Discharge to home or other facility with appropriate resources  Outcome: Progressing  Flowsheets (Taken 3/12/2023 2000)  Discharge to home or other facility with appropriate resources:   Identify barriers to discharge with patient and caregiver   Arrange for needed discharge resources and transportation as appropriate   Identify discharge learning needs (meds, wound care, etc)   Refer to discharge planning if patient needs post-hospital services based on physician order or complex needs related to functional status, cognitive ability or social support system     Problem: Chronic Conditions and Co-morbidities  Goal: Patient's chronic conditions and co-morbidity symptoms are monitored and maintained or improved  Outcome: Progressing  Flowsheets (Taken 3/12/2023 2000)  Care Plan - Patient's Chronic Conditions and Co-Morbidity Symptoms are Monitored and Maintained or Improved:   Monitor and assess patient's chronic conditions and comorbid symptoms for stability, deterioration, or improvement   Collaborate with multidisciplinary team to address chronic and comorbid conditions and prevent exacerbation or deterioration   Update acute care plan with appropriate goals if chronic or comorbid symptoms are exacerbated and prevent overall improvement and discharge     Problem: Pain  Goal: Verbalizes/displays adequate comfort level or baseline comfort level  3/12/2023 2253 by Jose Juan Brooks RN  Outcome: Progressing  Flowsheets (Taken 3/12/2023 2000)  Verbalizes/displays adequate comfort level or baseline comfort level:   Encourage patient to monitor pain and request assistance   Assess pain using appropriate pain scale   Administer analgesics based on type and severity of pain and evaluate response   Implement non-pharmacological measures as appropriate and evaluate response   Consider cultural and social influences on pain and pain management   Notify Licensed Independent Practitioner if  interventions unsuccessful or patient reports new pain  3/12/2023 1017 by Shani Arnett RN  Outcome: Progressing     Problem: Skin/Tissue Integrity  Goal: Absence of new skin breakdown  Description: 1. Monitor for areas of redness and/or skin breakdown  2. Assess vascular access sites hourly  3. Every 4-6 hours minimum:  Change oxygen saturation probe site  4. Every 4-6 hours:  If on nasal continuous positive airway pressure, respiratory therapy assess nares and determine need for appliance change or resting period.   Outcome: Progressing

## 2023-03-13 NOTE — PROGRESS NOTES
Hospitalist Progress Note      PCP: No primary care provider on file. Date of Admission: 3/3/2023    Chief Complaint: Palpitations and Shortness of Breath    Hospital Course: This is a 70-year-old male with a past medical history of alcohol abuse, tobacco abuse newly diagnosed heart failure with reduced EF admitted with shortness of breath and palpitation intubated and sedated unable to give any history according to the EMR patient with a chief complaint of shortness of breath fatigue dyspnea on exertion for 3 weeks on admission found to be A-fib with RVR echo showing reduced EF of 15 to 20% patient had a SUSI and cardioversion currently in normal sinus rhythm, left heart catheterization with 6 showed severe multivessel disease Impella removal on 3/10/2023 cardiology, pulmonary critical care consulted and following. Subjective:     Patient doing well. Denies any chest pain. No shortness of breath. Patient states his diarrhea has improved. No fevers or chills. No palpitations.     Medications:  Reviewed    Infusion Medications    sodium chloride 25 mL/hr at 03/10/23 1830     Scheduled Medications    mupirocin   Each Nostril BID    metoprolol succinate  50 mg Oral BID    amiodarone  200 mg Oral BID    sacubitril-valsartan  1 tablet Oral BID    apixaban  5 mg Oral BID    sodium chloride flush  5-40 mL IntraVENous 2 times per day    atorvastatin  40 mg Oral Nightly    clopidogrel  75 mg Oral Daily    [Held by provider] furosemide  40 mg IntraVENous TID    spironolactone  25 mg Oral Daily    nicotine  1 patch TransDERmal Daily     PRN Meds: sodium chloride flush, sodium chloride, acetaminophen, prochlorperazine, polyethylene glycol, perflutren lipid microspheres      Intake/Output Summary (Last 24 hours) at 3/13/2023 1422  Last data filed at 3/13/2023 1333  Gross per 24 hour   Intake 680 ml   Output 1325 ml   Net -645 ml         Physical Exam Performed:    /77   Pulse 60   Temp 98.4 °F (36.9 °C) (Oral)   Resp 18   Ht 6' 2\" (1.88 m)   Wt 204 lb 12.9 oz (92.9 kg)   SpO2 100%   BMI 26.30 kg/m²     General appearance: No apparent distress, appears stated age and cooperative. HEENT: Pupils equal, round, and reactive to light. Conjunctivae/corneas clear. Neck: Supple, with full range of motion. No jugular venous distention. Trachea midline. Respiratory:  Normal respiratory effort. Clear to auscultation, bilaterally without Rales/Wheezes/Rhonchi. Cardiovascular: Regular rate and rhythm with normal S1/S2 without murmurs, rubs or gallops. Abdomen: Soft, non-tender, non-distended with normal bowel sounds. Musculoskeletal: No clubbing, cyanosis or edema bilaterally. Full range of motion without deformity. Skin: Skin color, texture, turgor normal.  No rashes or lesions. Neurologic:  Neurovascularly intact without any focal sensory/motor deficits. Cranial nerves: II-XII intact, grossly non-focal.  Psychiatric: Alert and oriented, thought content appropriate, normal insight  Capillary Refill: Brisk, 3 seconds, normal   Peripheral Pulses: +2 palpable, equal bilaterally       Labs:   Recent Labs     03/10/23  1538 03/11/23  0223 03/12/23  0427   WBC 6.2 9.5 11.5*   HGB 13.1* 13.9 13.3*   HCT 39.2* 42.1 40.8    262 239       Recent Labs     03/10/23  1538 03/11/23  0223 03/12/23  0135 03/12/23  0427 03/13/23  0337   *   < > 134* 136 138   K 4.0   < > 4.1 4.0 4.3      < > 103 103 106   CO2 23   < > 23 22 24   BUN 18   < > 13 14 12   CREATININE 0.8   < > 1.0 0.8 0.9   CALCIUM 8.5   < > 9.1 9.2 9.4   PHOS 4.4  --   --   --   --     < > = values in this interval not displayed. No results for input(s): AST, ALT, BILIDIR, BILITOT, ALKPHOS in the last 72 hours. Recent Labs     03/10/23  1849   INR 1.27*       No results for input(s): Lalla Ill in the last 72 hours.     Urinalysis:    No results found for: Dinah Babinski, BACTERIA, RBCUA, BLOODU, [address], [place] [address]    Radiology:  XR ABDOMEN FOR NG/OG/NE TUBE PLACEMENT   Final Result   Enteric tube tip and side-port in the proximal stomach below the   gastroesophageal junction. XR CHEST PORTABLE   Final Result   No significant change from prior exam.  Impella unchanged terminating in the   region of the left ventricle. XR CHEST PORTABLE   Final Result   Status post ET tube placement in good position with slight decrease in the   heart size and slowly resolving central pulmonary congestion         XR CHEST PORTABLE   Final Result   No acute cardiopulmonary findings         XR CHEST PORTABLE   Final Result   1. Cardiomegaly with bilateral lung infiltrates and bilateral pleural   effusions, likely related to pulmonary edema. Superimposed pneumonia cannot   be excluded. IP CONSULT TO CARDIOLOGY  IP CONSULT TO PULMONOLOGY  IP CONSULT TO CRITICAL CARE    Assessment/Plan:    Active Hospital Problems    Diagnosis     Atrial fibrillation with RVR ([facility] [I48.91]      Priority: Medium    Coronary artery disease involving native coronary artery of native heart with unstable angina pectoris ([facility] [I25.110]      Priority: Medium    Acute respiratory failure with hypoxia (HCC) [J96.01]      Priority: Medium    Acute pulmonary edema (HCC) [J81.0]      Priority: Medium    Mitral regurgitation [I34.0]      Priority: Medium    Biatrial enlargement [I51.7]      Priority: Medium    Pulmonary hypertension ([facility] [I27.20]      Priority: Medium    Overweight (BMI 25.0-29. 9) [E66.3]      Priority: Medium    Current smoker [F17.200]      Priority: Medium    Alcohol use [Z78.9]      Priority: Medium    Acute systolic heart failure (HCC) [I50.21]      Priority: Medium    Cardiomyopathy ([facility] [I42.9]      Priority: Medium    Uncontrolled hypertension [I10]      Priority: Medium    Atrial fibrillation with rapid ventricular response (HCC) [I48.91]      Priority: Medium      1. Atrial fibrillation with RVR. Cardiology following.   Patient status post cardiac cath. Patient also with reduced ejection fraction 15-20%. Patient remains in normal sinus rhythm. 2.   Acute hypoxic respiratory failure. Patient was extubated on 3/11/2023. Patient's respiratory status appears to be stable. He is not requiring oxygen  3. Tobacco abuse. Smoking cessation discussed with patient. 4.   Hyperlipidemia. Continue statin. 5.   Hypertension. Continue current home medications. Continue Entresto and metoprolol. DVT Prophylaxis: Heparin drip  Diet: ADULT DIET; Regular; Low Fat/Low Chol/High Fiber/JANET  Code Status: Full Code  PT/OT Eval Status: Patient ambulatory PT OT to evaluate patient    Dispo -possibly home in a.m if okay with cardiology.   Patient does not need a LifeVest per cardiology    Appropriate for A1 Discharge Unit: Jaki Sotelo MD

## 2023-03-13 NOTE — PLAN OF CARE
Problem: Discharge Planning  Goal: Discharge to home or other facility with appropriate resources  3/13/2023 0957 by Clau Griffin RN  Outcome: Progressing  3/12/2023 2253 by Jhonny Escalante RN  Outcome: Progressing  Flowsheets (Taken 3/12/2023 2000)  Discharge to home or other facility with appropriate resources:   Identify barriers to discharge with patient and caregiver   Arrange for needed discharge resources and transportation as appropriate   Identify discharge learning needs (meds, wound care, etc)   Refer to discharge planning if patient needs post-hospital services based on physician order or complex needs related to functional status, cognitive ability or social support system     Problem: Chronic Conditions and Co-morbidities  Goal: Patient's chronic conditions and co-morbidity symptoms are monitored and maintained or improved  3/13/2023 0957 by Clau Griffin RN  Outcome: Progressing  3/12/2023 2253 by Jhonny Escalante RN  Outcome: Progressing  Flowsheets (Taken 3/12/2023 2000)  Care Plan - Patient's Chronic Conditions and Co-Morbidity Symptoms are Monitored and Maintained or Improved:   Monitor and assess patient's chronic conditions and comorbid symptoms for stability, deterioration, or improvement   Collaborate with multidisciplinary team to address chronic and comorbid conditions and prevent exacerbation or deterioration   Update acute care plan with appropriate goals if chronic or comorbid symptoms are exacerbated and prevent overall improvement and discharge     Problem: Pain  Goal: Verbalizes/displays adequate comfort level or baseline comfort level  3/13/2023 0957 by Clau Griffin RN  Outcome: Progressing  3/12/2023 2253 by Jhonny Escalante RN  Outcome: Progressing  Flowsheets (Taken 3/12/2023 2000)  Verbalizes/displays adequate comfort level or baseline comfort level:   Encourage patient to monitor pain and request assistance   Assess pain using appropriate pain scale   Administer analgesics based on type and severity of pain and evaluate response   Implement non-pharmacological measures as appropriate and evaluate response   Consider cultural and social influences on pain and pain management   Notify Licensed Independent Practitioner if interventions unsuccessful or patient reports new pain     Problem: Safety - Adult  Goal: Free from fall injury  3/13/2023 0957 by Ilene Don RN  Outcome: Progressing  3/12/2023 2253 by Taylor Pro RN  Outcome: Progressing     Problem: ABCDS Injury Assessment  Goal: Absence of physical injury  3/13/2023 0957 by Ilene Dno RN  Outcome: Progressing  3/12/2023 2253 by Taylor Pro RN  Outcome: Progressing     Problem: Safety - Medical Restraint  Goal: Remains free of injury from restraints (Restraint for Interference with Medical Device)  Description: INTERVENTIONS:  1. Determine that other, less restrictive measures have been tried or would not be effective before applying the restraint  2. Evaluate the patient's condition at the time of restraint application  3. Inform patient/family regarding the reason for restraint  4. Q2H: Monitor safety, psychosocial status, comfort, nutrition and hydration  3/13/2023 0957 by Ilene Don RN  Outcome: Progressing  3/12/2023 2253 by Taylor Pro RN  Outcome: Progressing     Problem: Skin/Tissue Integrity  Goal: Absence of new skin breakdown  Description: 1. Monitor for areas of redness and/or skin breakdown  2. Assess vascular access sites hourly  3. Every 4-6 hours minimum:  Change oxygen saturation probe site  4. Every 4-6 hours:  If on nasal continuous positive airway pressure, respiratory therapy assess nares and determine need for appliance change or resting period.   3/13/2023 0957 by Ilene Don RN  Outcome: Progressing  3/12/2023 2253 by Taylor Pro RN  Outcome: Progressing

## 2023-03-13 NOTE — PROGRESS NOTES
[facility] - Daily Progress/Follow-up Note      Admit Date:  3/3/2023    CHIEF COMPLAINT  CAD, ischemic cardiomyopathy      INTERVAL HISTORY:  Mr. Julio Diaz who had complex PCI to LAD and diagonal for multivessel CAD and ischemic cardiomyopathy is moved to the floor this morning. He is doing very well without any chest pain or dyspnea on exertion. He has started to walk outside his room a little bit and is tolerating it very well he has no complaints whatsoever at this time. He is switched from IV heparin to Eliquis. He has had 2 DC cardioversions earlier this admission. He has flipped back into atrial fibrillation as of yesterday. Patient denies chest pain/heaviness/pressure, palpitations, dyspnea, orthopnea, edema, lightheadedness, syncope. REVIEW OF SYSTEMS  Constitutional: there has been no unanticipated weight loss. There's been no change in energy level, sleep pattern, or activity level. Eyes: No visual changes or diplopia. No scleral icterus. ENT: No Headaches, hearing loss or vertigo. No mouth sores or sore throat. Cardiovascular: Reviewed in HPI  Respiratory: No cough or wheezing, no sputum production. No hematemesis. Gastrointestinal: No abdominal pain, appetite loss, blood in stools. No change in bowel or bladder habits. Genitourinary: No dysuria, trouble voiding, or hematuria. Musculoskeletal:  No gait disturbance, weakness or joint complaints. Integumentary: No rash or pruritis. Neurological: No headache, diplopia, change in muscle strength, numbness or tingling. No change in gait, balance, coordination, mood, affect, memory, mentation, behavior. Psychiatric: No anxiety, no depression. Endocrine: No malaise, fatigue or temperature intolerance. No excessive thirst, fluid intake, or urination. No tremor. Hematologic/Lymphatic: No abnormal bruising or bleeding, blood clots or swollen lymph nodes. Allergic/Immunologic: No nasal congestion or hives.       CARDIAC MEDICATIONS  Amio 200 twice daily  Eliquis 5 twice daily  Aspirin 81  Lipitor 40  Plavix 75  Toprol-XL 50 twice daily  Entresto 24-26 twice daily  Spironolactone 25    Family, medical and social history reviewed and updated as necessary.     VITALS  /77   Pulse 60   Temp 98.4 °F (36.9 °C) (Oral)   Resp 18   Ht 6' 2\" (1.88 m)   Wt 204 lb 12.9 oz (92.9 kg)   SpO2 100%   BMI 26.30 kg/m²     Intake/Output Summary (Last 24 hours) at 3/13/2023 1316  Last data filed at 3/13/2023 1137  Gross per 24 hour   Intake 200 ml   Output 1325 ml   Net -1125 ml       TELEMETRY: No significant arrhythmias noted overnight    General appearance - alert, cooperative, no distress, appears stated age  Head - Normocephalic, without obvious abnormality, atraumatic  Eyes - PERRL, conjunctiva/corneas clear  Nose - Nares normal, no drainage or sinus tenderness  Throat - Lips, mucosa, and tongue normal  Neck - Supple, symmetrical, trachea midline, no adenopathy, thyroid: not enlarged, symmetric, no tenderness/mass/nodules, no carotid bruit or JVD  Lungs - Clear to auscultation bilaterally, respirations unlabored  Chest wall - No tenderness or deformity  Heart - Regular rate and rhythm, S1, S2 normal, no murmur, no rub or gallop  Abdomen - Soft, non-tender, bowel sounds active all four quadrants,  no masses, no organomegaly  Extremities - Extremities normal, atraumatic, no cyanosis or edema  Pulses - 2+ and symmetric upper and lower extremities  Skin - Skin color, texture, turgor normal, no rashes or lesions  Psych - Normal mood and affect  Neurologic - Normal gross motor and sensory exam.       LABS:  Recent Labs     03/10/23  1538 03/11/23 0223 03/12/23 0427   WBC 6.2 9.5 11.5*   HGB 13.1* 13.9 13.3*   HCT 39.2* 42.1 40.8   MCV 93.3 94.2 94.4    262 239     Recent Labs     03/10/23  1538 03/11/23  0223 03/12/23  0135 03/12/23 0427 03/13/23  0337   *   < > 134* 136 138   K 4.0   < > 4.1 4.0 4.3      < > 103 103 106   CO2 23   < > 23 22 24   PHOS 4.4  --   --   --   --    BUN 18   < > 13 14 12   CREATININE 0.8   < > 1.0 0.8 0.9    < > = values in this interval not displayed. Recent Labs     03/10/23  1849   PROTIME 15.8*   INR 1.27*     Recent Labs     03/11/23  0930 03/11/23  1218 03/11/23  1716   APTT 63.2* 66.5* 31.1     No results for input(s): BNP in the last 72 hours. CARDIAC STUDIES    ECG - sinus rhythm with frequent atrial ectopy. Possible old septal infarct    Echo - 3/3/23   Technically difficult study due to patient is a smoker and poor acoustic   window. Difficult to assess left ventricular systolic function due to arrhythmia but   appears severely reduced ef 15-20%. Severe global HK with regional variation. Mild concentric left ventricular hypertrophy. Left ventricular diastolic filling pressure is elevated. Mild to moderate mitral regurgitation. Moderate Bi-atrial enlargement. Right ventricular systolic function is mildly reduced . Mild tricuspid regurgitation. Systolic pulmonic artery pressure (SPAP) is estimated at 41 mmHg consistent   with mild pulmonary hypertension (Right atrial pressure of 8 mmHg). Stress test - n/a    [facility] - 3/10/23  Left main - 40% diffuse disease which is not significant by DFR (0.99) or IVUS (MLA > 10 mm²)  LAD - 80% diffuse stenosis in the midsegment treated successfully with PCI and placement of long drug-eluting stent with good final result. Subtotally occluded diagonal 2 was treated with POBA with acceptable result  Left circumflex - large OM1 branch is subtotally occluded with CRISTÓBAL I flow  RCA - not engaged  LVEDP - 3 mmHg at beginning of procedure and 15 mmHg at the end of the procedure    [facility] - 3/7/23  Left main - mild diffuse disease  LAD - 80% diffuse stenosis in the midsegment.   Small diagonal branch is subtotally occluded  Left circumflex - large OM1 branch is subtotally occluded with CRISTÓBAL I flow  RCA - large dominant vessel, mild luminal irregularities  LVEDP: 25    ASSESSMENT & PLAN    Ischemic cardiomyopathy - EF 20%, well compensated on exam.  Partial revascularization with Impella CP support as below. Repeat cath had LVEDP considerably improved compared to first cath. Complex multivessel coronary artery disease - status post LOLA to LAD and POBA to diagonal with Impella assist which was removed after 24 hours. No current symptoms of angina. Heart rate and BP at goal  Atrial fibrillation - status post 2 cardioversions earlier this admission, currently in sinus rhythm with frequent atrial ectopy. EP following. Currently on amiodarone 200 twice daily. On Eliquis    Stop aspirin 81 as long as he is on anticoagulation. We will maintain dual therapy with Plavix and Eliquis  Continue guideline directed medical therapy for CAD as well as cardiomyopathy  Repeat echo in 6 to 8 weeks. If EF remains low despite revascularization and GDMT, will initiate discussion regarding ICD with EP  PT evaluation for discharge  Should be ready for discharge tomorrow from a cardiac standpoint, we will touch base with the EP before discharge for any additional EP needs or changes  I will see him in office next week for close cardiology follow-up  Will also need EP follow-up        Thank you for the consult, please call with questions.     Claudell Sloop, MD, [place] [address] Cardiology  [facility]  [phone] (c)  3/13/2023       Inadvertent computerized transcription errors may be present

## 2023-03-13 NOTE — PROGRESS NOTES
Pt transferred from C2 234 to A2 216 with all belongings. Report from [employer]. [facility] aware. Pt to notify family of transfer.

## 2023-03-14 VITALS
TEMPERATURE: 98.2 F | HEIGHT: 74 IN | OXYGEN SATURATION: 98 % | WEIGHT: 196.4 LBS | BODY MASS INDEX: 25.21 KG/M2 | SYSTOLIC BLOOD PRESSURE: 116 MMHG | RESPIRATION RATE: 18 BRPM | HEART RATE: 56 BPM | DIASTOLIC BLOOD PRESSURE: 75 MMHG

## 2023-03-14 PROCEDURE — 6370000000 HC RX 637 (ALT 250 FOR IP): Performed by: INTERNAL MEDICINE

## 2023-03-14 PROCEDURE — 2580000003 HC RX 258: Performed by: NURSE PRACTITIONER

## 2023-03-14 PROCEDURE — 6370000000 HC RX 637 (ALT 250 FOR IP): Performed by: NURSE PRACTITIONER

## 2023-03-14 RX ORDER — CLOPIDOGREL BISULFATE 75 MG/1
75 TABLET ORAL DAILY
Qty: 30 TABLET | Refills: 3 | Status: SHIPPED | OUTPATIENT
Start: 2023-03-15

## 2023-03-14 RX ORDER — AMIODARONE HYDROCHLORIDE 200 MG/1
200 TABLET ORAL 2 TIMES DAILY
Qty: 60 TABLET | Refills: 2 | Status: SHIPPED | OUTPATIENT
Start: 2023-03-14

## 2023-03-14 RX ORDER — METOPROLOL SUCCINATE 50 MG/1
50 TABLET, EXTENDED RELEASE ORAL 2 TIMES DAILY
Qty: 30 TABLET | Refills: 3 | Status: SHIPPED | OUTPATIENT
Start: 2023-03-14

## 2023-03-14 RX ORDER — SPIRONOLACTONE 25 MG/1
25 TABLET ORAL DAILY
Qty: 30 TABLET | Refills: 3 | Status: SHIPPED | OUTPATIENT
Start: 2023-03-15

## 2023-03-14 RX ORDER — ATORVASTATIN CALCIUM 40 MG/1
40 TABLET, FILM COATED ORAL NIGHTLY
Qty: 30 TABLET | Refills: 3 | Status: SHIPPED | OUTPATIENT
Start: 2023-03-14

## 2023-03-14 RX ADMIN — SACUBITRIL AND VALSARTAN 1 TABLET: 24; 26 TABLET, FILM COATED ORAL at 08:45

## 2023-03-14 RX ADMIN — AMIODARONE HYDROCHLORIDE 200 MG: 200 TABLET ORAL at 08:45

## 2023-03-14 RX ADMIN — CLOPIDOGREL BISULFATE 75 MG: 75 TABLET ORAL at 08:45

## 2023-03-14 RX ADMIN — SPIRONOLACTONE 25 MG: 25 TABLET, FILM COATED ORAL at 08:45

## 2023-03-14 RX ADMIN — Medication 10 ML: at 08:47

## 2023-03-14 RX ADMIN — APIXABAN 5 MG: 5 TABLET, FILM COATED ORAL at 08:45

## 2023-03-14 NOTE — PLAN OF CARE
Patient's EF (Ejection Fraction) is less than 40%    Heart Failure Medications:  Diuretics[de-identified] Spironolactone    (One of the following REQUIRED for EF </= 40%/SYSTOLIC FAILURE but MAY be used in EF% >40%/DIASTOLIC FAILURE)        ACE[de-identified] None        ARB[de-identified] None         ARNI[de-identified] None- to be started at d/c    (Beta Blockers)  NON- Evidenced Based Beta Blocker (for EF% >40%/DIASTOLIC FAILURE): None    Evidenced Based Beta Blocker::(REQUIRED for EF% <40%/SYSTOLIC FAILURE) Metoprolol SUCCinate- Toprol XL  . .................................................................................................................................................. Patient's weights and intake/output reviewed: Yes    Patient's Last Weight: 199 lbs obtained by standing scale. Difference of 1 lbs more than last documented weight. Intake/Output Summary (Last 24 hours) at 3/14/2023 0454  Last data filed at 3/13/2023 1757  Gross per 24 hour   Intake 1200 ml   Output 300 ml   Net 900 ml         Daily Weight log at bedside and being used: [employer] Provided: yes    Comorbidities Reviewed Yes    Patient has no past medical history on file. >>For CHF and Comorbidity documentation on Education Time and Topics, please see Education Tab    Progressive Mobility Assessment:  What is this patient's Current Level of Mobility?: Ambulatory-Up Ad Astrid  How was this patient Mobilized today?: Edge of Bed, Up to Chair,  Up to Toilet/Shower, and Up in Room, ambulated 15 ft                 With Whom? Self                 Level of Difficulty/Assistance: Independent     Pt resting in bed at this time on room air. Pt denies shortness of breath. Pt without lower extremity edema.      Patient and/or Family's stated Goal of Care this Admission: increase activity tolerance, better understand heart failure and disease management, and be more comfortable prior to discharge        :

## 2023-03-14 NOTE — CARE COORDINATION
Spoke with MD who states patient should possibly be able to discharge today pending Cardiology signing off. Patient s/p cath and high risk PCI. MD does not anticipate that patient will have any needs. Patient is from home alone and independent at home. [facility] had provided PCP list and [address] last week. Spoke with patient at bedside for check in. He is denying any needs. He states he plans to follow up at the [facility]. His plan is to return to work on Monday the 20th.

## 2023-03-14 NOTE — ADT AUTH CERT
Heart Failure - Care Day 11 (3/13/2023) by Ping Carpenter RN       Review Status Review Entered   Completed 3/13/2023 1550       Created By   Ping Carpenter RN      Criteria Review      Care Day: 11 Care Date: 3/13/2023 Level of Care: Telemetry    Guideline Day 3    Clinical Status    (X) * Hemodynamic stability    3/13/2023 3:50 PM EDT by Kenneth Colón      /69 P 55    (X) * Tachypnea absent    3/13/2023 3:50 PM EDT by Kenneth Colón      RR 17    (X) * Oxygenation at baseline or acceptable for next level of care    3/13/2023 3:50 PM EDT by Kenneth Colón      On room air    (X) * Dyspnea at baseline or acceptable for next level of care    3/13/2023 3:50 PM EDT by Kenneth Colón      No shortness of breath. (X) * Cardiac rate and rhythm acceptable    3/13/2023 3:50 PM EDT by Kenneth Colón      Regular rate and rhythm with normal S1/S2 without murmurs, rubs or gallops.     (X) * Pulmonary edema absent or acceptable for next level of care    (X) * Peripheral or sacral edema absent or acceptable for next level of care    (X) * Mental status at baseline    3/13/2023 3:50 PM EDT by Kenneth Colón      Baseline    (X) * Volume status acceptable on oral medication    (X) * Renal function at baseline or acceptable for next level of care    3/13/2023 3:50 PM EDT by Kenneth Colón      [id number]/dL   Creatinine0.9mg/dL   Est, Glom Filt Rate>60    (X) * Electrolyte abnormalities absent or acceptable for next level of care    3/13/2023 3:50 PM EDT by Kennetholiver Colón      [id number]/L   Potassium reflex Magnesium4.3mmol/L   [id number]/L    (X) * Precipitating factors absent or controlled    ( ) * Discharge plans and education understood    Activity    ( ) * Ambulatory or acceptable for next level of care    3/13/2023 3:50 PM EDT by Kenneth Colón      Complete bedrest    Routes    (X) * Oral hydration    (X) * Oral medications or regimen acceptable for next level of care    (X) * Oral diet or acceptable for next level of care    3/13/2023 3:50 PM EDT by [employer]      ADULT DIET;  Regular; Low Fat/Low Chol/High Fiber/JANET    Interventions    (X) Weigh    3/13/2023 3:50 PM EDT by [employer]      Daily weights    Medications    (X) Diuretics    3/13/2023 3:50 PM EDT by [employer]      Lasix 40 mg IV TID- held    (X) Beta-blocker    3/13/2023 3:50 PM EDT by [employer]      (TOPROL XL) extended release tablet 50 mg PO BID    (X) Possible aldosterone antagonist    3/13/2023 3:50 PM EDT by [employer]      aldactone 25 mg PO QD       Definitions for Care Day 11    Hemodynamic stability    (X) Hemodynamic stability, as indicated by  1 or more  of the following :       (X) Hemodynamic abnormalities at baseline or acceptable for next level of care       (X) Patient hemodynamically stable, as indicated by  ALL  of the following  (1) (2) (3) (4) (5):          (X) Tachycardia absent          (X) Hypotension absent          (X) No evidence of inadequate perfusion (eg, no myocardial ischemia)          (X) No other hemodynamic abnormalities (eg, no Orthostatic hypotension)       Tachycardia absent    (X) Tachycardia absent, as indicated by  1 or more  of the following  (1) (2):       (X) Heart rate less than or equal to 100 beats per minute in adult or child 6 years or older       Hypotension absent    (X) Hypotension absent, as indicated by  1 or more  of the following  (1) (2) (3) (4):       (X) SBP greater than or equal to 90 mm Hg and without recent decrease greater than 40 mm Hg from       baseline in adult or child 10 years or older       Tachypnea absent    (X) Tachypnea absent, as indicated by respiratory rate of  1 or more  of the following  (1) (2):       (X) Less than or equal to 18 breaths per minute in adult or child 15years of age or older       * Milestone   Additional Notes   DATE: 03/13/23, CD 11    Tele          PERTINENT UPDATES:   ECHO showing reduced EF of 15 to 20% patient had a SUSI and cardioversion, left heart catheterization with 6 showed severe multivessel disease Impella removal on 3/10/2023 cardiology, pulmonary critical care was consulted and following. IV Lasix- held       VITALS:   98.4 (36.9) 17 62 108/69  98 % on ra       ABNL/PERTINENT LABS/RADIOLOGY/DIAGNOSTIC STUDIES:   Glucose 101       PHYSICAL EXAM:   Respiratory:  Normal respiratory effort. Clear to auscultation, bilaterally without Rales/Wheezes/Rhonchi. Cardiovascular: Regular rate and rhythm with normal S1/S2 without murmurs, rubs or gallops. MD CONSULTS/ASSESSMENT AND PLAN:   IM        1. Atrial fibrillation with RVR. Cardiology following. Patient status post cardiac cath. Patient also with reduced ejection fraction 15-20%. Patient remains in normal sinus rhythm. 2.   Acute hypoxic respiratory failure. Patient was extubated on 3/11/2023.     3.  Tobacco abuse. Smoking cessation discussed with patient. 4.   Hyperlipidemia. Continue statin. 5.   Hypertension. Continue current home medications. Continue Entresto and metoprolol. Diet: ADULT DIET; Regular; Low Fat/Low Chol/High Fiber/JANET   Code Status: Full Code      Dispo -possibly home in a.m if okay with cardiology. Cardiology   ASSESSMENT & PLAN       Ischemic cardiomyopathy - EF 20%, well compensated on exam.  Partial revascularization with Impella CP support as below. Repeat cath had LVEDP considerably improved compared to first cath. Complex multivessel coronary artery disease - status post LOLA to LAD and POBA to diagonal with Impella assist which was removed after 24 hours. Atrial fibrillation - status post 2 cardioversions earlier this admission, flipped back into A-fib yesterday but rate controlled. EP following. Currently on amiodarone 200 twice daily       Stop aspirin 81 as long as he is on anticoagulation.   We will maintain dual therapy with Plavix and Eliquis   Continue guideline directed medical therapy for CAD as well as cardiomyopathy   Repeat echo in 6 to 8 weeks.   If EF remains low despite revascularization and GDMT, will initiate discussion regarding ICD with EP   we will touch base with the EP before discharge for any additional EP needs or changes          MEDICATIONS:   Scheduled Meds:   metoprolol succinate, 50 mg, Oral, BID   amiodarone, 200 mg, Oral, BID   sacubitril-valsartan, 1 tablet, Oral, BID   apixaban, 5 mg, Oral, BID   atorvastatin, 40 mg, Oral, Nightly   clopidogrel, 75 mg, Oral, Daily   [Held by provider] furosemide, 40 mg, IntraVENous, TID   spironolactone, 25 mg, Oral, Daily   nicotine, 1 patch, TransDERmal, Daily         ORDERS:   Telemetry monitoring - 72 hour duration

## 2023-03-14 NOTE — PROGRESS NOTES
Pt ok for discharge per MD. Discharge instructions and medications reviewed, no questions or concerns. IV's removed. Telemetry removed, [facility] notified. Pt transported to personal vehicle via w/c with all belongings.

## 2023-03-14 NOTE — DISCHARGE SUMMARY
Hospital Medicine Discharge Summary    Daily progress note if not discharged today. Patient ID: Peter Zamora      Patient's PCP: No primary care provider on file. Admit Date: 3/3/2023     Discharge Date:   3/14/2023    Admitting Provider: Alban Armstrong MD     Discharge Provider: Jacqui Payton MD     Discharge Diagnoses: Active Hospital Problems    Diagnosis     Atrial fibrillation with RVR (HCC) [I48.91]      Priority: Medium    Coronary artery disease involving native coronary artery of native heart with unstable angina pectoris ([address] [I25.110]      Priority: Medium    Acute respiratory failure with hypoxia (HCC) [J96.01]      Priority: Medium    Acute pulmonary edema (HCC) [J81.0]      Priority: Medium    Mitral regurgitation [I34.0]      Priority: Medium    Biatrial enlargement [I51.7]      Priority: Medium    Pulmonary hypertension ([address] [I27.20]      Priority: Medium    Overweight (BMI 25.0-29. 9) [E66.3]      Priority: Medium    Current smoker [F17.200]      Priority: Medium    Alcohol use [Z78.9]      Priority: Medium    Acute systolic heart failure (HCC) [I50.21]      Priority: Medium    Cardiomyopathy ([address] [I42.9]      Priority: Medium    Uncontrolled hypertension [I10]      Priority: Medium    Atrial fibrillation with rapid ventricular response (HCC) [I48.91]      Priority: Medium       The patient was seen and examined on day of discharge and this discharge summary is in conjunction with any daily progress note from day of discharge. Hospital Course:      This is a 58-year-old male with a past medical history of alcohol abuse, tobacco abuse newly diagnosed heart failure with reduced EF admitted with shortness of breath and palpitation intubated and sedated unable to give any history according to the EMR patient with a chief complaint of shortness of breath fatigue dyspnea on exertion for 3 weeks on admission found to be A-fib with RVR echo showing reduced EF of 15 to 20% patient had a SUSI and cardioversion currently in normal sinus rhythm, left heart catheterization with 6 showed severe multivessel disease Impella removal on 3/10/2023 cardiology, pulmonary critical care consulted and following. Patient treated for:  1. Atrial fibrillation with RVR. Cardiology following. Patient status post cardiac cath. Patient also with reduced ejection fraction 15-20%. Follow with cardio as outpatient. S/P cardioversion 3/10. In SR.    3/10 Cath report:  Severe multivessel CAD and ischemic cardiomyopathy. LAD and diagonal 2 branch treated with PCI as above with Impella CP support    Impella removed 3/11.    2.   Acute hypoxic respiratory failure. Patient was extubated on 3/11/2023. Patient's respiratory status appears to be stable. He is not requiring oxygen    3. Tobacco abuse. Smoking cessation discussed with patient. 4.   Hyperlipidemia. Continue statin. 5.   Hypertension. Continued current home medications. Continued Entresto and metoprolol. Physical Exam Performed:     /78   Pulse 55   Temp 98.2 °F (36.8 °C) (Oral)   Resp 18   Ht 6' 2\" (1.88 m)   Wt 196 lb 6.4 oz (89.1 kg)   SpO2 96%   BMI 25.22 kg/m²       General appearance:  No apparent distress, appears stated age and cooperative. HEENT:  Normal cephalic, atraumatic without obvious deformity. Pupils equal, round, and reactive to light. Extra ocular muscles intact. Conjunctivae/corneas clear. Neck: Supple, with full range of motion. No jugular venous distention. Trachea midline. Respiratory:  Normal respiratory effort. Clear to auscultation, bilaterally without Rales/Wheezes/Rhonchi. Cardiovascular:  Regular rate and rhythm with normal S1/S2 without murmurs, rubs or gallops. Abdomen: Soft, non-tender, non-distended with normal bowel sounds. Musculoskeletal:  No clubbing, cyanosis or edema bilaterally. Full range of motion without deformity.   Skin: Skin color, texture, turgor normal.  No rashes or lesions. Neurologic:  Neurovascularly intact without any focal sensory/motor deficits. Cranial nerves: II-XII intact, grossly non-focal.  Psychiatric:  Alert and oriented, thought content appropriate, normal insight  Capillary Refill: Brisk,< 3 seconds   Peripheral Pulses: +2 palpable, equal bilaterally       Labs: For convenience and continuity at follow-up the following most recent labs are provided:      CBC:    Lab Results   Component Value Date/Time    WBC 11.5 03/12/2023 04:27 AM    HGB 13.3 03/12/2023 04:27 AM    HCT 40.8 03/12/2023 04:27 AM     03/12/2023 04:27 AM       Renal:    Lab Results   Component Value Date/Time     03/13/2023 03:37 AM    K 4.3 03/13/2023 03:37 AM     03/13/2023 03:37 AM    CO2 24 03/13/2023 03:37 AM    BUN 12 03/13/2023 03:37 AM    CREATININE 0.9 03/13/2023 03:37 AM    CALCIUM 9.4 03/13/2023 03:37 AM    PHOS 4.4 03/10/2023 03:38 PM         Significant Diagnostic Studies    Radiology:   XR ABDOMEN FOR NG/OG/NE TUBE PLACEMENT   Final Result   Enteric tube tip and side-port in the proximal stomach below the   gastroesophageal junction. XR CHEST PORTABLE   Final Result   No significant change from prior exam.  Impella unchanged terminating in the   region of the left ventricle. XR CHEST PORTABLE   Final Result   Status post ET tube placement in good position with slight decrease in the   heart size and slowly resolving central pulmonary congestion         XR CHEST PORTABLE   Final Result   No acute cardiopulmonary findings         XR CHEST PORTABLE   Final Result   1. Cardiomegaly with bilateral lung infiltrates and bilateral pleural   effusions, likely related to pulmonary edema. Superimposed pneumonia cannot   be excluded.                 Consults:     IP CONSULT TO CARDIOLOGY  IP CONSULT TO PULMONOLOGY  IP CONSULT TO CRITICAL CARE    Disposition:  Home     Condition at Discharge: Stable    Discharge Instructions/Follow-up:       Schedule an appointment with PCP as soon as possible for a visit  Visit [email or web address] to get a new [facility]) primary care provider, or call -17., Post hospital follow up    [address] Office Visit with Dr. Mili Dos Santos MD  Thursday Mar 23, 2023 [facility] 17. C/ [address] [place] [address] 55  [phone]          Go to Dr. Mili Dos Santos MD  Thursday Mar 23, 2023  Specialty: Internal Medicine, Cardiology, Interventional Cardiology  March 23rd at 9:30am with Mili Dos Santos MD [address] [facility] 55  [phone]     Code Status:  Full Code     Activity: activity as tolerated    Diet: cardiac diet      Discharge Medications:     Current Discharge Medication List             Details   amiodarone (CORDARONE) 200 MG tablet Take 1 tablet by mouth 2 times daily  Qty: 60 tablet, Refills: 2      apixaban (ELIQUIS) 5 MG TABS tablet Take 1 tablet by mouth 2 times daily  Qty: 60 tablet, Refills: 0      atorvastatin (LIPITOR) 40 MG tablet Take 1 tablet by mouth nightly  Qty: 30 tablet, Refills: 3      metoprolol succinate (TOPROL XL) 50 MG extended release tablet Take 1 tablet by mouth 2 times daily  Qty: 30 tablet, Refills: 3      sacubitril-valsartan (ENTRESTO) 24-26 MG per tablet Take 1 tablet by mouth 2 times daily  Qty: 60 tablet, Refills: 0      spironolactone (ALDACTONE) 25 MG tablet Take 1 tablet by mouth daily  Qty: 30 tablet, Refills: 3      clopidogrel (PLAVIX) 75 MG tablet Take 1 tablet by mouth daily  Qty: 30 tablet, Refills: 3             Time Spent on discharge: 35 minutes in the examination, evaluation, counseling and review of medications and discharge plan. Signed:    Mag Pichardo MD   3/14/2023      Thank you No primary care provider on file. for the opportunity to be involved in this patient's care. If you have any questions or concerns, please feel free to contact me at [phone].

## 2023-03-17 ENCOUNTER — TELEPHONE (OUTPATIENT)
Dept: CARDIOLOGY CLINIC | Age: 63
End: 2023-03-17

## 2023-03-17 NOTE — TELEPHONE ENCOUNTER
Submitted PA for Land OJigseeSigifredo  [facility] Key: [id number]) STATUS: APPROVED. This request has been approved using information available on the patient's profile. JVOBTO:[id number];XUDFPU:[email or web address]; Review Type:Prior Auth; Coverage Start Date:02/15/2023; Coverage End Date:03/16/2024;

## 2023-03-20 NOTE — PROGRESS NOTES
[facility] 3/10/2023  PROCEDURES PERFORMED  Left heart cath via right femoral Impella sheath  Impella CP placement  Coronary angiography  PCI to mid to distal LAD with LOLA placement  PCI to diagonal 2 with balloon angioplasty (POBA)  IVUS  FFR/DFR evaluation of left main  Distal aortic and iliofemoral arterial angiogram  PTA to proximal right common iliac  Moderate sedation 15 min CPT 44576  US guidance for vascular access CPT 08477  FINDINGS  Left main - 40% diffuse disease which is not significant by DFR (0.99) or IVUS (MLA > 10 mm²)  LAD - 80% diffuse stenosis in the midsegment treated successfully with PCI and placement of long drug-eluting stent with good final result. Subtotally occluded diagonal 2 was treated with POBA with acceptable result  Left circumflex - large OM1 branch is subtotally occluded with CRISTÓBAL I flow  RCA - not engaged  LVEDP - 3 mmHg at beginning of procedure and 15 mmHg at the end of the procedure  FINAL DIAGNOSIS  Severe multivessel CAD and ischemic cardiomyopathy. LAD and diagonal 2 branch treated with PCI as above with Impella CP support  ASSESSMENT & PLAN  Transfer to ICU  IV heparin with goal  while Impella in place  Continue aspirin and Plavix, hold other meds  Patient to remain intubated through the course of Impella support which is to be left in place overnight  Tentative extubation and Impella removal tomorrow depending on hemodynamic status and clinical course    [facility] 3/7/2023  FINDINGS  Left main - mild diffuse disease  LAD - 80% diffuse stenosis in the midsegment.   Small diagonal branch is subtotally occluded  Left circumflex - large OM1 branch is subtotally occluded with CRISTÓBAL I flow  RCA - large dominant vessel, mild luminal irregularities  LVEDP: 25  FINAL DIAGNOSIS  Severe two-vessel CAD suggesting at least some component of ischemic cardiomyopathy  Severely elevated left-sided filling pressures suggesting volume overload  PLAN/RECOMMENDATIONS  - Diuresis for high

## 2023-03-23 ENCOUNTER — APPOINTMENT (OUTPATIENT)
Dept: GENERAL RADIOLOGY | Age: 63
End: 2023-03-23
Payer: COMMERCIAL

## 2023-03-23 ENCOUNTER — OFFICE VISIT (OUTPATIENT)
Dept: CARDIOLOGY CLINIC | Age: 63
End: 2023-03-23
Payer: COMMERCIAL

## 2023-03-23 ENCOUNTER — HOSPITAL ENCOUNTER (INPATIENT)
Age: 63
LOS: 1 days | Discharge: HOME | End: 2023-03-24
Attending: EMERGENCY MEDICINE | Admitting: INTERNAL MEDICINE
Payer: COMMERCIAL

## 2023-03-23 VITALS
HEIGHT: 74 IN | DIASTOLIC BLOOD PRESSURE: 54 MMHG | BODY MASS INDEX: 24.96 KG/M2 | SYSTOLIC BLOOD PRESSURE: 84 MMHG | WEIGHT: 194.5 LBS

## 2023-03-23 DIAGNOSIS — I95.1 ORTHOSTATIC HYPOTENSION: Primary | ICD-10-CM

## 2023-03-23 DIAGNOSIS — I25.110 CORONARY ARTERY DISEASE INVOLVING NATIVE CORONARY ARTERY OF NATIVE HEART WITH UNSTABLE ANGINA PECTORIS (HCC): ICD-10-CM

## 2023-03-23 DIAGNOSIS — R53.83 FATIGUE, UNSPECIFIED TYPE: Primary | ICD-10-CM

## 2023-03-23 DIAGNOSIS — R53.83 FATIGUE, UNSPECIFIED TYPE: ICD-10-CM

## 2023-03-23 LAB
ALBUMIN SERPL-MCNC: 4.1 G/DL (ref 3.4–5)
ALBUMIN/GLOB SERPL: 1.2 {RATIO} (ref 1.1–2.2)
ALP SERPL-CCNC: 93 U/L (ref 40–129)
ALT SERPL-CCNC: 14 U/L (ref 10–40)
ANION GAP SERPL CALCULATED.3IONS-SCNC: 14 MMOL/L (ref 3–16)
AST SERPL-CCNC: 22 U/L (ref 15–37)
BASOPHILS # BLD: 0.1 K/UL (ref 0–0.2)
BASOPHILS NFR BLD: 0.8 %
BILIRUB SERPL-MCNC: 0.5 MG/DL (ref 0–1)
BUN SERPL-MCNC: 25 MG/DL (ref 7–20)
CALCIUM SERPL-MCNC: 9.8 MG/DL (ref 8.3–10.6)
CHLORIDE SERPL-SCNC: 98 MMOL/L (ref 99–110)
CO2 SERPL-SCNC: 18 MMOL/L (ref 21–32)
CREAT SERPL-MCNC: 1.3 MG/DL (ref 0.8–1.3)
DEPRECATED RDW RBC AUTO: 13.8 % (ref 12.4–15.4)
EKG ATRIAL RATE: 115 BPM
EKG DIAGNOSIS: NORMAL
EKG Q-T INTERVAL: 348 MS
EKG QRS DURATION: 102 MS
EKG QTC CALCULATION (BAZETT): 437 MS
EKG R AXIS: -25 DEGREES
EKG T AXIS: 75 DEGREES
EKG VENTRICULAR RATE: 95 BPM
EOSINOPHIL # BLD: 0.3 K/UL (ref 0–0.6)
EOSINOPHIL NFR BLD: 2.1 %
GFR SERPLBLD CREATININE-BSD FMLA CKD-EPI: >60 ML/MIN/{1.73_M2}
GLUCOSE SERPL-MCNC: 92 MG/DL (ref 70–99)
HCT VFR BLD AUTO: 49.3 % (ref 40.5–52.5)
HGB BLD-MCNC: 16.5 G/DL (ref 13.5–17.5)
LV EF: 18 %
LVEF MODALITY: NORMAL
LYMPHOCYTES # BLD: 1.2 K/UL (ref 1–5.1)
LYMPHOCYTES NFR BLD: 9.6 %
MCH RBC QN AUTO: 31 PG (ref 26–34)
MCHC RBC AUTO-ENTMCNC: 33.4 G/DL (ref 31–36)
MCV RBC AUTO: 93 FL (ref 80–100)
MONOCYTES # BLD: 0.7 K/UL (ref 0–1.3)
MONOCYTES NFR BLD: 5.9 %
NEUTROPHILS # BLD: 10.1 K/UL (ref 1.7–7.7)
NEUTROPHILS NFR BLD: 81.6 %
PLATELET # BLD AUTO: 463 K/UL (ref 135–450)
PMV BLD AUTO: 7.7 FL (ref 5–10.5)
POTASSIUM SERPL-SCNC: 4.8 MMOL/L (ref 3.5–5.1)
PROT SERPL-MCNC: 7.5 G/DL (ref 6.4–8.2)
RBC # BLD AUTO: 5.3 M/UL (ref 4.2–5.9)
SODIUM SERPL-SCNC: 130 MMOL/L (ref 136–145)
TROPONIN T SERPL-MCNC: <0.01 NG/ML
WBC # BLD AUTO: 12.3 K/UL (ref 4–11)

## 2023-03-23 PROCEDURE — 80053 COMPREHEN METABOLIC PANEL: CPT

## 2023-03-23 PROCEDURE — 2580000003 HC RX 258: Performed by: INTERNAL MEDICINE

## 2023-03-23 PROCEDURE — 93308 TTE F-UP OR LMTD: CPT

## 2023-03-23 PROCEDURE — 3074F SYST BP LT 130 MM HG: CPT | Performed by: INTERNAL MEDICINE

## 2023-03-23 PROCEDURE — 2580000003 HC RX 258: Performed by: EMERGENCY MEDICINE

## 2023-03-23 PROCEDURE — 93000 ELECTROCARDIOGRAM COMPLETE: CPT | Performed by: INTERNAL MEDICINE

## 2023-03-23 PROCEDURE — 93005 ELECTROCARDIOGRAM TRACING: CPT | Performed by: EMERGENCY MEDICINE

## 2023-03-23 PROCEDURE — 85025 COMPLETE CBC W/AUTO DIFF WBC: CPT

## 2023-03-23 PROCEDURE — 99214 OFFICE O/P EST MOD 30 MIN: CPT | Performed by: INTERNAL MEDICINE

## 2023-03-23 PROCEDURE — 99285 EMERGENCY DEPT VISIT HI MDM: CPT

## 2023-03-23 PROCEDURE — 84484 ASSAY OF TROPONIN QUANT: CPT

## 2023-03-23 PROCEDURE — 6370000000 HC RX 637 (ALT 250 FOR IP): Performed by: INTERNAL MEDICINE

## 2023-03-23 PROCEDURE — 3078F DIAST BP <80 MM HG: CPT | Performed by: INTERNAL MEDICINE

## 2023-03-23 PROCEDURE — 96360 HYDRATION IV INFUSION INIT: CPT

## 2023-03-23 PROCEDURE — 2060000000 HC ICU INTERMEDIATE R&B

## 2023-03-23 PROCEDURE — 71045 X-RAY EXAM CHEST 1 VIEW: CPT

## 2023-03-23 PROCEDURE — 93010 ELECTROCARDIOGRAM REPORT: CPT | Performed by: INTERNAL MEDICINE

## 2023-03-23 RX ORDER — ATORVASTATIN CALCIUM 40 MG/1
40 TABLET, FILM COATED ORAL NIGHTLY
Status: DISCONTINUED | OUTPATIENT
Start: 2023-03-23 | End: 2023-03-24 | Stop reason: HOSPADM

## 2023-03-23 RX ORDER — ACETAMINOPHEN 325 MG/1
650 TABLET ORAL EVERY 6 HOURS PRN
Status: DISCONTINUED | OUTPATIENT
Start: 2023-03-23 | End: 2023-03-24 | Stop reason: HOSPADM

## 2023-03-23 RX ORDER — METOPROLOL SUCCINATE 50 MG/1
50 TABLET, EXTENDED RELEASE ORAL DAILY
Status: DISCONTINUED | OUTPATIENT
Start: 2023-03-24 | End: 2023-03-24 | Stop reason: HOSPADM

## 2023-03-23 RX ORDER — 0.9 % SODIUM CHLORIDE 0.9 %
250 INTRAVENOUS SOLUTION INTRAVENOUS ONCE
Status: COMPLETED | OUTPATIENT
Start: 2023-03-23 | End: 2023-03-23

## 2023-03-23 RX ORDER — SODIUM CHLORIDE 9 MG/ML
INJECTION, SOLUTION INTRAVENOUS CONTINUOUS
Status: ACTIVE | OUTPATIENT
Start: 2023-03-23 | End: 2023-03-23

## 2023-03-23 RX ORDER — METOPROLOL SUCCINATE 50 MG/1
50 TABLET, EXTENDED RELEASE ORAL 2 TIMES DAILY
Status: DISCONTINUED | OUTPATIENT
Start: 2023-03-23 | End: 2023-03-23

## 2023-03-23 RX ORDER — POLYETHYLENE GLYCOL 3350 17 G/17G
17 POWDER, FOR SOLUTION ORAL DAILY PRN
Status: DISCONTINUED | OUTPATIENT
Start: 2023-03-23 | End: 2023-03-24 | Stop reason: HOSPADM

## 2023-03-23 RX ORDER — SODIUM CHLORIDE 0.9 % (FLUSH) 0.9 %
5-40 SYRINGE (ML) INJECTION EVERY 12 HOURS SCHEDULED
Status: DISCONTINUED | OUTPATIENT
Start: 2023-03-23 | End: 2023-03-24 | Stop reason: HOSPADM

## 2023-03-23 RX ORDER — PROCHLORPERAZINE EDISYLATE 5 MG/ML
10 INJECTION INTRAMUSCULAR; INTRAVENOUS EVERY 6 HOURS PRN
Status: DISCONTINUED | OUTPATIENT
Start: 2023-03-23 | End: 2023-03-24 | Stop reason: HOSPADM

## 2023-03-23 RX ORDER — SODIUM CHLORIDE 0.9 % (FLUSH) 0.9 %
5-40 SYRINGE (ML) INJECTION PRN
Status: DISCONTINUED | OUTPATIENT
Start: 2023-03-23 | End: 2023-03-24 | Stop reason: HOSPADM

## 2023-03-23 RX ORDER — SODIUM CHLORIDE 9 MG/ML
INJECTION, SOLUTION INTRAVENOUS PRN
Status: DISCONTINUED | OUTPATIENT
Start: 2023-03-23 | End: 2023-03-24 | Stop reason: HOSPADM

## 2023-03-23 RX ORDER — ACETAMINOPHEN 650 MG/1
650 SUPPOSITORY RECTAL EVERY 6 HOURS PRN
Status: DISCONTINUED | OUTPATIENT
Start: 2023-03-23 | End: 2023-03-24 | Stop reason: HOSPADM

## 2023-03-23 RX ORDER — AMIODARONE HYDROCHLORIDE 200 MG/1
200 TABLET ORAL 2 TIMES DAILY
Status: DISCONTINUED | OUTPATIENT
Start: 2023-03-23 | End: 2023-03-24

## 2023-03-23 RX ORDER — CLOPIDOGREL BISULFATE 75 MG/1
75 TABLET ORAL DAILY
Status: DISCONTINUED | OUTPATIENT
Start: 2023-03-23 | End: 2023-03-24 | Stop reason: HOSPADM

## 2023-03-23 RX ADMIN — AMIODARONE HYDROCHLORIDE 200 MG: 200 TABLET ORAL at 21:30

## 2023-03-23 RX ADMIN — APIXABAN 5 MG: 5 TABLET, FILM COATED ORAL at 21:30

## 2023-03-23 RX ADMIN — SODIUM CHLORIDE 250 ML: 9 INJECTION, SOLUTION INTRAVENOUS at 12:43

## 2023-03-23 RX ADMIN — SODIUM CHLORIDE: 9 INJECTION, SOLUTION INTRAVENOUS at 16:55

## 2023-03-23 RX ADMIN — Medication 10 ML: at 21:31

## 2023-03-23 RX ADMIN — ATORVASTATIN CALCIUM 40 MG: 40 TABLET, FILM COATED ORAL at 21:30

## 2023-03-23 ASSESSMENT — PAIN - FUNCTIONAL ASSESSMENT: PAIN_FUNCTIONAL_ASSESSMENT: NONE - DENIES PAIN

## 2023-03-23 ASSESSMENT — PAIN SCALES - WONG BAKER: WONGBAKER_NUMERICALRESPONSE: 0

## 2023-03-23 NOTE — ED PROVIDER NOTES
[facility]      CHIEF COMPLAINT  Fatigue (Starting Monday, worse with exertion. Admitted 2 weeks ago for CHF, CAD w/ stent placement. [facility] Cardio sent to ED for admission.)      HISTORY OF PRESENT ILLNESS  Bishop Sutherland is a 58 y.o. male with a history of coronary artery disease, ischemic cardiomyopathy and atrial fibrillation who was recently admitted to the hospital for high risk PCI and cardioversion presents with fatigue and lightheadedness. He was started on a host of blood pressure medications while admitted. He states he tried to go back to work this past Monday but was still lightheaded and fatigued he could not go to work for more than 45 minutes. He is not having chest pain or shortness of breath. He states as long as he is sitting down he is feeling better. He went to follow-up with his cardiologist today and was found to be hypotensive and orthostatic. He was sent to the ER for IV fluids and admission. .   No other complaints, modifying factors or associated symptoms. History obtained from the patient. I have reviewed the following from the nursing documentation. Past Medical History:   Diagnosis Date    CAD (coronary artery disease)     CHF (congestive heart failure) [address]     Hypertension      Past Surgical History:   Procedure Laterality Date    CORONARY ANGIOPLASTY WITH STENT PLACEMENT  03/10/2023     No family history on file.   Social History     Socioeconomic History    Marital status: Single     Spouse name: Not on file    Number of children: Not on file    Years of education: Not on file    Highest education level: Not on file   Occupational History    Not on file   Tobacco Use    Smoking status: Former     Types: Cigarettes     Quit date: 3/3/2023     Years since quittin.0    Smokeless tobacco: Never   Substance and Sexual Activity    Alcohol use: Not Currently     Comment: 2023- stopped    Drug use: Not Currently    Sexual activity: Not on grossly intact. ENT: Mucous membranes are moist.   NECK: Supple, trachea midline. HEART: Normal rate, irregular rhythm. LUNGS: Respirations unlabored. CTAB. Good air exchange. No wheezes, rales, or rhonchi. Speaking comfortably in full sentences. ABDOMEN:  Soft. Non-distended. Non-tender. No guarding or rebound. EXTREMITIES: No peripheral edema. MAEE. No acute deformities. SKIN: Warm, dry and intact. No acute rashes. NEUROLOGICAL: Alert and oriented X 3. CN II-XII grossly intact. Strength 5/5, sensation intact. PSYCHIATRIC: Normal mood and affect. LABS  I have reviewed all labs for this visit.    Results for orders placed or performed during the hospital encounter of 03/23/23   CBC with Auto Differential   Result Value Ref Range    WBC 12.3 (H) 4.0 - 11.0 K/uL    RBC 5.30 4.20 - 5.90 M/uL    Hemoglobin 16.5 13.5 - 17.5 g/dL    Hematocrit 49.3 40.5 - 52.5 %    MCV 93.0 80.0 - 100.0 fL    MCH 31.0 26.0 - 34.0 pg    MCHC 33.4 31.0 - 36.0 g/dL    RDW 13.8 12.4 - 15.4 %    Platelets 298 (H) 930 - 450 K/uL    MPV 7.7 5.0 - 10.5 fL    Neutrophils % 81.6 %    Lymphocytes % 9.6 %    Monocytes % 5.9 %    Eosinophils % 2.1 %    Basophils % 0.8 %    Neutrophils Absolute 10.1 (H) 1.7 - 7.7 K/uL    Lymphocytes Absolute 1.2 1.0 - 5.1 K/uL    Monocytes Absolute 0.7 0.0 - 1.3 K/uL    Eosinophils Absolute 0.3 0.0 - 0.6 K/uL    Basophils Absolute 0.1 0.0 - 0.2 K/uL   Comprehensive Metabolic Panel   Result Value Ref Range    Sodium 130 (L) 136 - 145 mmol/L    Potassium 4.8 3.5 - 5.1 mmol/L    Chloride 98 (L) 99 - 110 mmol/L    CO2 18 (L) 21 - 32 mmol/L    Anion Gap 14 3 - 16    Glucose 92 70 - 99 mg/dL    BUN 25 (H) 7 - 20 mg/dL    Creatinine 1.3 0.8 - 1.3 mg/dL    Est, Glom Filt Rate >60 >60    Calcium 9.8 8.3 - 10.6 mg/dL    Total Protein 7.5 6.4 - 8.2 g/dL    Albumin 4.1 3.4 - 5.0 g/dL    Albumin/Globulin Ratio 1.2 1.1 - 2.2    Total Bilirubin 0.5 0.0 - 1.0 mg/dL    Alkaline Phosphatase 93 40 - 129 U/L    ALT 14 10 -

## 2023-03-23 NOTE — PATIENT INSTRUCTIONS
PLAN:  Referral to cardiac rehab  Send to ER for fatigue, low blood pressure  Echocardiogram today without difficulty

## 2023-03-23 NOTE — ED NOTES
Pt calm and resting quietly with equal rise and fall of chest. Pt in NAD, RR even and unlabored. Side rails up, bed locked and in lowest position. Pt updated on plan of care. No needs at this time. Pt instructed on use of call light if needed.       Avinash Funez RN  03/23/23 1617

## 2023-03-23 NOTE — ED NOTES
Report given to A2 RN. Questions answered, care transferred. Pt in NAD, RR even and unlabored. VSS for transport.  Waiting for transport to take to unit     Belen Yo RN  03/23/23 [id number]

## 2023-03-23 NOTE — CARE COORDINATION
Case Management Assessment  Initial Evaluation    Date/Time of Evaluation: 3/23/2023 1:42 PM  Assessment Completed by: WILBERT Tena    If patient is discharged prior to next notation, then this note serves as note for discharge by case management. Patient Name: Giovana Dietrich                   YOB: 1960  Diagnosis: Atrial fibrillation with rapid ventricular response ([address] [I48.91]                   Date / Time: 3/23/2023 10:06 AM    Patient Admission Status: Inpatient   Readmission Risk (Low < 19, Mod (19-27), High > 27): Readmission Risk Score: 6.7    Current PCP: No primary care provider on file. PCP verified by CM? (P) Yes    Chart Reviewed: Yes      History Provided by: (P) Patient  Patient Orientation: (P) Alert and Oriented    Patient Cognition: (P) Alert    Hospitalization in the last 30 days (Readmission):  Yes    If yes, Readmission Assessment in CM Navigator will be completed.     Advance Directives:      Code Status: Prior   Patient's Primary Decision Maker is: (P) Named in [address] [place] [address]    Primary Decision Maker: delfino(cousin) - Other - [phone]    Secondary Decision Maker: hany ramon - Brother/Sister - [phone]    Discharge Planning:    Patient lives with: (P) Alone Type of Home: (P) House  Primary Care Giver: (P) Self  Patient Support Systems include: (P) Family Members, Parent   Current Financial resources: (P) None  Current community resources: (P) None  Current services prior to admission: (P) None            Current DME:              Type of Home Care services:       ADLS  Prior functional level: (P) Independent in ADLs/IADLs  Current functional level: (P) Independent in ADLs/IADLs    PT AM-PAC:   /24  OT AM-PAC:   /24    Family can provide assistance at DC: (P) Yes  Would you like Case Management to discuss the discharge plan with any other family members/significant others, and if so, who? (P) No  Plans to Return to Present Housing: (P) Yes  Other Identified

## 2023-03-23 NOTE — H&P
depletion. Cardiology consulted who will adjust medications. Atrial fibrillation with rapid ventricular response  Patient was rate controlled initially. I believe volume depletion caused reactive tachycardia and resulted in uncontrolled atrial fibrillation. Currently heart rate is acceptable, below 100. Continue on telemetry. Cardiology consulted for further input. Coronary artery disease  No chest pain. Had complex PCI relatively recently. Continue antiplatelet therapy. Hyponatremia  Most likely secondary to volume depletion. Continue to monitor daily in addition to proceeding with cautious IV fluid supplementation. Dyslipidemia  Continue statin as before. Given nonspecific weakness I will also check patient's CK level. Low likelihood, but there may be myopathy that interferes with treatment. Discussed with the patient. Questions answered      DVT Prophylaxis: Eliquis  Diet: Regular low-salt  Code Status: Full code    PT/OT Eval Status: Consider when able to tolerate. However, when blood pressure stabilizes patient is more likely to be fully independent. Dispo -inpatient stay. Probably 2 or 3 more days. Jaylene Godoy MD    Thank you for the opportunity to be involved in this patient's care. If you have any questions or concerns please feel free to contact me at [phone].

## 2023-03-24 VITALS
RESPIRATION RATE: 16 BRPM | SYSTOLIC BLOOD PRESSURE: 109 MMHG | DIASTOLIC BLOOD PRESSURE: 81 MMHG | OXYGEN SATURATION: 99 % | BODY MASS INDEX: 24.43 KG/M2 | WEIGHT: 190.4 LBS | HEIGHT: 74 IN | TEMPERATURE: 98.1 F | HEART RATE: 62 BPM

## 2023-03-24 PROBLEM — I95.1 ORTHOSTATIC HYPOTENSION: Status: ACTIVE | Noted: 2023-03-24

## 2023-03-24 PROBLEM — I48.19 PERSISTENT ATRIAL FIBRILLATION (HCC): Status: ACTIVE | Noted: 2023-03-24

## 2023-03-24 PROBLEM — I50.22 CHRONIC SYSTOLIC HEART FAILURE (HCC): Status: ACTIVE | Noted: 2023-03-24

## 2023-03-24 LAB
ALBUMIN SERPL-MCNC: 3.8 G/DL (ref 3.4–5)
ALBUMIN/GLOB SERPL: 1.3 {RATIO} (ref 1.1–2.2)
ALP SERPL-CCNC: 81 U/L (ref 40–129)
ALT SERPL-CCNC: 13 U/L (ref 10–40)
ANION GAP SERPL CALCULATED.3IONS-SCNC: 9 MMOL/L (ref 3–16)
AST SERPL-CCNC: 17 U/L (ref 15–37)
BASOPHILS # BLD: 0.1 K/UL (ref 0–0.2)
BASOPHILS NFR BLD: 1.5 %
BILIRUB SERPL-MCNC: 0.4 MG/DL (ref 0–1)
BUN SERPL-MCNC: 21 MG/DL (ref 7–20)
CALCIUM SERPL-MCNC: 9.2 MG/DL (ref 8.3–10.6)
CHLORIDE SERPL-SCNC: 103 MMOL/L (ref 99–110)
CK SERPL-CCNC: 30 U/L (ref 39–308)
CO2 SERPL-SCNC: 24 MMOL/L (ref 21–32)
CREAT SERPL-MCNC: 1.1 MG/DL (ref 0.8–1.3)
DEPRECATED RDW RBC AUTO: 13.7 % (ref 12.4–15.4)
EOSINOPHIL # BLD: 0.3 K/UL (ref 0–0.6)
EOSINOPHIL NFR BLD: 3.9 %
GFR SERPLBLD CREATININE-BSD FMLA CKD-EPI: >60 ML/MIN/{1.73_M2}
GLUCOSE SERPL-MCNC: 89 MG/DL (ref 70–99)
HCT VFR BLD AUTO: 44.7 % (ref 40.5–52.5)
HGB BLD-MCNC: 14.8 G/DL (ref 13.5–17.5)
LYMPHOCYTES # BLD: 1.2 K/UL (ref 1–5.1)
LYMPHOCYTES NFR BLD: 16 %
MCH RBC QN AUTO: 30.8 PG (ref 26–34)
MCHC RBC AUTO-ENTMCNC: 33.2 G/DL (ref 31–36)
MCV RBC AUTO: 92.9 FL (ref 80–100)
MONOCYTES # BLD: 0.4 K/UL (ref 0–1.3)
MONOCYTES NFR BLD: 5.9 %
NEUTROPHILS # BLD: 5.5 K/UL (ref 1.7–7.7)
NEUTROPHILS NFR BLD: 72.7 %
PLATELET # BLD AUTO: 405 K/UL (ref 135–450)
PMV BLD AUTO: 8.1 FL (ref 5–10.5)
POTASSIUM SERPL-SCNC: 4.6 MMOL/L (ref 3.5–5.1)
PROT SERPL-MCNC: 6.8 G/DL (ref 6.4–8.2)
RBC # BLD AUTO: 4.81 M/UL (ref 4.2–5.9)
SODIUM SERPL-SCNC: 136 MMOL/L (ref 136–145)
WBC # BLD AUTO: 7.5 K/UL (ref 4–11)

## 2023-03-24 PROCEDURE — 80053 COMPREHEN METABOLIC PANEL: CPT

## 2023-03-24 PROCEDURE — 99222 1ST HOSP IP/OBS MODERATE 55: CPT | Performed by: INTERNAL MEDICINE

## 2023-03-24 PROCEDURE — 36415 COLL VENOUS BLD VENIPUNCTURE: CPT

## 2023-03-24 PROCEDURE — 82550 ASSAY OF CK (CPK): CPT

## 2023-03-24 PROCEDURE — 2580000003 HC RX 258: Performed by: INTERNAL MEDICINE

## 2023-03-24 PROCEDURE — 99232 SBSQ HOSP IP/OBS MODERATE 35: CPT | Performed by: INTERNAL MEDICINE

## 2023-03-24 PROCEDURE — 85025 COMPLETE CBC W/AUTO DIFF WBC: CPT

## 2023-03-24 PROCEDURE — 6370000000 HC RX 637 (ALT 250 FOR IP): Performed by: INTERNAL MEDICINE

## 2023-03-24 RX ORDER — VALSARTAN 80 MG/1
40 TABLET ORAL DAILY
Status: DISCONTINUED | OUTPATIENT
Start: 2023-03-24 | End: 2023-03-24 | Stop reason: HOSPADM

## 2023-03-24 RX ORDER — AMIODARONE HYDROCHLORIDE 200 MG/1
200 TABLET ORAL DAILY
Qty: 30 TABLET | Refills: 0 | Status: SHIPPED | OUTPATIENT
Start: 2023-03-25

## 2023-03-24 RX ORDER — VALSARTAN 40 MG/1
40 TABLET ORAL DAILY
Qty: 30 TABLET | Refills: 3 | Status: SHIPPED | OUTPATIENT
Start: 2023-03-25

## 2023-03-24 RX ORDER — AMIODARONE HYDROCHLORIDE 200 MG/1
200 TABLET ORAL DAILY
Status: DISCONTINUED | OUTPATIENT
Start: 2023-03-25 | End: 2023-03-24 | Stop reason: HOSPADM

## 2023-03-24 RX ADMIN — VALSARTAN 40 MG: 80 TABLET, FILM COATED ORAL at 09:52

## 2023-03-24 RX ADMIN — Medication 10 ML: at 08:34

## 2023-03-24 RX ADMIN — METOPROLOL SUCCINATE 50 MG: 50 TABLET, EXTENDED RELEASE ORAL at 08:33

## 2023-03-24 RX ADMIN — CLOPIDOGREL BISULFATE 75 MG: 75 TABLET ORAL at 08:34

## 2023-03-24 RX ADMIN — AMIODARONE HYDROCHLORIDE 200 MG: 200 TABLET ORAL at 08:34

## 2023-03-24 RX ADMIN — APIXABAN 5 MG: 5 TABLET, FILM COATED ORAL at 08:34

## 2023-03-24 ASSESSMENT — ENCOUNTER SYMPTOMS
WHEEZING: 0
RIGHT EYE: 0
SHORTNESS OF BREATH: 0
HEMATEMESIS: 0
HEMATOCHEZIA: 0
LEFT EYE: 0
STRIDOR: 0

## 2023-03-24 ASSESSMENT — PAIN SCALES - WONG BAKER
WONGBAKER_NUMERICALRESPONSE: 0

## 2023-03-24 NOTE — PROGRESS NOTES
Aldactone were held this morning. Overnight his blood pressure has shown some recovery with systolics in the 364N. He has not gotten out of bed or walked to see if his lightheadedness symptoms are improved. Repeat limited echo yesterday showed persistently severely reduced EF around 20% with normally collapsing IVC. Patient denies chest pain/heaviness/pressure, palpitations, dyspnea, orthopnea, edema. REVIEW OF SYSTEMS  Constitutional: there has been no unanticipated weight loss.  + Extreme fatigue    Eyes: No visual changes or diplopia. No scleral icterus. ENT: No Headaches, hearing loss or vertigo. No mouth sores or sore throat. Cardiovascular: Reviewed in HPI  Respiratory: No cough or wheezing, no sputum production. No hematemesis. Gastrointestinal: No abdominal pain, appetite loss, blood in stools. No change in bowel or bladder habits. Genitourinary: No dysuria, trouble voiding, or hematuria. Musculoskeletal:  No gait disturbance, weakness or joint complaints. Integumentary: No rash or pruritis. Neurological: No headache, diplopia, change in muscle strength, numbness or tingling. No change in gait, balance, coordination, mood, affect, memory, mentation, behavior.  + Lightheadedness  Psychiatric: No anxiety, no depression. Endocrine: No malaise, fatigue or temperature intolerance. No excessive thirst, fluid intake, or urination. No tremor. Hematologic/Lymphatic: No abnormal bruising or bleeding, blood clots or swollen lymph nodes. Allergic/Immunologic: No nasal congestion or hives. CURRENT CARDIAC MEDICATIONS    Family, medical and social history reviewed and updated as necessary.     VITALS  /81   Pulse 62   Temp 98.1 °F (36.7 °C)   Resp 16   Ht 6' 2\" (1.88 m)   Wt 190 lb 6.4 oz (86.4 kg)   SpO2 99%   BMI 24.45 kg/m²     Intake/Output Summary (Last 24 hours) at 3/24/2023 1246  Last data filed at 3/24/2023 0834  Gross per 24 hour   Intake 490 ml   Output 0 ml   Net 490 ml       TELEMETRY: No significant arrhythmias noted overnight    General appearance - alert, cooperative, no distress, appears stated age  Head - Normocephalic, without obvious abnormality, atraumatic  Eyes - PERRL, conjunctiva/corneas clear  Nose - Nares normal, no drainage or sinus tenderness  Throat - Lips, mucosa, and tongue normal  Neck - Supple, symmetrical, trachea midline, no adenopathy, thyroid: not enlarged, symmetric, no tenderness/mass/nodules, no carotid bruit or JVD  Lungs - Clear to auscultation bilaterally, respirations unlabored  Chest wall - No tenderness or deformity  Heart - Regular rate and rhythm, S1, S2 normal, no murmur, no rub or gallop  Abdomen - Soft, non-tender, bowel sounds active all four quadrants,  no masses, no organomegaly  Extremities - Extremities normal, atraumatic, no cyanosis or edema  Pulses - 2+ and symmetric upper and lower extremities  Skin - Skin color, texture, turgor normal, no rashes or lesions  Psych - Normal mood and affect  Neurologic - Normal gross motor and sensory exam.       LABS:  Lab Results   Component Value Date/Time    WBC 7.5 03/24/2023 08:06 AM    RBC 4.81 03/24/2023 08:06 AM    HGB 14.8 03/24/2023 08:06 AM    HCT 44.7 03/24/2023 08:06 AM    MCV 92.9 03/24/2023 08:06 AM    RDW 13.7 03/24/2023 08:06 AM     03/24/2023 08:06 AM     Lab Results   Component Value Date/Time     03/24/2023 08:06 AM    K 4.6 03/24/2023 08:06 AM     03/24/2023 08:06 AM    CO2 24 03/24/2023 08:06 AM    BUN 21 03/24/2023 08:06 AM    CREATININE 1.1 03/24/2023 08:06 AM    AGRATIO 1.3 03/24/2023 08:06 AM    LABGLOM >60 03/24/2023 08:06 AM    GLUCOSE 89 03/24/2023 08:06 AM    PROT 6.8 03/24/2023 08:06 AM    CALCIUM 9.2 03/24/2023 08:06 AM    BILITOT 0.4 03/24/2023 08:06 AM    ALKPHOS 81 03/24/2023 08:06 AM    AST 17 03/24/2023 08:06 AM    ALT 13 03/24/2023 08:06 AM     Lab Results   Component Value Date    CKTOTAL 30 (L) 03/24/2023    TROPONINI <0.01 03/23/2023

## 2023-03-24 NOTE — DISCHARGE SUMMARY
Hospital Medicine Discharge Summary    Patient ID: Julius Peterson      Patient's PCP: No primary care provider on file. Admit Date: 3/23/2023     Discharge Date:   03/24/23     Admitting Provider: Román Uriarte MD     Discharge Provider: Breanne Sears MD     Discharge Diagnoses: Active Hospital Problems    Diagnosis     Atrial fibrillation with rapid ventricular response ([address] [I48.91]      Priority: Medium    Persistent atrial fibrillation (HCC) [I48.19]     Orthostatic hypotension [I95.1]     Chronic systolic heart failure ([address] [I50.22]        The patient was seen and examined on day of discharge and this discharge summary is in conjunction with any daily progress note from day of discharge. Hospital Course:     Patient with recent diagnosis of ischemic cardiomyopathy and atrial fibrillation had an episode of presyncope with orthostatic changes and came to the emergency department. Atrial fibrillation with rapid ventricular response was diagnosed. Heart rate is better now with fluid repletion. Seen by cardiac electrophysiology. Amiodarone dose adjusted. Outpatient follow-up to determine ablation is being considered. Recommended to continue holding Entresto and spironolactone. These were not prescribed  Reduce dose of amiodarone prescribed at 200 mg daily  Patient to follow-up with cardiac electrophysiology as outpatient  Patient was evaluated by cardiologist on the day of discharge and cleared for discharge. Patient is ambulatory and asymptomatic. Physical Exam Performed:     /81   Pulse 62   Temp 98.1 °F (36.7 °C)   Resp 16   Ht 6' 2\" (1.88 m)   Wt 190 lb 6.4 oz (86.4 kg)   SpO2 99%   BMI 24.45 kg/m²       General appearance:  No apparent distress, appears stated age and cooperative. HEENT:  Normal cephalic, atraumatic without obvious deformity. Pupils equal, round, and reactive to light. Extra ocular muscles intact. Conjunctivae/corneas clear.   Neck: Supple, with full range of motion. No jugular venous distention. Trachea midline. Respiratory:  Normal respiratory effort. Clear to auscultation, bilaterally without Rales/Wheezes/Rhonchi. Cardiovascular: Irregularly irregular rhythm with normal S1/S2 without murmurs, rubs or gallops. Abdomen: Soft, non-tender, non-distended with normal bowel sounds. Musculoskeletal:  No clubbing, cyanosis or edema bilaterally. Full range of motion without deformity. Skin: Skin color, texture, turgor normal.  No rashes or lesions. Neurologic:  Neurovascularly intact without any focal sensory/motor deficits. Cranial nerves: II-XII intact, grossly non-focal.  Psychiatric:  Alert and oriented, thought content appropriate, normal insight  Capillary Refill: Brisk,< 3 seconds   Peripheral Pulses: +2 palpable, equal bilaterally       Labs:  For convenience and continuity at follow-up the following most recent labs are provided:      CBC:    Lab Results   Component Value Date/Time    WBC 7.5 03/24/2023 08:06 AM    HGB 14.8 03/24/2023 08:06 AM    HCT 44.7 03/24/2023 08:06 AM     03/24/2023 08:06 AM       Renal:    Lab Results   Component Value Date/Time     03/24/2023 08:06 AM    K 4.6 03/24/2023 08:06 AM     03/24/2023 08:06 AM    CO2 24 03/24/2023 08:06 AM    BUN 21 03/24/2023 08:06 AM    CREATININE 1.1 03/24/2023 08:06 AM    CALCIUM 9.2 03/24/2023 08:06 AM    PHOS 4.4 03/10/2023 03:38 PM         Significant Diagnostic Studies    Radiology:   XR CHEST PORTABLE   Final Result   No acute cardiopulmonary findings                Consults:     IP CONSULT TO SPIRITUAL SERVICES  IP CONSULT TO CARDIOLOGY    Disposition: Home    Condition at Discharge: Stable    Discharge Instructions/Follow-up: Cardiac electrophysiology    Code Status:  Full Code     Activity: activity as tolerated    Diet: regular diet low-salt      Discharge Medications:     Current Discharge Medication List             Details   valsartan (DIOVAN) 40 MG

## 2023-03-24 NOTE — PROGRESS NOTES
Physician Progress Note      Miguel Chaves  [facility] #:                  [id number]  :                       1960  ADMIT DATE:       3/23/2023 10:06 AM  DISCH DATE:  RESPONDING  PROVIDER #:        Cherelle Swann MD          QUERY TEXT:    Patient admitted with \"volume depletion /Atrial fibrillation with rapid   ventricular response\" - and is maintained on Apixaban. If possible, please   document in progress notes and discharge summary if you are evaluating and/or   treating any of the following: The medical record reflects the following:  Risk Factors: recently diagnosed atrial fibrillation, recently diagnosed   cardiomyopathy (mixed ischemic/non-ischemic etiology, severe LV systolic   dysfunction). He is status post recent high risk PCI/stenting of LAD. Clinical Indicators: EP-Patient has a [id number] score of?at least?2? [Heart   failure (1 point) and Hypertension (1 point)]Longterm anticoagulation   is:?recommended Current anticoagulation:?apixaban  Treatment: Continue Apixaban -EP consult    Thank-You, Dena Christie RN, BSN, CCDS  Options provided:  -- Secondary hypercoagulable state in a patient with atrial fibrillation  -- Other - I will add my own diagnosis  -- Disagree - Not applicable / Not valid  -- Disagree - Clinically unable to determine / Unknown  -- Refer to Clinical Documentation Reviewer    PROVIDER RESPONSE TEXT:    Provider disagreed with this query. Atrial fibrillation does not cause generalized hypercoagulable state. This is   not being evaluated or treated.     Query created by: Sunday Guzman on 3/24/2023 11:29 AM      Electronically signed by:  Cherelle Swann MD 3/24/2023 11:55 AM

## 2023-03-24 NOTE — CONSULTS
Cardiac Electrophysiology Consult Note      Physician requesting consult: Valery Chavez MD   Reason for Consult: atrial fibrillation and dizziness  Admission Date: 3/23/2023  Room/Bed:  [address] [place] [address]    Assessment:     1. Atrial fibrillation: patient with recent first documented episode of atrial fibrillation. Associated symptoms: Dyspnea on exertion, Fatigue, and Shortness of breath      History of cardioversion: cardioversion twice over past few weeks  History of AF ablation: No history of atrial fibrillation ablation in the past  History of heart surgery/procedure: coronary angiography with coronary stent placement     Current use of anti-arrhythmic drugs: amiodarone   Previous use of anti-arrhythmic drugs: amiodarone for past few weeks     Overall LV function: Severe left ventricular systolic dysfunction   Size of left atrium: Moderately dilated LA size  Significant cardiac valvular disease: No significant valve disease  Family history of atrial fibrillation: Yes, mother and others     Alcohol consumption: None recently  Caffeine consumption: Minimal caffeine intake recently (tea)  Smoking status: quit smoking after recent hospitalization  Obstructive sleep apnea: Suspected, not yet tested  Exercise status: Does not exercise regularly (sedentary lifestyle)     I have had discussions with the patient about issues related to atrial fibrillation (including etiology, disease progression patterns, stroke risk and rate control issues). Patient had his questions answered to his satisfaction. Patient has a [id number] score of at least 2 [Heart failure (1 point) and Hypertension (1 point)]  Longterm anticoagulation is: recommended  Current anticoagulation: apixaban  Bleeding issues reported: no  Renal function: Normal  Thyroid function:  Normal     Patient has had cardioversion twice over the past month and has reverted back to atrial fibrillation both times. He is now on oral amiodarone.  No sense of

## 2023-03-24 NOTE — ACP (ADVANCE CARE PLANNING)
Met with Pt for Advance Directives consult. Pt stated he was not interested in Living Will, but that he would want his cousin to make medical decisions on his behalf. Pt wanting to read over form on his own before completing. Left copy of AD forms with Pt and explained state hierarchy of next of kin, that any family member on hierarchy above her cousin would be his decision maker without form being completed. Also informed Pt of 's availability to help complete form when ready.     Bishop Toussaint

## 2023-03-24 NOTE — DISCHARGE INSTRUCTIONS
FOLLOW-UP APPOINTMENTS    [facility] OFFICE - Appointment on 4/5/23 at 1:45pm with Dr. David Cardenas, [facility], operated by [facility]. [facility],  [facility], [address] [facility] [address], [address] [facility], [facility]. Office #: [phone]. If you are unable to make this appointment, please call to reschedule. Directions to [place] [address] towards [place]. [address] [place] [address] exit. Right off exit. Cross over [employer]. Right on [address]. Left into hospital. Follow the signs to the emergency room ( turn left toward the Emergency room). Go right at the first stop sign. Just past the Emergency room at the second stop sign turn right and go up the ramp and park on the top level if possible. Go in the glass doors of the [facility] we on the top level of the garage [address]. As soon as you get in the door turn left and our office is the one with the glass doors.

## 2023-03-24 NOTE — PROGRESS NOTES
Hospitalist Progress Note      PCP: No primary care provider on file. Date of Admission: 3/23/2023    Chief Complaint: Dizziness, weakness    Hospital Course: Patient with recent diagnosis of ischemic cardiomyopathy and atrial fibrillation had an episode of presyncope with orthostatic changes and came to the emergency department. Atrial fibrillation with rapid ventricular response was diagnosed. Heart rate is better now with fluid repletion. Seen by cardiac electrophysiology. Amiodarone dose adjusted but nothing else recommended. Outpatient follow-up to determine ablation is being considered. Subjective: No chest pain, no shortness of breath, no nausea, no vomiting       Medications:  Reviewed    Infusion Medications    sodium chloride       Scheduled Medications    [START ON 3/25/2023] amiodarone  200 mg Oral Daily    valsartan  40 mg Oral Daily    apixaban  5 mg Oral BID    atorvastatin  40 mg Oral Nightly    clopidogrel  75 mg Oral Daily    sodium chloride flush  5-40 mL IntraVENous 2 times per day    metoprolol succinate  50 mg Oral Daily     PRN Meds: sodium chloride flush, sodium chloride, polyethylene glycol, acetaminophen **OR** acetaminophen, prochlorperazine      Intake/Output Summary (Last 24 hours) at 3/24/2023 1125  Last data filed at 3/24/2023 0834  Gross per 24 hour   Intake 490 ml   Output 0 ml   Net 490 ml       Physical Exam Performed:    /76   Pulse 63   Temp 98 °F (36.7 °C) (Oral)   Resp 16   Ht 6' 2\" (1.88 m)   Wt 190 lb 6.4 oz (86.4 kg)   SpO2 99%   BMI 24.45 kg/m²     General appearance: No apparent distress, appears stated age and cooperative. HEENT: Pupils equal, round, and reactive to light. Conjunctivae/corneas clear. Neck: Supple, with full range of motion. No jugular venous distention. Trachea midline. Respiratory:  Normal respiratory effort. Clear to auscultation, bilaterally without Rales/Wheezes/Rhonchi.   Cardiovascular: Irregularly irregular rhythm

## 2023-03-28 ENCOUNTER — TELEPHONE (OUTPATIENT)
Dept: CARDIOLOGY CLINIC | Age: 63
End: 2023-03-28

## 2023-03-28 ENCOUNTER — TELEPHONE (OUTPATIENT)
Dept: CARDIOLOGY | Age: 63
End: 2023-03-28

## 2023-03-28 NOTE — TELEPHONE ENCOUNTER
Pt came into [place] dropped off [place] paper work for [employer] to review and complete. Pt stated paperwork needs to be completed and faxed over by 04/11/2023. FX# [phone]    Contact pt once forms have been completed & faxed. Papers have been placed in [employer] front mail bin.

## 2023-03-28 NOTE — TELEPHONE ENCOUNTER
----- Message from Simran Ruiz MD sent at 3/27/2023  9:50 PM EDT -----  Can I please see him in 2-4 weeks

## 2023-03-28 NOTE — TELEPHONE ENCOUNTER
Routing to [address] [place] [address] for advice upon return. Pt requesting FXW fill out FMLA ppw for his employer [employer]. Last OV 3/23/23. Pt admitted to [facility] 3/23-3/24. Upcoming OV FXW 4/17/23. Okay to fill out FMLA ppw? Please advise approximate return to work date.

## 2023-03-30 ENCOUNTER — OFFICE VISIT (OUTPATIENT)
Dept: INTERNAL MEDICINE CLINIC | Age: 63
End: 2023-03-30

## 2023-03-30 VITALS
WEIGHT: 191 LBS | HEIGHT: 74 IN | DIASTOLIC BLOOD PRESSURE: 62 MMHG | BODY MASS INDEX: 24.51 KG/M2 | SYSTOLIC BLOOD PRESSURE: 90 MMHG | HEART RATE: 97 BPM

## 2023-03-30 DIAGNOSIS — I25.5 ISCHEMIC CARDIOMYOPATHY: ICD-10-CM

## 2023-03-30 DIAGNOSIS — I25.83 CORONARY ARTERY DISEASE DUE TO LIPID RICH PLAQUE: ICD-10-CM

## 2023-03-30 DIAGNOSIS — I48.19 PERSISTENT ATRIAL FIBRILLATION (HCC): Primary | ICD-10-CM

## 2023-03-30 DIAGNOSIS — I25.10 CORONARY ARTERY DISEASE DUE TO LIPID RICH PLAQUE: ICD-10-CM

## 2023-03-30 DIAGNOSIS — E78.5 DYSLIPIDEMIA: ICD-10-CM

## 2023-03-30 PROCEDURE — 3078F DIAST BP <80 MM HG: CPT | Performed by: INTERNAL MEDICINE

## 2023-03-30 PROCEDURE — 99204 OFFICE O/P NEW MOD 45 MIN: CPT | Performed by: INTERNAL MEDICINE

## 2023-03-30 PROCEDURE — 3074F SYST BP LT 130 MM HG: CPT | Performed by: INTERNAL MEDICINE

## 2023-03-30 ASSESSMENT — PATIENT HEALTH QUESTIONNAIRE - PHQ9
1. LITTLE INTEREST OR PLEASURE IN DOING THINGS: 0
SUM OF ALL RESPONSES TO PHQ QUESTIONS 1-9: 0
SUM OF ALL RESPONSES TO PHQ QUESTIONS 1-9: 0
SUM OF ALL RESPONSES TO PHQ9 QUESTIONS 1 & 2: 0
2. FEELING DOWN, DEPRESSED OR HOPELESS: 0
SUM OF ALL RESPONSES TO PHQ QUESTIONS 1-9: 0
SUM OF ALL RESPONSES TO PHQ QUESTIONS 1-9: 0

## 2023-03-30 NOTE — PROGRESS NOTES
without wheezes  HEART:  Irregular rhythm, no appreciable murmur  ABD:  Benign, active bowel sounds  EXT:  No edema, pulses palpable  NEURO:  No focal neurologic deficits noted. ASSESSMENT / PLAN:   Atrial fibrillation:  Continue taking apixaban (Eliquis) 5 mg TWICE per day for anticoagulation and metoprolol succinate (Toprol XL) 50 mg TWICE per day for rate control. Continue taking amiodarone 200 mg ONCE per day for rhythm control. Coronary artery disease with ischemic cardiomyopathy (EF 20%) with treatment limited by hypotension. Continue taking clopidogrel (Plavix) 75 mg ONCE per day and valsartan (Diovan) 40 mg ONCE per day. Dyslipidemia:  Continue taking atorvastatin (Lipitor) 40 mg every evening.        Follow-up in three weeks, with cardiology on April 5 and April 17  Cardiac rehab

## 2023-03-30 NOTE — PATIENT INSTRUCTIONS
Atrial fibrillation:  Continue taking apixaban (Eliquis) 5 mg TWICE per day for anticoagulation and metoprolol succinate (Toprol XL) 50 mg TWICE per day for rate control. Continue taking amiodarone 200 mg ONCE per day for rhythm control. Coronary artery disease with ischemic cardiomyopathy (EF 20%) with treatment limited by hypotension. Continue taking clopidogrel (Plavix) 75 mg ONCE per day and valsartan (Diovan) 40 mg ONCE per day. Dyslipidemia:  Continue taking atorvastatin (Lipitor) 40 mg every evening.        Follow-up in three weeks, with cardiology on April 5 and April 17  Cardiac rehab

## 2023-04-05 ENCOUNTER — OFFICE VISIT (OUTPATIENT)
Dept: CARDIOLOGY CLINIC | Age: 63
End: 2023-04-05
Payer: COMMERCIAL

## 2023-04-05 ENCOUNTER — TELEPHONE (OUTPATIENT)
Dept: CARDIOLOGY CLINIC | Age: 63
End: 2023-04-05

## 2023-04-05 VITALS
HEIGHT: 74 IN | HEART RATE: 55 BPM | SYSTOLIC BLOOD PRESSURE: 104 MMHG | WEIGHT: 201.6 LBS | DIASTOLIC BLOOD PRESSURE: 70 MMHG | OXYGEN SATURATION: 98 % | BODY MASS INDEX: 25.87 KG/M2

## 2023-04-05 DIAGNOSIS — I50.22 CHRONIC SYSTOLIC HEART FAILURE (HCC): ICD-10-CM

## 2023-04-05 DIAGNOSIS — I48.19 PERSISTENT ATRIAL FIBRILLATION (HCC): Primary | ICD-10-CM

## 2023-04-05 DIAGNOSIS — I42.9 CARDIOMYOPATHY, UNSPECIFIED TYPE (HCC): ICD-10-CM

## 2023-04-05 DIAGNOSIS — I95.2 HYPOTENSION DUE TO DRUGS: ICD-10-CM

## 2023-04-05 PROCEDURE — 99214 OFFICE O/P EST MOD 30 MIN: CPT | Performed by: INTERNAL MEDICINE

## 2023-04-05 PROCEDURE — 3078F DIAST BP <80 MM HG: CPT | Performed by: INTERNAL MEDICINE

## 2023-04-05 PROCEDURE — 93000 ELECTROCARDIOGRAM COMPLETE: CPT | Performed by: INTERNAL MEDICINE

## 2023-04-05 PROCEDURE — 3074F SYST BP LT 130 MM HG: CPT | Performed by: INTERNAL MEDICINE

## 2023-04-05 RX ORDER — CLOPIDOGREL BISULFATE 75 MG/1
75 TABLET ORAL DAILY
Qty: 30 TABLET | Refills: 3 | Status: SHIPPED | OUTPATIENT
Start: 2023-04-05

## 2023-04-05 RX ORDER — ATORVASTATIN CALCIUM 40 MG/1
40 TABLET, FILM COATED ORAL NIGHTLY
Qty: 30 TABLET | Refills: 3 | Status: SHIPPED | OUTPATIENT
Start: 2023-04-05

## 2023-04-05 RX ORDER — METOPROLOL SUCCINATE 50 MG/1
50 TABLET, EXTENDED RELEASE ORAL 2 TIMES DAILY
Qty: 30 TABLET | Refills: 3 | Status: SHIPPED | OUTPATIENT
Start: 2023-04-05

## 2023-04-05 RX ORDER — AMIODARONE HYDROCHLORIDE 200 MG/1
200 TABLET ORAL DAILY
Qty: 30 TABLET | Refills: 3 | Status: SHIPPED | OUTPATIENT
Start: 2023-04-05

## 2023-04-05 ASSESSMENT — ENCOUNTER SYMPTOMS
WHEEZING: 0
RIGHT EYE: 0
LEFT EYE: 0
STRIDOR: 0
HEMATOCHEZIA: 0
HEMATEMESIS: 0
SHORTNESS OF BREATH: 0

## 2023-04-05 NOTE — PATIENT INSTRUCTIONS
Plan:     Discussed risks and benefits of Cardioversion vs. Ablation(literature provided)  -recommend undergoing repeat Cardioversion at this time, too soon for AF ablation  -patient is agreeable to this option and wishes to proceed  Continue current cardiac medications as prescribed  Follow up with me after procedure

## 2023-04-05 NOTE — PROGRESS NOTES
Behavior: Behavior normal.       ECG Interpretation:  (Date: 4/5/2023)  Rhythm: Atrial fibrillation  Rate: 87 BPM  PAC's / PVC's present: None  Conduction abnormalities: Normal AV conduction    ECG Interpretation:  (Date: 3/23/2023)  Rhythm: Atrial fibrillation  Rate: 95 BPM  PAC's / PVC's present: None  Conduction abnormalities: Normal AV conduction      Echocardiogram  (Date: 3/23/2023)   Summary   Left ventricular systolic function is severely reduced with a visually   estimated ejection fraction of 15-20 %. The left ventricle is normal in size with mild concentric hypertrophy. Global hypokinesis . Anteroseptal wall dyskinesis. LHC  (Date: 3/10/2023)   Severe multivessel CAD and ischemic cardiomyopathy. LAD and diagonal 2 branch treated with PCI as above with Impella CP support       Transesophageal Echocardiogram  (Date: 3/6/2023)   Ejection fraction is visually estimated at 20%. There is no evidence of mass or thrombus in the left atrium or appendage. Stress Test   N/A      Current Medications     Current Outpatient Medications   Medication Sig Dispense Refill    valsartan (DIOVAN) 40 MG tablet Take 1 tablet by mouth daily 30 tablet 3    amiodarone (CORDARONE) 200 MG tablet Take 1 tablet by mouth daily 30 tablet 0    apixaban (ELIQUIS) 5 MG TABS tablet Take 1 tablet by mouth 2 times daily 60 tablet 0    atorvastatin (LIPITOR) 40 MG tablet Take 1 tablet by mouth nightly 30 tablet 3    metoprolol succinate (TOPROL XL) 50 MG extended release tablet Take 1 tablet by mouth 2 times daily 30 tablet 3    clopidogrel (PLAVIX) 75 MG tablet Take 1 tablet by mouth daily 30 tablet 3     No current facility-administered medications for this visit.      Lab Review     Lab Results   Component Value Date/Time     03/24/2023 08:06 AM    K 4.6 03/24/2023 08:06 AM     03/24/2023 08:06 AM    CO2 24 03/24/2023 08:06 AM    BUN 21 03/24/2023 08:06 AM    CREATININE 1.1 03/24/2023 08:06 AM    GLUCOSE 89

## 2023-04-14 ENCOUNTER — HOSPITAL ENCOUNTER (OUTPATIENT)
Dept: CARDIAC REHAB | Age: 63
Setting detail: THERAPIES SERIES
Discharge: HOME | End: 2023-04-14

## 2023-04-17 ENCOUNTER — HOSPITAL ENCOUNTER (OUTPATIENT)
Dept: CARDIAC CATH/INVASIVE PROCEDURES | Age: 63
Discharge: HOME | End: 2023-04-17
Attending: INTERNAL MEDICINE | Admitting: INTERNAL MEDICINE
Payer: COMMERCIAL

## 2023-04-17 ENCOUNTER — OFFICE VISIT (OUTPATIENT)
Dept: CARDIOLOGY CLINIC | Age: 63
End: 2023-04-17
Payer: COMMERCIAL

## 2023-04-17 VITALS
DIASTOLIC BLOOD PRESSURE: 66 MMHG | WEIGHT: 204.5 LBS | SYSTOLIC BLOOD PRESSURE: 102 MMHG | HEART RATE: 49 BPM | OXYGEN SATURATION: 99 % | BODY MASS INDEX: 26.26 KG/M2

## 2023-04-17 VITALS — WEIGHT: 203.9 LBS | HEIGHT: 74 IN | BODY MASS INDEX: 26.17 KG/M2

## 2023-04-17 DIAGNOSIS — I25.5 ISCHEMIC CARDIOMYOPATHY: ICD-10-CM

## 2023-04-17 DIAGNOSIS — I25.110 CORONARY ARTERY DISEASE INVOLVING NATIVE CORONARY ARTERY OF NATIVE HEART WITH UNSTABLE ANGINA PECTORIS (HCC): ICD-10-CM

## 2023-04-17 DIAGNOSIS — I42.9 CARDIOMYOPATHY, UNSPECIFIED TYPE (HCC): Primary | ICD-10-CM

## 2023-04-17 DIAGNOSIS — I95.1 ORTHOSTATIC HYPOTENSION: ICD-10-CM

## 2023-04-17 PROCEDURE — 3074F SYST BP LT 130 MM HG: CPT | Performed by: INTERNAL MEDICINE

## 2023-04-17 PROCEDURE — 93005 ELECTROCARDIOGRAM TRACING: CPT | Performed by: INTERNAL MEDICINE

## 2023-04-17 PROCEDURE — 99214 OFFICE O/P EST MOD 30 MIN: CPT | Performed by: INTERNAL MEDICINE

## 2023-04-17 PROCEDURE — 3078F DIAST BP <80 MM HG: CPT | Performed by: INTERNAL MEDICINE

## 2023-04-17 RX ORDER — SODIUM CHLORIDE 0.9 % (FLUSH) 0.9 %
5-40 SYRINGE (ML) INJECTION EVERY 12 HOURS SCHEDULED
Status: DISCONTINUED | OUTPATIENT
Start: 2023-04-17 | End: 2023-04-17 | Stop reason: HOSPADM

## 2023-04-17 RX ORDER — FAMOTIDINE 10 MG/ML
20 INJECTION, SOLUTION INTRAVENOUS ONCE
Status: DISCONTINUED | OUTPATIENT
Start: 2023-04-17 | End: 2023-04-17 | Stop reason: HOSPADM

## 2023-04-17 RX ORDER — SODIUM CHLORIDE 0.9 % (FLUSH) 0.9 %
5-40 SYRINGE (ML) INJECTION PRN
Status: DISCONTINUED | OUTPATIENT
Start: 2023-04-17 | End: 2023-04-17 | Stop reason: HOSPADM

## 2023-04-17 RX ORDER — SODIUM CHLORIDE 9 MG/ML
INJECTION, SOLUTION INTRAVENOUS PRN
Status: DISCONTINUED | OUTPATIENT
Start: 2023-04-17 | End: 2023-04-17 | Stop reason: HOSPADM

## 2023-04-17 RX ORDER — SODIUM CHLORIDE, SODIUM LACTATE, POTASSIUM CHLORIDE, CALCIUM CHLORIDE 600; 310; 30; 20 MG/100ML; MG/100ML; MG/100ML; MG/100ML
INJECTION, SOLUTION INTRAVENOUS CONTINUOUS
Status: DISCONTINUED | OUTPATIENT
Start: 2023-04-17 | End: 2023-04-17 | Stop reason: HOSPADM

## 2023-04-17 RX ORDER — LORAZEPAM 0.5 MG/1
0.5 TABLET ORAL
Status: DISCONTINUED | OUTPATIENT
Start: 2023-04-17 | End: 2023-04-17 | Stop reason: HOSPADM

## 2023-04-17 RX ORDER — ONDANSETRON 2 MG/ML
4 INJECTION INTRAMUSCULAR; INTRAVENOUS EVERY 6 HOURS PRN
Status: DISCONTINUED | OUTPATIENT
Start: 2023-04-17 | End: 2023-04-17 | Stop reason: HOSPADM

## 2023-04-17 NOTE — PATIENT INSTRUCTIONS
PLAN:  Continue current cardiac medications without changes  No further cardiac testing at this time  Recommend COVID booster  Recommend a cardiac healthy diet (low salt, avoid red meat, avoid fatty or fried foods, lots of fruits and vegetables) as well as regular moderate intensity activity for 30 minutes per day 3-5 times per week.    Follow up with me in 3 months or sooner if needed

## 2023-04-18 LAB
EKG ATRIAL RATE: 46 BPM
EKG DIAGNOSIS: NORMAL
EKG P AXIS: 52 DEGREES
EKG P-R INTERVAL: 178 MS
EKG Q-T INTERVAL: 566 MS
EKG QRS DURATION: 102 MS
EKG QTC CALCULATION (BAZETT): 495 MS
EKG R AXIS: 8 DEGREES
EKG T AXIS: 84 DEGREES
EKG VENTRICULAR RATE: 46 BPM

## 2023-04-18 PROCEDURE — 93010 ELECTROCARDIOGRAM REPORT: CPT | Performed by: INTERNAL MEDICINE

## 2023-04-18 SDOH — ECONOMIC STABILITY: HOUSING INSECURITY
IN THE LAST 12 MONTHS, WAS THERE A TIME WHEN YOU DID NOT HAVE A STEADY PLACE TO SLEEP OR SLEPT IN A SHELTER (INCLUDING NOW)?: NO

## 2023-04-18 SDOH — ECONOMIC STABILITY: TRANSPORTATION INSECURITY
IN THE PAST 12 MONTHS, HAS LACK OF TRANSPORTATION KEPT YOU FROM MEETINGS, WORK, OR FROM GETTING THINGS NEEDED FOR DAILY LIVING?: NO

## 2023-04-18 SDOH — ECONOMIC STABILITY: INCOME INSECURITY: HOW HARD IS IT FOR YOU TO PAY FOR THE VERY BASICS LIKE FOOD, HOUSING, MEDICAL CARE, AND HEATING?: HARD

## 2023-04-18 SDOH — ECONOMIC STABILITY: FOOD INSECURITY: WITHIN THE PAST 12 MONTHS, YOU WORRIED THAT YOUR FOOD WOULD RUN OUT BEFORE YOU GOT MONEY TO BUY MORE.: NEVER TRUE

## 2023-04-18 SDOH — ECONOMIC STABILITY: FOOD INSECURITY: WITHIN THE PAST 12 MONTHS, THE FOOD YOU BOUGHT JUST DIDN'T LAST AND YOU DIDN'T HAVE MONEY TO GET MORE.: NEVER TRUE

## 2023-04-19 NOTE — PROCEDURES
Patient presented to [facility] and was found to be in sinus rhythm/sinus bradycardia. His planned cardioversion was therefore cancelled. He was discharged home. Amiodarone dose was reduced to 100 mg daily. To follow up in EP clinic.

## 2023-04-24 ENCOUNTER — TELEPHONE (OUTPATIENT)
Dept: CARDIOLOGY CLINIC | Age: 63
End: 2023-04-24

## 2023-04-24 ENCOUNTER — OFFICE VISIT (OUTPATIENT)
Dept: INTERNAL MEDICINE CLINIC | Age: 63
End: 2023-04-24

## 2023-04-24 VITALS
WEIGHT: 191 LBS | HEIGHT: 74 IN | SYSTOLIC BLOOD PRESSURE: 96 MMHG | DIASTOLIC BLOOD PRESSURE: 72 MMHG | BODY MASS INDEX: 24.51 KG/M2

## 2023-04-24 DIAGNOSIS — I25.83 CORONARY ARTERY DISEASE DUE TO LIPID RICH PLAQUE: ICD-10-CM

## 2023-04-24 DIAGNOSIS — E78.5 DYSLIPIDEMIA: ICD-10-CM

## 2023-04-24 DIAGNOSIS — I25.10 CORONARY ARTERY DISEASE DUE TO LIPID RICH PLAQUE: ICD-10-CM

## 2023-04-24 DIAGNOSIS — I48.19 PERSISTENT ATRIAL FIBRILLATION (HCC): ICD-10-CM

## 2023-04-24 DIAGNOSIS — F17.200 SMOKER: Primary | ICD-10-CM

## 2023-04-24 DIAGNOSIS — I25.5 ISCHEMIC CARDIOMYOPATHY: ICD-10-CM

## 2023-04-24 LAB
EXPIRATORY TIME: NORMAL
FEF 25-75% %PRED-PRE: NORMAL
FEF 25-75% PRED: NORMAL
FEF 25-75%-PRE: NORMAL
FEV1 %PRED-PRE: NORMAL
FEV1 PRED: NORMAL
FEV1/FVC %PRED-PRE: NORMAL
FEV1/FVC PRED: NORMAL
FEV1/FVC: NORMAL
FEV1: NORMAL
FVC %PRED-PRE: NORMAL
FVC PRED: NORMAL
FVC: NORMAL
PEF %PRED-PRE: NORMAL
PEF PRED: NORMAL
PEF-PRE: NORMAL

## 2023-04-24 PROCEDURE — 3078F DIAST BP <80 MM HG: CPT | Performed by: INTERNAL MEDICINE

## 2023-04-24 PROCEDURE — 3074F SYST BP LT 130 MM HG: CPT | Performed by: INTERNAL MEDICINE

## 2023-04-24 PROCEDURE — 99214 OFFICE O/P EST MOD 30 MIN: CPT | Performed by: INTERNAL MEDICINE

## 2023-04-24 NOTE — PROGRESS NOTES
SUBJECTIVE:  Follow up from 3/30 for atrial fibrillation and severe multivessel CAD and ischemic cardiomyopathy. Planned cardioversion (4/17, Dr Joan Rangel) not completed due to being in sinus rhythm. Unable to start cardiac rehab due to low blood pressure. He was at orientation on Friday, April 14 but has not yet heard back. Denies orthostatic dizziness or symptoms. Fatigue has mostly resolved and able to walk up 1-2 flights of stairs without stopping. Hospitalized (3/3 - 3/14) with atrial fibrillation with rapid ventricular response s/p SUSI and cardioversion (3/6). Echo with EF of 20%. Left heart catheterization showed severe multivessel CAD and ischemic cardiomyopathy. LAD and diagonal 2 branch treated with PCI and Impella (3/10 - 3/11). Taking clopidogrel 75 mg daily but not aspirin. He was hospitalized (3/23 - 3/24) with orthostatic hypotension and atrial fibrillation with RVR, adjusted dose of amiodarone to 200 mg once per day (down from BID). Entresto and spironolactone held. Stopped smoking Mar 3 after 40 pk/yr history of smoking.        MEDICATIONS:  amiodarone (CORDARONE) 100 MG tablet Take 1 tablet by mouth daily (4/24, half of 200 mg tablet)   apixaban (ELIQUIS) 5 MG TABS tablet Take 1 tablet by mouth 2 times daily   atorvastatin (LIPITOR) 40 MG tablet Take 1 tablet by mouth nightly   clopidogrel (PLAVIX) 75 MG tablet Take 1 tablet by mouth daily   metoprolol succinate (TOPROL XL) 50 MG tablet Take 1 tablet by mouth 2 times daily   valsartan (DIOVAN) 40 MG tablet Take 1 tablet by mouth daily     HgbA1c(3/3) 5.4%      LABS:03/24/2023  WBC 7.5   HGB 14.8      MCV 92.9      Sodium 136    Potassium  4.6    Chloride 103    CO2 24    Anion Gap 9    Glucose 89    BUN 21 (H)    Creatinine 1.1    Est, Glom Filt Rate >60    Calcium 9.2    Total Protein 6.8    Albumin 3.8    Albumin/Globulin Ratio 1.3    Total Bilirubin 0.4    Alkaline Phosphatase 81    ALT 13    AST 17       Portable CXR (3/23)

## 2023-04-24 NOTE — TELEPHONE ENCOUNTER
----- Message from Karen Segal MD sent at 4/21/2023  5:01 PM EDT -----  Let patient know their echo test shows reduced ht function, rec: referral to HF docs, dr Andi Ramos and rec: cardiac MRI to further evaluate. Thanks.

## 2023-04-24 NOTE — PATIENT INSTRUCTIONS
Paroxysmal atrial fibrillation in sinus rhythm: Continue taking apixaban (Eliquis) 5 mg TWICE per day for anticoagulation and metoprolol succinate (Toprol XL) 50 mg TWICE per day for rate control. Continue taking amiodarone 100 mg (half tablet) ONCE per day for rhythm control. Coronary artery disease with ischemic cardiomyopathy (EF 20%) with treatment limited by hypotension. Continue taking clopidogrel (Plavix) 75 mg ONCE per day and valsartan (Diovan) 40 mg (half tablet) ONCE per day. Dyslipidemia:  Continue taking atorvastatin (Lipitor) 40 mg every evening. Blood pressure borderline low at 96/72. History of smoking and shortness of breath: We will check spirometry today.        Follow-up with cardiology in three months, repeat echocardiogram to see if EF > 35%    [facility] in six weeks

## 2023-04-26 ENCOUNTER — HOSPITAL ENCOUNTER (OUTPATIENT)
Dept: CARDIAC REHAB | Age: 63
Setting detail: THERAPIES SERIES
Discharge: HOME | End: 2023-04-26
Payer: COMMERCIAL

## 2023-04-26 PROCEDURE — 93798 PHYS/QHP OP CAR RHAB W/ECG: CPT

## 2023-04-28 ENCOUNTER — HOSPITAL ENCOUNTER (OUTPATIENT)
Dept: CARDIAC REHAB | Age: 63
Setting detail: THERAPIES SERIES
Discharge: HOME | End: 2023-04-28
Payer: COMMERCIAL

## 2023-04-28 PROCEDURE — 93798 PHYS/QHP OP CAR RHAB W/ECG: CPT

## 2023-05-01 ENCOUNTER — HOSPITAL ENCOUNTER (OUTPATIENT)
Dept: CARDIAC REHAB | Age: 63
Setting detail: THERAPIES SERIES
Discharge: HOME | End: 2023-05-01
Payer: COMMERCIAL

## 2023-05-01 PROCEDURE — 93798 PHYS/QHP OP CAR RHAB W/ECG: CPT

## 2023-05-03 ENCOUNTER — HOSPITAL ENCOUNTER (OUTPATIENT)
Dept: CARDIAC REHAB | Age: 63
Setting detail: THERAPIES SERIES
Discharge: HOME | End: 2023-05-03
Payer: COMMERCIAL

## 2023-05-03 PROCEDURE — 93798 PHYS/QHP OP CAR RHAB W/ECG: CPT

## 2023-05-05 ENCOUNTER — HOSPITAL ENCOUNTER (OUTPATIENT)
Dept: CARDIAC REHAB | Age: 63
Setting detail: THERAPIES SERIES
Discharge: HOME | End: 2023-05-05
Payer: COMMERCIAL

## 2023-05-05 PROCEDURE — 93798 PHYS/QHP OP CAR RHAB W/ECG: CPT

## 2023-05-08 ENCOUNTER — HOSPITAL ENCOUNTER (OUTPATIENT)
Dept: CARDIAC REHAB | Age: 63
Setting detail: THERAPIES SERIES
Discharge: HOME | End: 2023-05-08
Payer: COMMERCIAL

## 2023-05-08 PROCEDURE — 93798 PHYS/QHP OP CAR RHAB W/ECG: CPT

## 2023-05-10 ENCOUNTER — TELEPHONE (OUTPATIENT)
Dept: CARDIOLOGY CLINIC | Age: 63
End: 2023-05-10

## 2023-05-10 ENCOUNTER — HOSPITAL ENCOUNTER (OUTPATIENT)
Dept: CARDIAC REHAB | Age: 63
Setting detail: THERAPIES SERIES
Discharge: HOME | End: 2023-05-10
Payer: COMMERCIAL

## 2023-05-10 PROCEDURE — 93798 PHYS/QHP OP CAR RHAB W/ECG: CPT

## 2023-05-12 ENCOUNTER — HOSPITAL ENCOUNTER (OUTPATIENT)
Dept: CARDIAC REHAB | Age: 63
Setting detail: THERAPIES SERIES
Discharge: HOME | End: 2023-05-12
Payer: COMMERCIAL

## 2023-05-12 PROCEDURE — 93798 PHYS/QHP OP CAR RHAB W/ECG: CPT

## 2023-05-15 ENCOUNTER — HOSPITAL ENCOUNTER (OUTPATIENT)
Dept: CARDIAC REHAB | Age: 63
Setting detail: THERAPIES SERIES
Discharge: HOME | End: 2023-05-15
Payer: COMMERCIAL

## 2023-05-15 PROCEDURE — 93798 PHYS/QHP OP CAR RHAB W/ECG: CPT

## 2023-05-17 ENCOUNTER — HOSPITAL ENCOUNTER (OUTPATIENT)
Dept: CARDIAC REHAB | Age: 63
Setting detail: THERAPIES SERIES
Discharge: HOME | End: 2023-05-17
Payer: COMMERCIAL

## 2023-05-17 PROCEDURE — 93798 PHYS/QHP OP CAR RHAB W/ECG: CPT

## 2023-05-17 NOTE — TELEPHONE ENCOUNTER
Spoke bala pt. Informed him letter was ready to be picked up today. He will  today. Letter placed in front bin.

## 2023-05-19 ENCOUNTER — APPOINTMENT (OUTPATIENT)
Dept: CARDIAC REHAB | Age: 63
End: 2023-05-19
Payer: COMMERCIAL

## 2023-05-22 ENCOUNTER — HOSPITAL ENCOUNTER (OUTPATIENT)
Dept: CARDIAC REHAB | Age: 63
Setting detail: THERAPIES SERIES
Discharge: HOME | End: 2023-05-22
Payer: COMMERCIAL

## 2023-05-22 PROCEDURE — 93798 PHYS/QHP OP CAR RHAB W/ECG: CPT

## 2023-05-24 ENCOUNTER — HOSPITAL ENCOUNTER (OUTPATIENT)
Dept: CARDIAC REHAB | Age: 63
Setting detail: THERAPIES SERIES
Discharge: HOME | End: 2023-05-24
Payer: COMMERCIAL

## 2023-05-24 PROCEDURE — 93798 PHYS/QHP OP CAR RHAB W/ECG: CPT

## 2023-05-26 ENCOUNTER — HOSPITAL ENCOUNTER (OUTPATIENT)
Dept: CARDIAC REHAB | Age: 63
Setting detail: THERAPIES SERIES
Discharge: HOME | End: 2023-05-26
Payer: COMMERCIAL

## 2023-05-26 PROCEDURE — 93798 PHYS/QHP OP CAR RHAB W/ECG: CPT

## 2023-05-31 ENCOUNTER — HOSPITAL ENCOUNTER (OUTPATIENT)
Dept: CARDIAC REHAB | Age: 63
Setting detail: THERAPIES SERIES
Discharge: HOME | End: 2023-05-31
Payer: COMMERCIAL

## 2023-05-31 PROCEDURE — 93798 PHYS/QHP OP CAR RHAB W/ECG: CPT

## 2023-06-02 ENCOUNTER — HOSPITAL ENCOUNTER (OUTPATIENT)
Dept: CARDIAC REHAB | Age: 63
Setting detail: THERAPIES SERIES
Discharge: HOME | End: 2023-06-02
Payer: COMMERCIAL

## 2023-06-02 PROCEDURE — 93798 PHYS/QHP OP CAR RHAB W/ECG: CPT

## 2023-06-05 ENCOUNTER — HOSPITAL ENCOUNTER (OUTPATIENT)
Dept: CARDIAC REHAB | Age: 63
Setting detail: THERAPIES SERIES
Discharge: HOME | End: 2023-06-05
Payer: COMMERCIAL

## 2023-06-05 PROCEDURE — 93798 PHYS/QHP OP CAR RHAB W/ECG: CPT

## 2023-06-07 ENCOUNTER — HOSPITAL ENCOUNTER (OUTPATIENT)
Dept: CARDIAC REHAB | Age: 63
Setting detail: THERAPIES SERIES
Discharge: HOME | End: 2023-06-07
Payer: COMMERCIAL

## 2023-06-07 PROCEDURE — 93798 PHYS/QHP OP CAR RHAB W/ECG: CPT

## 2023-06-09 ENCOUNTER — HOSPITAL ENCOUNTER (OUTPATIENT)
Dept: CARDIAC REHAB | Age: 63
Setting detail: THERAPIES SERIES
Discharge: HOME | End: 2023-06-09
Payer: COMMERCIAL

## 2023-06-09 PROCEDURE — 93798 PHYS/QHP OP CAR RHAB W/ECG: CPT

## 2023-06-14 ENCOUNTER — TELEPHONE (OUTPATIENT)
Dept: CARDIOLOGY CLINIC | Age: 63
End: 2023-06-14

## 2023-06-14 DIAGNOSIS — Z01.818 PREOPERATIVE CLEARANCE: Primary | ICD-10-CM

## 2023-06-14 NOTE — TELEPHONE ENCOUNTER
Patient was seen in office 06/14/2023. AF ablation discussed and agreed upon by [facility] and patient. Medications NOT discussed. Patient will need covid testing prior. Thank you! COVID testing ordered.      Medications to hold (Will need reviewed with patient after scheduling):  - Hold Eliquis the night prior to and the morning of the procedure

## 2023-06-19 ENCOUNTER — HOSPITAL ENCOUNTER (OUTPATIENT)
Dept: CARDIAC REHAB | Age: 63
Setting detail: THERAPIES SERIES
Discharge: HOME | End: 2023-06-19
Payer: COMMERCIAL

## 2023-06-19 PROCEDURE — 93798 PHYS/QHP OP CAR RHAB W/ECG: CPT

## 2023-06-21 ENCOUNTER — HOSPITAL ENCOUNTER (OUTPATIENT)
Dept: CARDIAC REHAB | Age: 63
Setting detail: THERAPIES SERIES
Discharge: HOME | End: 2023-06-21
Payer: COMMERCIAL

## 2023-06-21 PROCEDURE — 93798 PHYS/QHP OP CAR RHAB W/ECG: CPT

## 2023-06-22 DIAGNOSIS — I42.9 CARDIOMYOPATHY, UNSPECIFIED TYPE (HCC): Primary | ICD-10-CM

## 2023-06-22 RX ORDER — VALSARTAN 40 MG/1
40 TABLET ORAL DAILY
Qty: 30 TABLET | Refills: 3 | Status: SHIPPED | OUTPATIENT
Start: 2023-06-22

## 2023-06-22 NOTE — TELEPHONE ENCOUNTER
Refill  valsartan (DIOVAN) 40 MG tablet   [facility] - [facility] - [address] [place] [address] [phone] - F [phone]   Pt completely out

## 2023-06-23 ENCOUNTER — HOSPITAL ENCOUNTER (OUTPATIENT)
Dept: CARDIAC REHAB | Age: 63
Setting detail: THERAPIES SERIES
Discharge: HOME | End: 2023-06-23
Payer: COMMERCIAL

## 2023-06-23 PROCEDURE — 93798 PHYS/QHP OP CAR RHAB W/ECG: CPT

## 2023-06-26 ENCOUNTER — HOSPITAL ENCOUNTER (OUTPATIENT)
Dept: CARDIAC REHAB | Age: 63
Setting detail: THERAPIES SERIES
Discharge: HOME | End: 2023-06-26
Payer: COMMERCIAL

## 2023-06-26 PROCEDURE — 93798 PHYS/QHP OP CAR RHAB W/ECG: CPT

## 2023-06-28 ENCOUNTER — HOSPITAL ENCOUNTER (OUTPATIENT)
Dept: CARDIAC REHAB | Age: 63
Setting detail: THERAPIES SERIES
Discharge: HOME | End: 2023-06-28
Payer: COMMERCIAL

## 2023-06-28 PROCEDURE — 93798 PHYS/QHP OP CAR RHAB W/ECG: CPT

## 2023-06-29 ENCOUNTER — TELEPHONE (OUTPATIENT)
Dept: CARDIOLOGY CLINIC | Age: 63
End: 2023-06-29

## 2023-06-30 ENCOUNTER — HOSPITAL ENCOUNTER (OUTPATIENT)
Dept: CARDIAC REHAB | Age: 63
Setting detail: THERAPIES SERIES
Discharge: HOME | End: 2023-06-30
Payer: COMMERCIAL

## 2023-06-30 PROCEDURE — 93798 PHYS/QHP OP CAR RHAB W/ECG: CPT

## 2023-07-03 ENCOUNTER — APPOINTMENT (OUTPATIENT)
Dept: CARDIAC REHAB | Age: 63
End: 2023-07-03
Payer: COMMERCIAL

## 2023-07-03 RX ORDER — CLOPIDOGREL BISULFATE 75 MG/1
TABLET ORAL
Qty: 90 TABLET | Refills: 1 | Status: SHIPPED | OUTPATIENT
Start: 2023-07-03

## 2023-07-05 ENCOUNTER — HOSPITAL ENCOUNTER (OUTPATIENT)
Dept: CARDIAC REHAB | Age: 63
Setting detail: THERAPIES SERIES
Discharge: HOME | End: 2023-07-05
Payer: COMMERCIAL

## 2023-07-05 PROCEDURE — 93798 PHYS/QHP OP CAR RHAB W/ECG: CPT

## 2023-07-07 ENCOUNTER — HOSPITAL ENCOUNTER (OUTPATIENT)
Dept: CARDIAC REHAB | Age: 63
Setting detail: THERAPIES SERIES
Discharge: HOME | End: 2023-07-07
Payer: COMMERCIAL

## 2023-07-07 PROCEDURE — 93798 PHYS/QHP OP CAR RHAB W/ECG: CPT

## 2023-07-10 ENCOUNTER — HOSPITAL ENCOUNTER (OUTPATIENT)
Dept: CARDIAC REHAB | Age: 63
Setting detail: THERAPIES SERIES
Discharge: HOME | End: 2023-07-10
Payer: COMMERCIAL

## 2023-07-10 PROCEDURE — 93798 PHYS/QHP OP CAR RHAB W/ECG: CPT

## 2023-07-10 RX ORDER — ATORVASTATIN CALCIUM 40 MG/1
40 TABLET, FILM COATED ORAL NIGHTLY
Qty: 90 TABLET | Refills: 0 | Status: SHIPPED | OUTPATIENT
Start: 2023-07-10

## 2023-07-12 ENCOUNTER — HOSPITAL ENCOUNTER (OUTPATIENT)
Dept: CARDIAC REHAB | Age: 63
Setting detail: THERAPIES SERIES
Discharge: HOME | End: 2023-07-12
Payer: COMMERCIAL

## 2023-07-12 PROCEDURE — 93798 PHYS/QHP OP CAR RHAB W/ECG: CPT

## 2023-07-14 ENCOUNTER — HOSPITAL ENCOUNTER (OUTPATIENT)
Dept: CARDIAC REHAB | Age: 63
Setting detail: THERAPIES SERIES
Discharge: HOME | End: 2023-07-14
Payer: COMMERCIAL

## 2023-07-14 PROCEDURE — 93798 PHYS/QHP OP CAR RHAB W/ECG: CPT

## 2023-07-17 ENCOUNTER — OFFICE VISIT (OUTPATIENT)
Dept: CARDIOLOGY CLINIC | Age: 63
End: 2023-07-17

## 2023-07-17 ENCOUNTER — APPOINTMENT (OUTPATIENT)
Dept: CARDIAC REHAB | Age: 63
End: 2023-07-17
Payer: COMMERCIAL

## 2023-07-17 VITALS
DIASTOLIC BLOOD PRESSURE: 68 MMHG | BODY MASS INDEX: 26.44 KG/M2 | SYSTOLIC BLOOD PRESSURE: 122 MMHG | HEART RATE: 50 BPM | WEIGHT: 206 LBS | OXYGEN SATURATION: 96 % | HEIGHT: 74 IN

## 2023-07-17 DIAGNOSIS — I48.91 ATRIAL FIBRILLATION WITH RAPID VENTRICULAR RESPONSE (HCC): ICD-10-CM

## 2023-07-17 DIAGNOSIS — I48.19 PERSISTENT ATRIAL FIBRILLATION (HCC): ICD-10-CM

## 2023-07-17 DIAGNOSIS — I10 UNCONTROLLED HYPERTENSION: ICD-10-CM

## 2023-07-17 DIAGNOSIS — I42.9 CARDIOMYOPATHY, UNSPECIFIED TYPE (HCC): ICD-10-CM

## 2023-07-17 DIAGNOSIS — I25.110 CORONARY ARTERY DISEASE INVOLVING NATIVE CORONARY ARTERY OF NATIVE HEART WITH UNSTABLE ANGINA PECTORIS (HCC): ICD-10-CM

## 2023-07-17 DIAGNOSIS — I50.21 ACUTE SYSTOLIC HEART FAILURE (HCC): Primary | ICD-10-CM

## 2023-07-17 DIAGNOSIS — I25.5 ISCHEMIC CARDIOMYOPATHY: ICD-10-CM

## 2023-07-17 RX ORDER — METOPROLOL SUCCINATE 50 MG/1
50 TABLET, EXTENDED RELEASE ORAL 2 TIMES DAILY
Qty: 180 TABLET | Refills: 3 | Status: CANCELLED | OUTPATIENT
Start: 2023-07-17

## 2023-07-17 NOTE — PROGRESS NOTES
[facility] - Daily Progress/Follow-up Note        REASON FOR FOLLOW UP  Hypotension, CAD, Afib, recent MI, ischemic cardiomyopathy, CHF      INTERVAL HISTORY  Mr. Tabby Harris is a 58 y.o. male presenting for hospital follow up. He has a medical history of recent AFib with RVR, HTN, CAD s/p complex PCI, pulmonary HTN, HLD, ischemic cardiomyopathy. He was admitted 3/3/2023-3/14/0223 for SOB, fatigue. He was found to be in Afib with RVR. Echo 3/3/2023 EF 15-20%, moderate bi-atrial enlargement, mild TR, mild to moderate MR. 3/6/2023 SUSI Cardioversion for Afib. LHC 3 /7/2023 showed severe two vessel CAD- ischemic cardiomyopathy, severe elevated left sided filling pressures- volume overload. On 3/10/2023 Successful external electrical cardioversion. On 3/10/2023 LHC via right femoral impella supported performed PCI to mid to distal LAD with LOLA, PCI diagonal 2 with POBA, Distal aortic and iliofemoral arterial angiogram, PTA to proximal right common iliac artery, Impella removed and patient extubated next day. Overall successful procedure without any complications. He was discharged on guideline directed medical therapy for CAD, cardiomyopathy, A-fib. LOV, 3/23/2023,  he presented for first cardiology outpatient visit with increased weakness and fatigue as well as lightheadedness. He works at a [employer] and the first day went back to work he was feeling extremely tired and lightheaded so could not continue work. He was noted to be hypotensive with systolic blood pressure in the 80's. From the office, he was sent to emergency department due to concern for cardiac medication related hypotension as well as suspected volume depletion and dehydration. Admitted 3/23/2023-3/24/2023 from clinic for weakness and shortness of breath. Following admission his home metoprolol XL dosing was reduced to once a day from twice a day, and Entresto and Aldactone were held . He was restarted on valsartan.       LOV,

## 2023-07-17 NOTE — PATIENT INSTRUCTIONS
Your provider has ordered testing for further evaluation. An order/prescription has been included in your paper work. To schedule outpatient testing, contact Central Scheduling by calling [employer] ([phone]).

## 2023-07-19 ENCOUNTER — HOSPITAL ENCOUNTER (OUTPATIENT)
Dept: CARDIAC REHAB | Age: 63
Setting detail: THERAPIES SERIES
Discharge: HOME | End: 2023-07-19
Payer: COMMERCIAL

## 2023-07-19 PROCEDURE — 93798 PHYS/QHP OP CAR RHAB W/ECG: CPT

## 2023-07-21 ENCOUNTER — HOSPITAL ENCOUNTER (OUTPATIENT)
Dept: CARDIAC REHAB | Age: 63
Setting detail: THERAPIES SERIES
Discharge: HOME | End: 2023-07-21
Payer: COMMERCIAL

## 2023-07-21 PROCEDURE — 93798 PHYS/QHP OP CAR RHAB W/ECG: CPT

## 2023-07-24 ENCOUNTER — HOSPITAL ENCOUNTER (OUTPATIENT)
Dept: CARDIAC REHAB | Age: 63
Setting detail: THERAPIES SERIES
Discharge: HOME | End: 2023-07-24
Payer: COMMERCIAL

## 2023-07-24 PROCEDURE — 93798 PHYS/QHP OP CAR RHAB W/ECG: CPT

## 2023-07-26 ENCOUNTER — HOSPITAL ENCOUNTER (OUTPATIENT)
Dept: CARDIAC REHAB | Age: 63
Setting detail: THERAPIES SERIES
Discharge: HOME | End: 2023-07-26
Payer: COMMERCIAL

## 2023-07-26 PROCEDURE — 93798 PHYS/QHP OP CAR RHAB W/ECG: CPT

## 2023-07-28 ENCOUNTER — HOSPITAL ENCOUNTER (OUTPATIENT)
Dept: CARDIAC REHAB | Age: 63
Setting detail: THERAPIES SERIES
Discharge: HOME | End: 2023-07-28
Payer: COMMERCIAL

## 2023-07-31 ENCOUNTER — APPOINTMENT (OUTPATIENT)
Dept: CARDIAC REHAB | Age: 63
End: 2023-07-31
Payer: COMMERCIAL

## 2023-08-01 ENCOUNTER — HOSPITAL ENCOUNTER (OUTPATIENT)
Dept: CT IMAGING | Age: 63
Discharge: HOME | End: 2023-08-01
Attending: INTERNAL MEDICINE
Payer: COMMERCIAL

## 2023-08-01 DIAGNOSIS — I48.91 ATRIAL FIBRILLATION WITH RAPID VENTRICULAR RESPONSE (HCC): ICD-10-CM

## 2023-08-01 LAB
PERFORMED ON: NORMAL
POC CREATININE: 1.2 MG/DL (ref 0.8–1.3)
POC SAMPLE TYPE: NORMAL

## 2023-08-01 PROCEDURE — 82565 ASSAY OF CREATININE: CPT

## 2023-08-01 PROCEDURE — 6360000004 HC RX CONTRAST MEDICATION: Performed by: INTERNAL MEDICINE

## 2023-08-01 PROCEDURE — 75574 CT ANGIO HRT W/3D IMAGE: CPT

## 2023-08-01 RX ADMIN — IOPAMIDOL 100 ML: 755 INJECTION, SOLUTION INTRAVENOUS at 08:39

## 2023-08-09 ENCOUNTER — HOSPITAL ENCOUNTER (OUTPATIENT)
Dept: NUCLEAR MEDICINE | Age: 63
Discharge: HOME | End: 2023-08-09
Attending: INTERNAL MEDICINE
Payer: COMMERCIAL

## 2023-08-09 DIAGNOSIS — I50.21 ACUTE SYSTOLIC HEART FAILURE (HCC): ICD-10-CM

## 2023-08-09 DIAGNOSIS — I42.9 CARDIOMYOPATHY, UNSPECIFIED TYPE (HCC): ICD-10-CM

## 2023-08-09 LAB
LV EF: 18 %
LVEF MODALITY: NORMAL

## 2023-08-09 PROCEDURE — 78472 GATED HEART PLANAR SINGLE: CPT

## 2023-08-09 PROCEDURE — 3430000000 HC RX DIAGNOSTIC RADIOPHARMACEUTICAL: Performed by: INTERNAL MEDICINE

## 2023-08-09 PROCEDURE — A9560 TC99M LABELED RBC: HCPCS | Performed by: INTERNAL MEDICINE

## 2023-08-09 RX ADMIN — Medication 23.7 MILLICURIE: at 08:10

## 2023-08-11 RX ORDER — APIXABAN 5 MG/1
TABLET, FILM COATED ORAL
Qty: 180 TABLET | Refills: 1 | Status: SHIPPED | OUTPATIENT
Start: 2023-08-11

## 2023-08-11 NOTE — TELEPHONE ENCOUNTER
Procedure:  Afib Ablation  Doctor:  Dr. David Mercer  Date:  9/5/23  Time:  8am  Arrival:  6:30am  Reps:  JUDIT SNYDER  Anesthesia:  Yes      Spoke with patient. Please have patient arrive to the main entrance of [facility] ([address] [facility], [address] [place] [address]) and check in with the registration desk. They will be directed to the Cath Lab. Please call patient regarding medication instructions. Remind patient to be NPO after midnight (8 hours prior). Do not apply lotions/creams on skin the day of procedure. COVID testing - ordered. Pt had CTA Vein Mapping done on 8/1.

## 2023-08-21 ENCOUNTER — TELEPHONE (OUTPATIENT)
Dept: CARDIOLOGY CLINIC | Age: 63
End: 2023-08-21

## 2023-08-21 NOTE — TELEPHONE ENCOUNTER
----- Message from Jae Rojas MD sent at 8/21/2023 12:28 AM EDT -----  Please let Lisa Bejarano know that his most recent nuclear scan is showing that his cardiac function has returned to almost normal.  His ejection fraction has returned from less than 20% in March to 53% now. 55% and above is considered normal.  This is great news. At this time, I would like for him to continue the current regimen. Please schedule him to see me in December or January.

## 2023-08-26 RX ORDER — CLOPIDOGREL BISULFATE 75 MG/1
75 TABLET ORAL DAILY
Qty: 90 TABLET | Refills: 3 | Status: SHIPPED | OUTPATIENT
Start: 2023-08-26

## 2023-08-26 RX ORDER — VALSARTAN 40 MG/1
40 TABLET ORAL DAILY
Qty: 90 TABLET | Refills: 3 | Status: SHIPPED | OUTPATIENT
Start: 2023-08-26

## 2023-08-26 RX ORDER — ATORVASTATIN CALCIUM 40 MG/1
40 TABLET, FILM COATED ORAL NIGHTLY
Qty: 90 TABLET | Refills: 0 | Status: SHIPPED | OUTPATIENT
Start: 2023-08-26

## 2023-08-26 RX ORDER — METOPROLOL SUCCINATE 50 MG/1
50 TABLET, EXTENDED RELEASE ORAL 2 TIMES DAILY
Qty: 180 TABLET | Refills: 3 | Status: SHIPPED | OUTPATIENT
Start: 2023-08-26

## 2023-08-31 ENCOUNTER — HOSPITAL ENCOUNTER (OUTPATIENT)
Age: 63
Discharge: HOME | End: 2023-08-31
Payer: COMMERCIAL

## 2023-08-31 DIAGNOSIS — Z01.818 PREOPERATIVE CLEARANCE: ICD-10-CM

## 2023-08-31 LAB — SARS-COV-2 RDRP RESP QL NAA+PROBE: NOT DETECTED

## 2023-08-31 PROCEDURE — 87635 SARS-COV-2 COVID-19 AMP PRB: CPT

## 2023-09-01 ENCOUNTER — ANESTHESIA EVENT (OUTPATIENT)
Dept: CARDIAC CATH/INVASIVE PROCEDURES | Age: 63
DRG: 274 | End: 2023-09-01
Payer: COMMERCIAL

## 2023-09-05 ENCOUNTER — ANESTHESIA (OUTPATIENT)
Dept: CARDIAC CATH/INVASIVE PROCEDURES | Age: 63
DRG: 274 | End: 2023-09-05
Payer: COMMERCIAL

## 2023-09-05 ENCOUNTER — HOSPITAL ENCOUNTER (INPATIENT)
Dept: CARDIAC CATH/INVASIVE PROCEDURES | Age: 63
LOS: 1 days | Discharge: HOME | DRG: 274 | End: 2023-09-06
Attending: INTERNAL MEDICINE | Admitting: INTERNAL MEDICINE
Payer: COMMERCIAL

## 2023-09-05 PROBLEM — I48.20 CHRONIC ATRIAL FIBRILLATION (HCC): Status: ACTIVE | Noted: 2023-09-05

## 2023-09-05 PROBLEM — Z86.79 STATUS POST ABLATION OF ATRIAL FIBRILLATION: Status: ACTIVE | Noted: 2023-09-05

## 2023-09-05 PROBLEM — Z98.890 STATUS POST ABLATION OF ATRIAL FIBRILLATION: Status: ACTIVE | Noted: 2023-09-05

## 2023-09-05 LAB
ABO + RH BLD: NORMAL
ANION GAP SERPL CALCULATED.3IONS-SCNC: 8 MMOL/L (ref 3–16)
BLD GP AB SCN SERPL QL: NORMAL
BUN SERPL-MCNC: 21 MG/DL (ref 7–20)
CALCIUM SERPL-MCNC: 9.2 MG/DL (ref 8.3–10.6)
CHLORIDE SERPL-SCNC: 104 MMOL/L (ref 99–110)
CO2 SERPL-SCNC: 26 MMOL/L (ref 21–32)
CREAT SERPL-MCNC: 1.1 MG/DL (ref 0.8–1.3)
DEPRECATED RDW RBC AUTO: 13.6 % (ref 12.4–15.4)
EKG ATRIAL RATE: 44 BPM
EKG ATRIAL RATE: 49 BPM
EKG DIAGNOSIS: NORMAL
EKG DIAGNOSIS: NORMAL
EKG P AXIS: 56 DEGREES
EKG P AXIS: 68 DEGREES
EKG P-R INTERVAL: 170 MS
EKG P-R INTERVAL: 178 MS
EKG Q-T INTERVAL: 520 MS
EKG Q-T INTERVAL: 542 MS
EKG QRS DURATION: 102 MS
EKG QRS DURATION: 102 MS
EKG QTC CALCULATION (BAZETT): 463 MS
EKG QTC CALCULATION (BAZETT): 469 MS
EKG R AXIS: 2 DEGREES
EKG R AXIS: 4 DEGREES
EKG T AXIS: 69 DEGREES
EKG T AXIS: 89 DEGREES
EKG VENTRICULAR RATE: 44 BPM
EKG VENTRICULAR RATE: 49 BPM
GFR SERPLBLD CREATININE-BSD FMLA CKD-EPI: >60 ML/MIN/{1.73_M2}
GLUCOSE SERPL-MCNC: 99 MG/DL (ref 70–99)
HCT VFR BLD AUTO: 40 % (ref 40.5–52.5)
HGB BLD-MCNC: 13.8 G/DL (ref 13.5–17.5)
MCH RBC QN AUTO: 31.7 PG (ref 26–34)
MCHC RBC AUTO-ENTMCNC: 34.6 G/DL (ref 31–36)
MCV RBC AUTO: 91.6 FL (ref 80–100)
PLATELET # BLD AUTO: 223 K/UL (ref 135–450)
PMV BLD AUTO: 7.6 FL (ref 5–10.5)
POTASSIUM SERPL-SCNC: 4.3 MMOL/L (ref 3.5–5.1)
RBC # BLD AUTO: 4.36 M/UL (ref 4.2–5.9)
SODIUM SERPL-SCNC: 138 MMOL/L (ref 136–145)
WBC # BLD AUTO: 5.7 K/UL (ref 4–11)

## 2023-09-05 PROCEDURE — 80048 BASIC METABOLIC PNL TOTAL CA: CPT

## 2023-09-05 PROCEDURE — 93623 PRGRMD STIMJ&PACG IV RX NFS: CPT | Performed by: INTERNAL MEDICINE

## 2023-09-05 PROCEDURE — 2500000003 HC RX 250 WO HCPCS: Performed by: NURSE ANESTHETIST, CERTIFIED REGISTERED

## 2023-09-05 PROCEDURE — 86901 BLOOD TYPING SEROLOGIC RH(D): CPT

## 2023-09-05 PROCEDURE — 2720000010 HC SURG SUPPLY STERILE: Performed by: INTERNAL MEDICINE

## 2023-09-05 PROCEDURE — 3700000000 HC ANESTHESIA ATTENDED CARE

## 2023-09-05 PROCEDURE — 7100000000 HC PACU RECOVERY - FIRST 15 MIN

## 2023-09-05 PROCEDURE — C1894 INTRO/SHEATH, NON-LASER: HCPCS | Performed by: INTERNAL MEDICINE

## 2023-09-05 PROCEDURE — 2580000003 HC RX 258: Performed by: INTERNAL MEDICINE

## 2023-09-05 PROCEDURE — 93656 COMPRE EP EVAL ABLTJ ATR FIB: CPT | Performed by: INTERNAL MEDICINE

## 2023-09-05 PROCEDURE — 93005 ELECTROCARDIOGRAM TRACING: CPT | Performed by: INTERNAL MEDICINE

## 2023-09-05 PROCEDURE — 6360000002 HC RX W HCPCS

## 2023-09-05 PROCEDURE — 6370000000 HC RX 637 (ALT 250 FOR IP): Performed by: INTERNAL MEDICINE

## 2023-09-05 PROCEDURE — 86900 BLOOD TYPING SEROLOGIC ABO: CPT

## 2023-09-05 PROCEDURE — 2580000003 HC RX 258: Performed by: NURSE ANESTHETIST, CERTIFIED REGISTERED

## 2023-09-05 PROCEDURE — 6360000002 HC RX W HCPCS: Performed by: NURSE ANESTHETIST, CERTIFIED REGISTERED

## 2023-09-05 PROCEDURE — 6360000002 HC RX W HCPCS: Performed by: INTERNAL MEDICINE

## 2023-09-05 PROCEDURE — 2709999900 HC NON-CHARGEABLE SUPPLY: Performed by: INTERNAL MEDICINE

## 2023-09-05 PROCEDURE — C1730 CATH, EP, 19 OR FEW ELECT: HCPCS | Performed by: INTERNAL MEDICINE

## 2023-09-05 PROCEDURE — 93010 ELECTROCARDIOGRAM REPORT: CPT | Performed by: INTERNAL MEDICINE

## 2023-09-05 PROCEDURE — 02583ZZ DESTRUCTION OF CONDUCTION MECHANISM, PERCUTANEOUS APPROACH: ICD-10-PCS | Performed by: INTERNAL MEDICINE

## 2023-09-05 PROCEDURE — C1760 CLOSURE DEV, VASC: HCPCS | Performed by: INTERNAL MEDICINE

## 2023-09-05 PROCEDURE — 86850 RBC ANTIBODY SCREEN: CPT

## 2023-09-05 PROCEDURE — 85347 COAGULATION TIME ACTIVATED: CPT

## 2023-09-05 PROCEDURE — 7100000001 HC PACU RECOVERY - ADDTL 15 MIN

## 2023-09-05 PROCEDURE — 85027 COMPLETE CBC AUTOMATED: CPT

## 2023-09-05 PROCEDURE — 1200000000 HC SEMI PRIVATE

## 2023-09-05 PROCEDURE — 2580000003 HC RX 258

## 2023-09-05 PROCEDURE — C2630 CATH, EP, COOL-TIP: HCPCS | Performed by: INTERNAL MEDICINE

## 2023-09-05 PROCEDURE — 3700000001 HC ADD 15 MINUTES (ANESTHESIA)

## 2023-09-05 PROCEDURE — C1759 CATH, INTRA ECHOCARDIOGRAPHY: HCPCS | Performed by: INTERNAL MEDICINE

## 2023-09-05 PROCEDURE — 93656 COMPRE EP EVAL ABLTJ ATR FIB: CPT

## 2023-09-05 PROCEDURE — 2060000000 HC ICU INTERMEDIATE R&B

## 2023-09-05 PROCEDURE — 4A0234Z MEASUREMENT OF CARDIAC ELECTRICAL ACTIVITY, PERCUTANEOUS APPROACH: ICD-10-PCS | Performed by: INTERNAL MEDICINE

## 2023-09-05 PROCEDURE — 2500000003 HC RX 250 WO HCPCS

## 2023-09-05 PROCEDURE — C9399 UNCLASSIFIED DRUGS OR BIOLOG: HCPCS | Performed by: NURSE ANESTHETIST, CERTIFIED REGISTERED

## 2023-09-05 RX ORDER — HYDROMORPHONE HYDROCHLORIDE 1 MG/ML
0.5 INJECTION, SOLUTION INTRAMUSCULAR; INTRAVENOUS; SUBCUTANEOUS EVERY 5 MIN PRN
OUTPATIENT
Start: 2023-09-05

## 2023-09-05 RX ORDER — ONDANSETRON 2 MG/ML
4 INJECTION INTRAMUSCULAR; INTRAVENOUS
OUTPATIENT
Start: 2023-09-05 | End: 2023-09-06

## 2023-09-05 RX ORDER — PROTAMINE SULFATE 10 MG/ML
INJECTION, SOLUTION INTRAVENOUS PRN
Status: DISCONTINUED | OUTPATIENT
Start: 2023-09-05 | End: 2023-09-05 | Stop reason: SDUPTHER

## 2023-09-05 RX ORDER — MEPERIDINE HYDROCHLORIDE 50 MG/ML
12.5 INJECTION INTRAMUSCULAR; INTRAVENOUS; SUBCUTANEOUS EVERY 5 MIN PRN
OUTPATIENT
Start: 2023-09-05

## 2023-09-05 RX ORDER — OXYCODONE HYDROCHLORIDE 5 MG/1
5 TABLET ORAL
OUTPATIENT
Start: 2023-09-05

## 2023-09-05 RX ORDER — HYDRALAZINE HYDROCHLORIDE 25 MG/1
25 TABLET, FILM COATED ORAL EVERY 8 HOURS SCHEDULED
Status: DISCONTINUED | OUTPATIENT
Start: 2023-09-05 | End: 2023-09-06 | Stop reason: HOSPADM

## 2023-09-05 RX ORDER — VALSARTAN 80 MG/1
40 TABLET ORAL DAILY
Status: DISCONTINUED | OUTPATIENT
Start: 2023-09-05 | End: 2023-09-06 | Stop reason: HOSPADM

## 2023-09-05 RX ORDER — SODIUM CHLORIDE 9 MG/ML
INJECTION, SOLUTION INTRAVENOUS PRN
Status: DISCONTINUED | OUTPATIENT
Start: 2023-09-05 | End: 2023-09-06 | Stop reason: HOSPADM

## 2023-09-05 RX ORDER — KETAMINE HCL IN NACL, ISO-OSM 100MG/10ML
SYRINGE (ML) INJECTION PRN
Status: DISCONTINUED | OUTPATIENT
Start: 2023-09-05 | End: 2023-09-05 | Stop reason: SDUPTHER

## 2023-09-05 RX ORDER — OXYCODONE HYDROCHLORIDE 5 MG/1
10 TABLET ORAL PRN
OUTPATIENT
Start: 2023-09-05

## 2023-09-05 RX ORDER — ROCURONIUM BROMIDE 10 MG/ML
INJECTION, SOLUTION INTRAVENOUS PRN
Status: DISCONTINUED | OUTPATIENT
Start: 2023-09-05 | End: 2023-09-05 | Stop reason: SDUPTHER

## 2023-09-05 RX ORDER — HYDROMORPHONE HYDROCHLORIDE 1 MG/ML
0.25 INJECTION, SOLUTION INTRAMUSCULAR; INTRAVENOUS; SUBCUTANEOUS EVERY 5 MIN PRN
OUTPATIENT
Start: 2023-09-05

## 2023-09-05 RX ORDER — CLOPIDOGREL BISULFATE 75 MG/1
75 TABLET ORAL DAILY
Status: DISCONTINUED | OUTPATIENT
Start: 2023-09-05 | End: 2023-09-06 | Stop reason: HOSPADM

## 2023-09-05 RX ORDER — SODIUM CHLORIDE 0.9 % (FLUSH) 0.9 %
5-40 SYRINGE (ML) INJECTION PRN
Status: DISCONTINUED | OUTPATIENT
Start: 2023-09-05 | End: 2023-09-06 | Stop reason: HOSPADM

## 2023-09-05 RX ORDER — IPRATROPIUM BROMIDE AND ALBUTEROL SULFATE 2.5; .5 MG/3ML; MG/3ML
1 SOLUTION RESPIRATORY (INHALATION)
OUTPATIENT
Start: 2023-09-05 | End: 2023-09-06

## 2023-09-05 RX ORDER — SODIUM CHLORIDE 0.9 % (FLUSH) 0.9 %
5-40 SYRINGE (ML) INJECTION EVERY 12 HOURS SCHEDULED
Status: DISCONTINUED | OUTPATIENT
Start: 2023-09-05 | End: 2023-09-06 | Stop reason: HOSPADM

## 2023-09-05 RX ORDER — ONDANSETRON 2 MG/ML
4 INJECTION INTRAMUSCULAR; INTRAVENOUS EVERY 6 HOURS PRN
Status: DISCONTINUED | OUTPATIENT
Start: 2023-09-05 | End: 2023-09-06 | Stop reason: HOSPADM

## 2023-09-05 RX ORDER — LORAZEPAM 0.5 MG/1
0.5 TABLET ORAL
Status: ACTIVE | OUTPATIENT
Start: 2023-09-05 | End: 2023-09-06

## 2023-09-05 RX ORDER — HEPARIN SODIUM 1000 [USP'U]/ML
INJECTION, SOLUTION INTRAVENOUS; SUBCUTANEOUS
Status: COMPLETED | OUTPATIENT
Start: 2023-09-05 | End: 2023-09-05

## 2023-09-05 RX ORDER — EPHEDRINE SULFATE 50 MG/ML
INJECTION INTRAVENOUS PRN
Status: DISCONTINUED | OUTPATIENT
Start: 2023-09-05 | End: 2023-09-05 | Stop reason: SDUPTHER

## 2023-09-05 RX ORDER — PROPOFOL 10 MG/ML
INJECTION, EMULSION INTRAVENOUS PRN
Status: DISCONTINUED | OUTPATIENT
Start: 2023-09-05 | End: 2023-09-05 | Stop reason: SDUPTHER

## 2023-09-05 RX ORDER — AMIODARONE HYDROCHLORIDE 200 MG/1
100 TABLET ORAL DAILY
Status: DISCONTINUED | OUTPATIENT
Start: 2023-09-06 | End: 2023-09-06 | Stop reason: HOSPADM

## 2023-09-05 RX ORDER — FENTANYL CITRATE 50 UG/ML
INJECTION, SOLUTION INTRAMUSCULAR; INTRAVENOUS PRN
Status: DISCONTINUED | OUTPATIENT
Start: 2023-09-05 | End: 2023-09-05 | Stop reason: SDUPTHER

## 2023-09-05 RX ORDER — PROCHLORPERAZINE EDISYLATE 5 MG/ML
5 INJECTION INTRAMUSCULAR; INTRAVENOUS
OUTPATIENT
Start: 2023-09-05 | End: 2023-09-06

## 2023-09-05 RX ORDER — ATORVASTATIN CALCIUM 40 MG/1
40 TABLET, FILM COATED ORAL NIGHTLY
Status: DISCONTINUED | OUTPATIENT
Start: 2023-09-05 | End: 2023-09-06 | Stop reason: HOSPADM

## 2023-09-05 RX ORDER — LIDOCAINE HYDROCHLORIDE 20 MG/ML
INJECTION, SOLUTION INFILTRATION; PERINEURAL PRN
Status: DISCONTINUED | OUTPATIENT
Start: 2023-09-05 | End: 2023-09-05 | Stop reason: SDUPTHER

## 2023-09-05 RX ORDER — PANTOPRAZOLE SODIUM 40 MG/1
40 TABLET, DELAYED RELEASE ORAL
Status: DISCONTINUED | OUTPATIENT
Start: 2023-09-05 | End: 2023-09-06 | Stop reason: HOSPADM

## 2023-09-05 RX ORDER — DEXAMETHASONE SODIUM PHOSPHATE 10 MG/ML
INJECTION INTRAMUSCULAR; INTRAVENOUS PRN
Status: DISCONTINUED | OUTPATIENT
Start: 2023-09-05 | End: 2023-09-05 | Stop reason: SDUPTHER

## 2023-09-05 RX ORDER — SODIUM CHLORIDE 9 MG/ML
INJECTION, SOLUTION INTRAVENOUS PRN
OUTPATIENT
Start: 2023-09-05

## 2023-09-05 RX ORDER — METOPROLOL SUCCINATE 25 MG/1
50 TABLET, EXTENDED RELEASE ORAL DAILY
Status: DISCONTINUED | OUTPATIENT
Start: 2023-09-06 | End: 2023-09-06 | Stop reason: HOSPADM

## 2023-09-05 RX ORDER — ONDANSETRON 2 MG/ML
INJECTION INTRAMUSCULAR; INTRAVENOUS PRN
Status: DISCONTINUED | OUTPATIENT
Start: 2023-09-05 | End: 2023-09-05 | Stop reason: SDUPTHER

## 2023-09-05 RX ORDER — SODIUM CHLORIDE 0.9 % (FLUSH) 0.9 %
5-40 SYRINGE (ML) INJECTION PRN
OUTPATIENT
Start: 2023-09-05

## 2023-09-05 RX ORDER — SUCRALFATE 1 G/1
1 TABLET ORAL EVERY 8 HOURS SCHEDULED
Status: DISCONTINUED | OUTPATIENT
Start: 2023-09-05 | End: 2023-09-06 | Stop reason: HOSPADM

## 2023-09-05 RX ORDER — HEPARIN SODIUM 10000 [USP'U]/100ML
INJECTION, SOLUTION INTRAVENOUS CONTINUOUS PRN
Status: DISCONTINUED | OUTPATIENT
Start: 2023-09-05 | End: 2023-09-06 | Stop reason: HOSPADM

## 2023-09-05 RX ORDER — ACETAMINOPHEN 325 MG/1
650 TABLET ORAL EVERY 4 HOURS PRN
Status: DISCONTINUED | OUTPATIENT
Start: 2023-09-05 | End: 2023-09-06 | Stop reason: HOSPADM

## 2023-09-05 RX ORDER — SODIUM CHLORIDE 0.9 % (FLUSH) 0.9 %
5-40 SYRINGE (ML) INJECTION EVERY 12 HOURS SCHEDULED
OUTPATIENT
Start: 2023-09-05

## 2023-09-05 RX ADMIN — SUCRALFATE 1 G: 1 TABLET ORAL at 13:55

## 2023-09-05 RX ADMIN — Medication 50 MG: at 08:13

## 2023-09-05 RX ADMIN — HEPARIN SODIUM 1000 UNITS: 1000 INJECTION, SOLUTION INTRAVENOUS; SUBCUTANEOUS at 11:05

## 2023-09-05 RX ADMIN — PANTOPRAZOLE SODIUM 40 MG: 40 TABLET, DELAYED RELEASE ORAL at 16:45

## 2023-09-05 RX ADMIN — HEPARIN SODIUM 1000 UNITS: 1000 INJECTION, SOLUTION INTRAVENOUS; SUBCUTANEOUS at 09:37

## 2023-09-05 RX ADMIN — APIXABAN 5 MG: 5 TABLET, FILM COATED ORAL at 13:55

## 2023-09-05 RX ADMIN — APIXABAN 5 MG: 5 TABLET, FILM COATED ORAL at 20:47

## 2023-09-05 RX ADMIN — SODIUM CHLORIDE, PRESERVATIVE FREE 10 ML: 5 INJECTION INTRAVENOUS at 20:46

## 2023-09-05 RX ADMIN — PANTOPRAZOLE SODIUM 40 MG: 40 TABLET, DELAYED RELEASE ORAL at 13:55

## 2023-09-05 RX ADMIN — HEPARIN SODIUM 7000 UNITS: 1000 INJECTION, SOLUTION INTRAVENOUS; SUBCUTANEOUS at 08:50

## 2023-09-05 RX ADMIN — FENTANYL CITRATE 50 MCG: 50 INJECTION, SOLUTION INTRAMUSCULAR; INTRAVENOUS at 08:23

## 2023-09-05 RX ADMIN — CLOPIDOGREL BISULFATE 75 MG: 75 TABLET ORAL at 13:55

## 2023-09-05 RX ADMIN — VALSARTAN 40 MG: 80 TABLET, FILM COATED ORAL at 13:55

## 2023-09-05 RX ADMIN — ATORVASTATIN CALCIUM 40 MG: 40 TABLET, FILM COATED ORAL at 20:47

## 2023-09-05 RX ADMIN — DEXAMETHASONE SODIUM PHOSPHATE 10 MG: 10 INJECTION INTRAMUSCULAR; INTRAVENOUS at 08:23

## 2023-09-05 RX ADMIN — LIDOCAINE HYDROCHLORIDE 80 MG: 20 INJECTION, SOLUTION INFILTRATION; PERINEURAL at 08:13

## 2023-09-05 RX ADMIN — CEFAZOLIN 1000 MG: 1 INJECTION, POWDER, FOR SOLUTION INTRAMUSCULAR; INTRAVENOUS at 13:31

## 2023-09-05 RX ADMIN — SODIUM CHLORIDE: 9 INJECTION, SOLUTION INTRAVENOUS at 08:11

## 2023-09-05 RX ADMIN — SUGAMMADEX 200 MG: 100 INJECTION, SOLUTION INTRAVENOUS at 12:00

## 2023-09-05 RX ADMIN — PROTAMINE SULFATE 40 MG: 10 INJECTION, SOLUTION INTRAVENOUS at 11:51

## 2023-09-05 RX ADMIN — HEPARIN SODIUM 7000 UNITS: 1000 INJECTION, SOLUTION INTRAVENOUS; SUBCUTANEOUS at 09:17

## 2023-09-05 RX ADMIN — SUCRALFATE 1 G: 1 TABLET ORAL at 20:47

## 2023-09-05 RX ADMIN — HEPARIN SODIUM 3000 UNITS: 1000 INJECTION, SOLUTION INTRAVENOUS; SUBCUTANEOUS at 09:58

## 2023-09-05 RX ADMIN — EPHEDRINE SULFATE 40 MG: 50 INJECTION INTRAVENOUS at 10:10

## 2023-09-05 RX ADMIN — EPHEDRINE SULFATE 10 MG: 50 INJECTION INTRAVENOUS at 08:13

## 2023-09-05 RX ADMIN — FENTANYL CITRATE 50 MCG: 50 INJECTION, SOLUTION INTRAMUSCULAR; INTRAVENOUS at 11:03

## 2023-09-05 RX ADMIN — PROPOFOL 100 MG: 10 INJECTION, EMULSION INTRAVENOUS at 08:13

## 2023-09-05 RX ADMIN — ROCURONIUM BROMIDE 30 MG: 50 INJECTION, SOLUTION INTRAVENOUS at 08:41

## 2023-09-05 RX ADMIN — SODIUM CHLORIDE, PRESERVATIVE FREE 10 ML: 5 INJECTION INTRAVENOUS at 13:58

## 2023-09-05 RX ADMIN — ROCURONIUM BROMIDE 70 MG: 50 INJECTION, SOLUTION INTRAVENOUS at 08:13

## 2023-09-05 RX ADMIN — HYDRALAZINE HYDROCHLORIDE 25 MG: 25 TABLET, FILM COATED ORAL at 17:44

## 2023-09-05 RX ADMIN — HEPARIN SODIUM 1300 UNITS/HR: 10000 INJECTION, SOLUTION INTRAVENOUS at 09:17

## 2023-09-05 RX ADMIN — PHENYLEPHRINE HYDROCHLORIDE 20 MCG/MIN: 10 INJECTION INTRAVENOUS at 08:29

## 2023-09-05 RX ADMIN — ONDANSETRON 4 MG: 2 INJECTION INTRAMUSCULAR; INTRAVENOUS at 08:23

## 2023-09-05 ASSESSMENT — ENCOUNTER SYMPTOMS
SHORTNESS OF BREATH: 0
WHEEZING: 0

## 2023-09-05 NOTE — PROGRESS NOTES
Patient admitted to room 217 from cath lab. Patient oriented to room, call light, bed rails, phone, lights and bathroom. Patient instructed about the schedule of the day including: vital sign frequency, lab draws, possible tests, frequency of MD and staff rounds, including RN/MD rounding together at bedside, daily weights, and I &O's. Patient instructed about prescribed diet, how to call and order meals, and television. bed alarm in place, patient aware of placement and reason. Telemetry box  in place, patient aware of placement and reason. Bed locked, in lowest position, side rails up 2/4, call light within reach. Will continue to monitor.       Vitals:    09/05/23 1402   BP: (!) 146/80   Pulse: 51   Resp: 18   Temp:    SpO2: 98%

## 2023-09-05 NOTE — ANESTHESIA POSTPROCEDURE EVALUATION
Department of Anesthesiology  Postprocedure Note    Patient: Sophie Gracia  MRN: [id number]  YOB: 1960  Date of evaluation: 9/5/2023      Procedure Summary     Date: 09/05/23 Room / Location: [facility] Cardiac Cath Lab    Anesthesia Start: 0811 Anesthesia Stop: 1253    Procedure: ABLATION Diagnosis:       Other persistent atrial fibrillation      Status post ablation of atrial fibrillation      Chronic atrial fibrillation ([address])      Other persistent atrial fibrillation    Scheduled Providers: BRANT Martinez CRNA; Vijay Verma MD Responsible Provider: Vijay Verma MD    Anesthesia Type: general ASA Status: 3          Anesthesia Type: No value filed.     Triston Phase I: Triston Score: 10    Triston Phase II:        Anesthesia Post Evaluation    Patient location during evaluation: PACU  Patient participation: complete - patient participated  Level of consciousness: awake and alert  Airway patency: patent  Nausea & Vomiting: no nausea and no vomiting  Complications: no  Cardiovascular status: hemodynamically stable  Respiratory status: acceptable  Hydration status: euvolemic  Pain management: adequate

## 2023-09-05 NOTE — H&P
09/05/23  0706      K 4.3      CO2 26   BUN 21*   CREATININE 1.1       No results for input(s): INR, PROTIME in the last 72 hours. No results for input(s): CKMB, TROPONINI in the last 72 hours. No results for input(s): PROBNP in the last 72 hours.         Signed by: Saira Antony MD

## 2023-09-05 NOTE — PROCEDURES
PROCEDURES PERFORMED:    1. Electrophysiology study  2. 3D mapping of the left atrium  3. Transseptal puncture through an intact septum [96150]  4. Ablation of atrial fibrillation  5. Intracardiac echocardiography [92306]  6. Drug infusion [[id number]]    : Stephany Spencer MD    Assistant: None    Complications: none    Estimated blood loss: less than 50 ml    Anesthesia: general anesthesia    Medications used for induction/testing: isoproterenol    Other medications: None    Preoperative Diagnosis: atrial fibrillation     Postoperative Diagnosis: atrial fibrillation     History: This is a 58 y.o. male with history of persistent atrial fibrillation requiring cardioversion and amiodarone therapy with arrhythmia-mediated cardiomyopathy presenting for ablation of atrial fibrillation. DETAILS OF PROCEDURE:     The patient was brought to the electrophysiology laboratory in stable condition. He was in a fasting, non-sedated state. The risks, benefits and alternatives of the procedure were discussed with the patient in details. The risks including, but not limited to, vascular injury, bleeding, infection, radiation exposure, injury to cardiac and surrounding structures, injury to the normal conduction system of the heart, stroke, myocardial infarction and death were all discussed. The patient considered the various treatment options and decided to proceed with the EP study and attempted ablation procedure. Written informed consent was obtained and placed on the chart. A sign-in and a subsequent time-out protocol were completed to confirm the identity of the patient and the procedure to be performed. The patient was prepped and draped in a sterile fashion. Lidocaine/bupivicaine mixture was administered to the right and left groin. Using a modified Seldinger technique (under ultrasound guidance), venous access was obtained and sheaths were placed as detailed below.   Catheters were then placed under

## 2023-09-05 NOTE — PROGRESS NOTES
4 Eyes Skin Assessment     The patient is being assess for  Admission    I agree that 2 RN's have performed a thorough Head to Toe Skin Assessment on the patient. ALL assessment sites listed below have been assessed. Areas assessed by both nurses: Sylrin Bon  [x]   Head, Face, and Ears   [x]   Shoulders, Back, and Chest  [x]   Arms, Elbows, and Hands   [x]   Coccyx, Sacrum, and Ischum  [x]   Legs, Feet, and Heels        Does the Patient have Skin Breakdown?   No         Shiv Prevention initiated:  No   Wound Care Orders initiated:  No      [facility] nurse consulted for Pressure Injury (Stage 3,4, Unstageable, DTI, NWPT, and Complex wounds):  No      Nurse 1 eSignature: Electronically signed by Adryan Funez RN on 9/5/23 at 2:05 PM EDT    **SHARE this note so that the co-signing nurse is able to place an eSignature**    Nurse 2 eSignature: Electronically signed by Will Don RN on 9/5/23 at 2:25 PM EDT

## 2023-09-05 NOTE — PROGRESS NOTES
Notified Dr. Jam Whitman of BP trending up. Received new order for PO hydralazine TID. Noted slight oozing to left groin site; Dr. Jam Whitman aware. Will continue to monitor.

## 2023-09-06 VITALS
HEIGHT: 74 IN | OXYGEN SATURATION: 100 % | WEIGHT: 206 LBS | BODY MASS INDEX: 26.44 KG/M2 | DIASTOLIC BLOOD PRESSURE: 80 MMHG | HEART RATE: 50 BPM | TEMPERATURE: 98.2 F | SYSTOLIC BLOOD PRESSURE: 131 MMHG | RESPIRATION RATE: 16 BRPM

## 2023-09-06 LAB
ANION GAP SERPL CALCULATED.3IONS-SCNC: 10 MMOL/L (ref 3–16)
BUN SERPL-MCNC: 15 MG/DL (ref 7–20)
CALCIUM SERPL-MCNC: 8.1 MG/DL (ref 8.3–10.6)
CHLORIDE SERPL-SCNC: 106 MMOL/L (ref 99–110)
CO2 SERPL-SCNC: 20 MMOL/L (ref 21–32)
CREAT SERPL-MCNC: 0.9 MG/DL (ref 0.8–1.3)
DEPRECATED RDW RBC AUTO: 13.6 % (ref 12.4–15.4)
GFR SERPLBLD CREATININE-BSD FMLA CKD-EPI: >60 ML/MIN/{1.73_M2}
GLUCOSE SERPL-MCNC: 122 MG/DL (ref 70–99)
HCT VFR BLD AUTO: 37.9 % (ref 40.5–52.5)
HGB BLD-MCNC: 12.8 G/DL (ref 13.5–17.5)
MCH RBC QN AUTO: 31.1 PG (ref 26–34)
MCHC RBC AUTO-ENTMCNC: 33.7 G/DL (ref 31–36)
MCV RBC AUTO: 92.3 FL (ref 80–100)
PLATELET # BLD AUTO: 220 K/UL (ref 135–450)
PMV BLD AUTO: 7.9 FL (ref 5–10.5)
POC ACT LR: 149 SEC
POC ACT LR: 305 SEC
POC ACT LR: 324 SEC
POC ACT LR: 359 SEC
POC ACT LR: 360 SEC
POC ACT LR: 379 SEC
POC ACT LR: 387 SEC
POTASSIUM SERPL-SCNC: 3.9 MMOL/L (ref 3.5–5.1)
RBC # BLD AUTO: 4.11 M/UL (ref 4.2–5.9)
SODIUM SERPL-SCNC: 136 MMOL/L (ref 136–145)
WBC # BLD AUTO: 12.8 K/UL (ref 4–11)

## 2023-09-06 PROCEDURE — 2580000003 HC RX 258: Performed by: INTERNAL MEDICINE

## 2023-09-06 PROCEDURE — 85027 COMPLETE CBC AUTOMATED: CPT

## 2023-09-06 PROCEDURE — 99232 SBSQ HOSP IP/OBS MODERATE 35: CPT | Performed by: NURSE PRACTITIONER

## 2023-09-06 PROCEDURE — 93306 TTE W/DOPPLER COMPLETE: CPT

## 2023-09-06 PROCEDURE — 36415 COLL VENOUS BLD VENIPUNCTURE: CPT

## 2023-09-06 PROCEDURE — 6370000000 HC RX 637 (ALT 250 FOR IP): Performed by: INTERNAL MEDICINE

## 2023-09-06 PROCEDURE — 80048 BASIC METABOLIC PNL TOTAL CA: CPT

## 2023-09-06 RX ORDER — SUCRALFATE 1 G/1
1 TABLET ORAL EVERY 8 HOURS SCHEDULED
Qty: 42 TABLET | Refills: 0 | Status: SHIPPED | OUTPATIENT
Start: 2023-09-06 | End: 2023-09-20

## 2023-09-06 RX ORDER — HYDRALAZINE HYDROCHLORIDE 25 MG/1
25 TABLET, FILM COATED ORAL EVERY 8 HOURS SCHEDULED
Qty: 90 TABLET | Refills: 3 | Status: SHIPPED | OUTPATIENT
Start: 2023-09-06

## 2023-09-06 RX ORDER — AMIODARONE HYDROCHLORIDE 200 MG/1
100 TABLET ORAL DAILY
Qty: 15 TABLET | Refills: 3 | Status: SHIPPED
Start: 2023-09-07

## 2023-09-06 RX ORDER — PANTOPRAZOLE SODIUM 40 MG/1
40 TABLET, DELAYED RELEASE ORAL
Qty: 60 TABLET | Refills: 0 | Status: SHIPPED | OUTPATIENT
Start: 2023-09-06 | End: 2023-10-06

## 2023-09-06 RX ORDER — METOPROLOL SUCCINATE 50 MG/1
50 TABLET, EXTENDED RELEASE ORAL DAILY
Qty: 30 TABLET | Refills: 3 | Status: SHIPPED
Start: 2023-09-07

## 2023-09-06 RX ADMIN — PANTOPRAZOLE SODIUM 40 MG: 40 TABLET, DELAYED RELEASE ORAL at 05:06

## 2023-09-06 RX ADMIN — SUCRALFATE 1 G: 1 TABLET ORAL at 05:06

## 2023-09-06 RX ADMIN — APIXABAN 5 MG: 5 TABLET, FILM COATED ORAL at 09:19

## 2023-09-06 RX ADMIN — VALSARTAN 40 MG: 80 TABLET, FILM COATED ORAL at 09:19

## 2023-09-06 RX ADMIN — SODIUM CHLORIDE, PRESERVATIVE FREE 5 ML: 5 INJECTION INTRAVENOUS at 09:51

## 2023-09-06 RX ADMIN — METOPROLOL SUCCINATE 50 MG: 25 TABLET, EXTENDED RELEASE ORAL at 09:19

## 2023-09-06 RX ADMIN — AMIODARONE HYDROCHLORIDE 100 MG: 200 TABLET ORAL at 09:19

## 2023-09-06 RX ADMIN — HYDRALAZINE HYDROCHLORIDE 25 MG: 25 TABLET, FILM COATED ORAL at 05:06

## 2023-09-06 RX ADMIN — CLOPIDOGREL BISULFATE 75 MG: 75 TABLET ORAL at 09:19

## 2023-09-06 NOTE — PLAN OF CARE
Problem: Safety - Adult  Goal: Free from fall injury  9/6/2023 0019 by Dar Chang RN  Outcome: Progressing  Note: Pt will remain free from falls throughout hospital stay. Fall precautions in place, bed alarm on, bed in lowest position with wheels locked and side rails 2/4 up. Room door open and hourly rounding completed. Will continue to monitor throughout shift. Problem: Pain  Goal: Verbalizes/displays adequate comfort level or baseline comfort level  9/6/2023 0019 by Dar Chang RN  Outcome: Progressing  Note: Pt will be satisfied with pain control. Pt uses numeric pain rating scale with reassessments after pain med administration. Will continue to monitor progression throughout shift.       Problem: Discharge Planning  Goal: Discharge to home or other facility with appropriate resources  Outcome: Progressing

## 2023-09-06 NOTE — PLAN OF CARE
Problem: Chronic Conditions and Co-morbidities  Goal: Patient's chronic conditions and co-morbidity symptoms are monitored and maintained or improved  Outcome: Adequate for Discharge     Problem: Discharge Planning  Goal: Discharge to home or other facility with appropriate resources  9/6/2023 1222 by Beto Neal RN  Outcome: Adequate for Discharge     Problem: Pain  Goal: Verbalizes/displays adequate comfort level or baseline comfort level  9/6/2023 1222 by Beto Neal RN  Outcome: Adequate for Discharge     Problem: Safety - Adult  Goal: Free from fall injury  9/6/2023 1222 by Beto Neal RN  Outcome: Adequate for Discharge

## 2023-09-06 NOTE — DISCHARGE SUMMARY
Pt d/c'd home. Removed both IVs and stopped bleeding. Catheter intact. Pt tolerated well. No redness noted at site. Notified [facility] and removed tele box. Reviewed d/c instructions, home meds, and  f/u information utilizing teach-back method. Patient verbalized understanding. Pt picked up prescriptions from pharmacy.

## 2023-09-07 LAB — POC ACT LR: >400 SEC

## 2023-09-11 ENCOUNTER — OFFICE VISIT (OUTPATIENT)
Dept: INTERNAL MEDICINE CLINIC | Age: 63
End: 2023-09-11

## 2023-09-11 VITALS
DIASTOLIC BLOOD PRESSURE: 68 MMHG | OXYGEN SATURATION: 96 % | SYSTOLIC BLOOD PRESSURE: 90 MMHG | HEIGHT: 74 IN | BODY MASS INDEX: 26.05 KG/M2 | WEIGHT: 203 LBS | HEART RATE: 58 BPM

## 2023-09-11 DIAGNOSIS — I25.5 ISCHEMIC CARDIOMYOPATHY: ICD-10-CM

## 2023-09-11 DIAGNOSIS — I25.83 CORONARY ARTERY DISEASE DUE TO LIPID RICH PLAQUE: ICD-10-CM

## 2023-09-11 DIAGNOSIS — Z12.11 COLON CANCER SCREENING: Primary | ICD-10-CM

## 2023-09-11 DIAGNOSIS — I25.10 CORONARY ARTERY DISEASE DUE TO LIPID RICH PLAQUE: ICD-10-CM

## 2023-09-11 DIAGNOSIS — I48.0 PAROXYSMAL ATRIAL FIBRILLATION (HCC): ICD-10-CM

## 2023-09-11 DIAGNOSIS — E78.5 DYSLIPIDEMIA: ICD-10-CM

## 2023-09-11 PROCEDURE — 3074F SYST BP LT 130 MM HG: CPT | Performed by: INTERNAL MEDICINE

## 2023-09-11 PROCEDURE — 99214 OFFICE O/P EST MOD 30 MIN: CPT | Performed by: INTERNAL MEDICINE

## 2023-09-11 PROCEDURE — 3078F DIAST BP <80 MM HG: CPT | Performed by: INTERNAL MEDICINE

## 2023-09-11 NOTE — PATIENT INSTRUCTIONS
Paroxysmal atrial fibrillation after ablation:  Continue taking apixaban (Eliquis) 5 mg TWICE per day for anticoagulation and metoprolol succinate (Toprol XL) 50 mg ONCE per day in the morning. Continue taking amiodarone 100 mg (half tablet) ONCE per day for rhythm control. Esophageal irritation after transesophageal echocardiogram associated with ablation:  Continue taking pantoprazole (Protonix) 40 mg TWICE per day for 30 days and sucralfate (carafate) 1 gram tablet THREE times per day for three weeks. Coronary artery disease with ischemic cardiomyopathy:  Ejection fracture has improved from LOW at 20% 3/23/23) to NORMAL at 50-55% (9/6/23). Continue taking clopidogrel (Plavix) 75 mg ONCE per day and valsartan (Diovan) 40 mg (half tablet) ONCE per day. Since EF has increased above 35% will not need AICD. Dyslipidemia:  Continue taking atorvastatin (Lipitor) 40 mg every evening. Blood pressure low today at  90/68, but this seems to run low. No orthostatic symptoms. History of smoking and shortness of breath:  Normal spirometry (Apr 2023).         Follow-up in two [facility]  Health maintenance:  Colonoscopy for colon cancer screening

## 2023-09-11 NOTE — PROGRESS NOTES
SUBJECTIVE:  Follow up from 6/5/23 for paroxysmal atrial fibrillation. Hospitalized (9/5 - 9/6) s/p atrial fibrillation ablation (Dr Dada Viveros) at [facility]. He has been going to cardiac rehab three times per week and feels \"great\". Hospitalized (3/3 - 3/14) with atrial fibrillation with rapid ventricular response s/p SUSI and cardioversion (3/6). Echo with EF of 20%. Left heart catheterization showed severe multivessel CAD and ischemic cardiomyopathy. LAD and diagonal 2 branch treated with PCI and Impella (3/10 - 3/11). Taking clopidogrel 75 mg daily but not aspirin. He was hospitalized (3/23 - 3/24) with orthostatic hypotension and atrial fibrillation with RVR, adjusted dose of amiodarone to 200 mg once per day (down from BID). Entresto and spironolactone held. Stopped smoking Mar 3 after 40 pk/yr history of smoking.     MEDICATIONS:  amiodarone (CORDARONE) 200 MG tablet Take 0.5 tablets by mouth daily   hydrALAZINE (APRESOLINE) 25 MG tablet Take 1 tablet by mouth every 8 hours   pantoprazole (PROTONIX) 40 MG tablet Take 1 tablet by mouth 2 times daily (before meals)   sucralfate (CARAFATE) 1 GM tablet Take 1 tablet by mouth every 8 hours for 14 days   metoprolol succinate (TOPROL XL) 50 MG extended release tablet Take 1 tablet by mouth daily   valsartan (DIOVAN) 40 MG tablet Take 1 tablet by mouth daily   clopidogrel (PLAVIX) 75 MG tablet Take 1 tablet by mouth daily   atorvastatin (LIPITOR) 40 MG tablet Take 1 tablet by mouth nightly   apixaban (ELIQUIS) 5 MG TABS tablet Take 1 tablet by mouth 2 times daily       Lab Results   Component Value Date    WBC 12.8 (H) 09/06/2023    HGB 12.8 (L) 09/06/2023     09/06/2023    MCV 92.3 09/06/2023     Lab Results   Component Value Date     09/06/2023    K 3.9 09/06/2023     09/06/2023    CO2 20 (L) 09/06/2023    BUN 15 09/06/2023    CREATININE 0.9 09/06/2023    GLUCOSE 122 (H) 09/06/2023     Echocardiogram (3/23/23)  Left ventricular

## 2023-09-22 ENCOUNTER — TELEPHONE (OUTPATIENT)
Dept: CARDIOLOGY CLINIC | Age: 63
End: 2023-09-22

## 2023-09-22 NOTE — TELEPHONE ENCOUNTER
Pt requesting cardiac clearance for colonoscopy on 11/27/2023. Pt on plavix and eliquis.  Please advise      LOV FXW 7/17/2023

## 2023-09-28 NOTE — TELEPHONE ENCOUNTER
I would like to know if the procedure is urgent or can it wait till March 2024 which point he will be 1 year since his stent placement and Plavix can be held safely. Additionally, I want to know if colonoscopy can be performed while he continues his Plavix and/or Eliquis.

## 2023-10-13 ASSESSMENT — ENCOUNTER SYMPTOMS
HEMATEMESIS: 0
STRIDOR: 0
SHORTNESS OF BREATH: 0
HEMATOCHEZIA: 0
WHEEZING: 0
LEFT EYE: 0
RIGHT EYE: 0

## 2023-10-13 NOTE — PROGRESS NOTES
Assessment:     1. Atrial fibrillation: patient with recent first documented episode of atrial fibrillation. Associated symptoms: Dyspnea on exertion, Fatigue, and Shortness of breath      History of cardioversion: cardioversion twice in 2023  History of AF ablation: Had atrial fibrillation ablation September 2023  History of heart surgery/procedure: coronary angiography with coronary stent placement     Current use of anti-arrhythmic drugs: amiodarone   Previous use of anti-arrhythmic drugs: amiodarone      Overall LV function: Severe left ventricular systolic dysfunction (now low normal LVEF)  Size of left atrium: Moderately dilated LA size (now mildly dilated)  Significant cardiac valvular disease: No significant valve disease  Family history of atrial fibrillation: Yes, mother and others     Alcohol consumption: None recently  Caffeine consumption: Minimal caffeine intake recently (tea)  Smoking status: quit smoking after recent hospitalization  Obstructive sleep apnea: Suspected, not yet tested  Exercise status: Does not exercise regularly (sedentary lifestyle)     I have had discussions with the patient about issues related to atrial fibrillation (including etiology, disease progression patterns, stroke risk and rate control issues). Patient had his questions answered to his satisfaction. Patient has a [id number] score of at least 2 [Heart failure (1 point) and Hypertension (1 point)]  Longterm anticoagulation is: recommended  Current anticoagulation: apixaban  Bleeding issues reported: no  Renal function: Normal  Thyroid function:  Normal     Patient had cardioversion twice and both times reverted back to atrial fibrillation. He did well on oral amiodarone and reverted back to sinus rhythm. Feels much better in sinus rhythm. Jointly decided to proceed with AF ablation (in patients with HF/CM and AF, early intervention is warranted based on current evidence).      Procedure was done on 9/5/2023

## 2023-10-16 ENCOUNTER — HOSPITAL ENCOUNTER (OUTPATIENT)
Age: 63
Discharge: HOME | End: 2023-10-16
Payer: COMMERCIAL

## 2023-10-16 ENCOUNTER — OFFICE VISIT (OUTPATIENT)
Dept: CARDIOLOGY CLINIC | Age: 63
End: 2023-10-16
Payer: COMMERCIAL

## 2023-10-16 VITALS
WEIGHT: 212.4 LBS | HEART RATE: 53 BPM | BODY MASS INDEX: 27.26 KG/M2 | DIASTOLIC BLOOD PRESSURE: 74 MMHG | HEIGHT: 74 IN | SYSTOLIC BLOOD PRESSURE: 122 MMHG | OXYGEN SATURATION: 98 %

## 2023-10-16 DIAGNOSIS — I42.9 CARDIOMYOPATHY, UNSPECIFIED TYPE (HCC): ICD-10-CM

## 2023-10-16 DIAGNOSIS — Z51.81 ENCOUNTER FOR MONITORING AMIODARONE THERAPY: ICD-10-CM

## 2023-10-16 DIAGNOSIS — I25.5 ISCHEMIC CARDIOMYOPATHY: ICD-10-CM

## 2023-10-16 DIAGNOSIS — I10 PRIMARY HYPERTENSION: ICD-10-CM

## 2023-10-16 DIAGNOSIS — I50.22 CHRONIC SYSTOLIC HEART FAILURE (HCC): ICD-10-CM

## 2023-10-16 DIAGNOSIS — Z79.899 ENCOUNTER FOR MONITORING AMIODARONE THERAPY: ICD-10-CM

## 2023-10-16 DIAGNOSIS — I48.91 ATRIAL FIBRILLATION WITH RAPID VENTRICULAR RESPONSE (HCC): Primary | ICD-10-CM

## 2023-10-16 DIAGNOSIS — I48.91 ATRIAL FIBRILLATION WITH RAPID VENTRICULAR RESPONSE (HCC): ICD-10-CM

## 2023-10-16 LAB
ALBUMIN SERPL-MCNC: 4.5 G/DL (ref 3.4–5)
ALBUMIN/GLOB SERPL: 1.6 {RATIO} (ref 1.1–2.2)
ALP SERPL-CCNC: 88 U/L (ref 40–129)
ALT SERPL-CCNC: 10 U/L (ref 10–40)
ANION GAP SERPL CALCULATED.3IONS-SCNC: 8 MMOL/L (ref 3–16)
AST SERPL-CCNC: 20 U/L (ref 15–37)
BASOPHILS # BLD: 0.1 K/UL (ref 0–0.2)
BASOPHILS NFR BLD: 0.8 %
BILIRUB SERPL-MCNC: 0.4 MG/DL (ref 0–1)
BUN SERPL-MCNC: 19 MG/DL (ref 7–20)
CALCIUM SERPL-MCNC: 9.4 MG/DL (ref 8.3–10.6)
CHLORIDE SERPL-SCNC: 103 MMOL/L (ref 99–110)
CO2 SERPL-SCNC: 27 MMOL/L (ref 21–32)
CREAT SERPL-MCNC: 1 MG/DL (ref 0.8–1.3)
DEPRECATED RDW RBC AUTO: 13.7 % (ref 12.4–15.4)
EOSINOPHIL # BLD: 0.2 K/UL (ref 0–0.6)
EOSINOPHIL NFR BLD: 2.6 %
GFR SERPLBLD CREATININE-BSD FMLA CKD-EPI: >60 ML/MIN/{1.73_M2}
GLUCOSE SERPL-MCNC: 81 MG/DL (ref 70–99)
HCT VFR BLD AUTO: 39.8 % (ref 40.5–52.5)
HGB BLD-MCNC: 13.4 G/DL (ref 13.5–17.5)
LYMPHOCYTES # BLD: 1.4 K/UL (ref 1–5.1)
LYMPHOCYTES NFR BLD: 19.2 %
MCH RBC QN AUTO: 31.2 PG (ref 26–34)
MCHC RBC AUTO-ENTMCNC: 33.8 G/DL (ref 31–36)
MCV RBC AUTO: 92.2 FL (ref 80–100)
MONOCYTES # BLD: 0.6 K/UL (ref 0–1.3)
MONOCYTES NFR BLD: 7.6 %
NEUTROPHILS # BLD: 5.1 K/UL (ref 1.7–7.7)
NEUTROPHILS NFR BLD: 69.8 %
PLATELET # BLD AUTO: 256 K/UL (ref 135–450)
PMV BLD AUTO: 8.5 FL (ref 5–10.5)
POTASSIUM SERPL-SCNC: 4.1 MMOL/L (ref 3.5–5.1)
PROT SERPL-MCNC: 7.3 G/DL (ref 6.4–8.2)
RBC # BLD AUTO: 4.31 M/UL (ref 4.2–5.9)
SODIUM SERPL-SCNC: 138 MMOL/L (ref 136–145)
TSH SERPL DL<=0.005 MIU/L-ACNC: 0.83 UIU/ML (ref 0.27–4.2)
WBC # BLD AUTO: 7.3 K/UL (ref 4–11)

## 2023-10-16 PROCEDURE — 80053 COMPREHEN METABOLIC PANEL: CPT

## 2023-10-16 PROCEDURE — 3074F SYST BP LT 130 MM HG: CPT | Performed by: INTERNAL MEDICINE

## 2023-10-16 PROCEDURE — 99214 OFFICE O/P EST MOD 30 MIN: CPT | Performed by: INTERNAL MEDICINE

## 2023-10-16 PROCEDURE — 93000 ELECTROCARDIOGRAM COMPLETE: CPT | Performed by: INTERNAL MEDICINE

## 2023-10-16 PROCEDURE — 85025 COMPLETE CBC W/AUTO DIFF WBC: CPT

## 2023-10-16 PROCEDURE — 36415 COLL VENOUS BLD VENIPUNCTURE: CPT

## 2023-10-16 PROCEDURE — 84443 ASSAY THYROID STIM HORMONE: CPT

## 2023-10-16 PROCEDURE — 3078F DIAST BP <80 MM HG: CPT | Performed by: INTERNAL MEDICINE

## 2023-10-16 NOTE — PATIENT INSTRUCTIONS
Plan:     Recommend waiting until December for COVID booster (3 months after ablation)   Labs-TSH, CMP, CBC  Continue current cardiac medications as prescribed  Follow up with me in mid December

## 2023-11-16 ENCOUNTER — OFFICE VISIT (OUTPATIENT)
Dept: INTERNAL MEDICINE CLINIC | Age: 63
End: 2023-11-16

## 2023-11-16 VITALS — BODY MASS INDEX: 27.21 KG/M2 | OXYGEN SATURATION: 98 % | HEIGHT: 74 IN | WEIGHT: 212 LBS | HEART RATE: 56 BPM

## 2023-11-16 DIAGNOSIS — E78.5 DYSLIPIDEMIA: ICD-10-CM

## 2023-11-16 DIAGNOSIS — I25.83 CORONARY ARTERY DISEASE DUE TO LIPID RICH PLAQUE: ICD-10-CM

## 2023-11-16 DIAGNOSIS — I25.10 CORONARY ARTERY DISEASE DUE TO LIPID RICH PLAQUE: ICD-10-CM

## 2023-11-16 DIAGNOSIS — I48.0 PAROXYSMAL ATRIAL FIBRILLATION (HCC): Primary | ICD-10-CM

## 2023-11-16 DIAGNOSIS — I25.5 ISCHEMIC CARDIOMYOPATHY: ICD-10-CM

## 2023-11-16 PROCEDURE — 99214 OFFICE O/P EST MOD 30 MIN: CPT | Performed by: INTERNAL MEDICINE

## 2023-11-16 NOTE — PROGRESS NOTES
SUBJECTIVE:  Follow up from 9/11 for paroxysmal atrial fibrillation s/p ablation (9/5, Dr Shikha Matthews) at [facility]. Completed cardiac rehab, walking 3-4 miles per day at work. Seen by cardiology (10/16, Dr Shikha Matthews). Esophageal irritation after transesophageal echocardiogram associated with ablation has resolved after taking pantoprazole (Protonix) 40 mg TWICE per day for 30 days and sucralfate (carafate) 1 gram tablet THREE times per day for three weeks. History of present illness:    Hospitalized (9/5 - 9/6) s/p atrial fibrillation ablation (Dr Shikha Matthews) at [facility]. He has been going to cardiac rehab three times per week and feels \"great\". Hospitalized (3/3 - 3/14) with atrial fibrillation with rapid ventricular response s/p SUSI and cardioversion (3/6). Echo with EF of 20%. Left heart catheterization showed severe multivessel CAD and ischemic cardiomyopathy. LAD and diagonal 2 branch treated with PCI and Impella (3/10 - 3/11). Taking clopidogrel 75 mg daily but not aspirin. He was hospitalized (3/23 - 3/24) with orthostatic hypotension and atrial fibrillation with RVR, adjusted dose of amiodarone to 200 mg once per day (down from BID). Entresto and spironolactone held. Stopped smoking Mar 3 after 40 pk/yr history of smoking.       MEDICATIONS:  amiodarone (CORDARONE) 200 MG tablet Take 0.5 tablets by mouth daily   metoprolol succinate (TOPROL XL) 50 MG tablet Take 1 tablet by mouth daily   valsartan (DIOVAN) 40 MG tablet Take 1 tablet by mouth daily   clopidogrel (PLAVIX) 75 MG tablet Take 1 tablet by mouth daily   atorvastatin (LIPITOR) 40 MG tablet Take 1 tablet by mouth nightly   apixaban (ELIQUIS) 5 MG TABS tablet Take 1 tablet by mouth 2 times daily           Lab Results   Component Value Date    LABA1C 5.4 03/03/2023     Lab Results   Component Value Date    WBC 7.3 10/16/2023    HGB 13.4 (L) 10/16/2023     10/16/2023    MCV 92.2 10/16/2023     Lab Results   Component Value

## 2023-11-16 NOTE — PATIENT INSTRUCTIONS
Paroxysmal atrial fibrillation after ablation:  Continue taking apixaban (Eliquis) 5 mg TWICE per day for anticoagulation and metoprolol succinate (Toprol XL) 50 mg ONCE per day in the morning. Continue taking amiodarone 100 mg (half tablet) ONCE per day for rhythm control with medication managed by Dr Carol Chi. Coronary artery disease with ischemic cardiomyopathy:  Ejection fracture has improved from 20% (March 2023) 50-55% (Sept 2023). Continue taking clopidogrel (Plavix) 75 mg ONCE per day and valsartan (Diovan) 40 mg (half tablet) ONCE per day. Since EF has increased above 35% will not need AICD. Dyslipidemia:  Continue taking atorvastatin (Lipitor) 40 mg every evening. Allergic rhinitis: This is causing post nasal drainage induced cough and sore throat, especially at night. Use Flonase (fluticasone) nasal spray, two sprays in each nostril every night at bedtime and use nasal saline (Ocean spray) before the Flonase at bedtime and several times during the day especially in the morning to keep nasal secretions from drying out. Allergic symptoms and upper respiratory infection:  Take loratadine (Claritin) 10-30 mg every morning. History of smoking:  Normal spirometry (Apr 2023).        Follow-up in six months  Health maintenance:  Colonoscopy for colon cancer screening in March 2024 after completion of six months of anticoagulation

## 2023-11-28 ASSESSMENT — ENCOUNTER SYMPTOMS
LEFT EYE: 0
RIGHT EYE: 0
HEMATEMESIS: 0
STRIDOR: 0
WHEEZING: 0
HEMATOCHEZIA: 0
SHORTNESS OF BREATH: 0

## 2023-11-28 NOTE — PROGRESS NOTES
Assessment:     1. Atrial fibrillation: patient with recent first documented episode of atrial fibrillation. Associated symptoms: Dyspnea on exertion, Fatigue, and Shortness of breath      History of cardioversion: cardioversion twice in 2023  History of AF ablation: Had atrial fibrillation ablation September 2023  History of heart surgery/procedure: coronary angiography with coronary stent placement     Current use of anti-arrhythmic drugs: amiodarone   Previous use of anti-arrhythmic drugs: amiodarone      Overall LV function: Severe left ventricular systolic dysfunction (now low normal LVEF)  Size of left atrium: Moderately dilated LA size (now mildly dilated)  Significant cardiac valvular disease: No significant valve disease  Family history of atrial fibrillation: Yes, mother and others     Alcohol consumption: None recently  Caffeine consumption: Minimal caffeine intake recently (tea)  Smoking status: quit smoking after recent hospitalization  Obstructive sleep apnea: Suspected, not yet tested  Exercise status: walks regularly     I have had discussions with the patient about issues related to atrial fibrillation (including etiology, disease progression patterns, stroke risk and rate control issues). Patient had his questions answered to his satisfaction. Patient has a [id number] score of at least 2 [Heart failure (1 point) and Hypertension (1 point)]  Longterm anticoagulation is: recommended  Current anticoagulation: apixaban  Bleeding issues reported: no  Renal function: Normal  Thyroid function:  Normal     Patient had cardioversion twice and both times reverted back to atrial fibrillation. He did well on oral amiodarone and reverted back to sinus rhythm. Feels much better in sinus rhythm. Jointly decided to proceed with AF ablation (in patients with HF/CM and AF, early intervention is warranted based on current evidence).      Procedure was done on 9/5/2023 with RF ablation accomplishing

## 2023-11-29 ENCOUNTER — OFFICE VISIT (OUTPATIENT)
Dept: CARDIOLOGY CLINIC | Age: 63
End: 2023-11-29
Payer: COMMERCIAL

## 2023-11-29 VITALS
HEART RATE: 55 BPM | BODY MASS INDEX: 26.95 KG/M2 | OXYGEN SATURATION: 99 % | DIASTOLIC BLOOD PRESSURE: 74 MMHG | WEIGHT: 210 LBS | HEIGHT: 74 IN | SYSTOLIC BLOOD PRESSURE: 130 MMHG

## 2023-11-29 DIAGNOSIS — I48.91 ATRIAL FIBRILLATION WITH RAPID VENTRICULAR RESPONSE (HCC): Primary | ICD-10-CM

## 2023-11-29 DIAGNOSIS — I25.5 ISCHEMIC CARDIOMYOPATHY: ICD-10-CM

## 2023-11-29 DIAGNOSIS — I50.22 CHRONIC SYSTOLIC HEART FAILURE (HCC): ICD-10-CM

## 2023-11-29 DIAGNOSIS — I10 ESSENTIAL HYPERTENSION: ICD-10-CM

## 2023-11-29 PROCEDURE — 99214 OFFICE O/P EST MOD 30 MIN: CPT | Performed by: INTERNAL MEDICINE

## 2023-11-29 PROCEDURE — 3075F SYST BP GE 130 - 139MM HG: CPT | Performed by: INTERNAL MEDICINE

## 2023-11-29 PROCEDURE — 93000 ELECTROCARDIOGRAM COMPLETE: CPT | Performed by: INTERNAL MEDICINE

## 2023-11-29 PROCEDURE — 3078F DIAST BP <80 MM HG: CPT | Performed by: INTERNAL MEDICINE

## 2023-11-29 RX ORDER — AMIODARONE HYDROCHLORIDE 200 MG/1
TABLET ORAL
Qty: 15 TABLET | Refills: 3 | Status: SHIPPED | OUTPATIENT
Start: 2023-11-29

## 2023-11-29 NOTE — PATIENT INSTRUCTIONS
Plan:     Take Amiodarone 100 mg Monday through Thursday. Do not take on Friday through Sunday. Follow this regimen now through January. Plan for four week cardiac event monitor in 3 months. Continue taking other current cardiac medications as prescribed  Follow up with me in 4 months.

## 2023-12-04 NOTE — PROGRESS NOTES
[facility] - Daily Progress/Follow-up Note        REASON FOR FOLLOW UP  CAD, ischemic cardiomyopathy      INTERVAL HISTORY  Mr. Mckinnon is a 63 y.o. male presenting for hospital follow up. He has a medical history of AFib with RVR, hypertension, CAD s/p complex PCI, pulmonary hypertension, hyperlipidemia, severe ischemic cardiomyopathy.     He was admitted 3/3/2023-3/14/0223 for shortness of breath, fatigue. He was found to be in Afib with RVR. Echo 3/3/2023 EF 15-20%, moderate bi-atrial enlargement, mild TR, mild to moderate MR. 3/6/2023 SUSI with cardioversion performed for Afib.  Coronary angiogram 3/7/2023 showed severe two vessel CAD suggesting ischemic cardiomyopathy, severe elevated left sided filling pressures suggestive of volume overload. On 3/10/2023 Successful external electrical cardioversion. On 3/10/2023 coronary angiogram via right femoral approach impella supported performed PCI to mid to distal LAD with LOLA, PCI diagonal 2 with POBA, Distal aortic and iliofemoral arterial angiogram, PTA to proximal right common iliac artery, Impella removed and patient extubated next day.  Overall successful procedure without any complications.  He was discharged on guideline directed medical therapy for CAD, cardiomyopathy, A-fib.     OV, 3/23/2023,  he presented for first cardiology outpatient visit with increased weakness and fatigue as well as lightheadedness.  Was found to be hypotensive.  He works at a [employer] and the first day went back to work he was feeling extremely tired and lightheaded so could not continue work. He was noted to be hypotensive with systolic blood pressure in the 80's. From the office, he was sent to emergency department due to concern for cardiac medication related hypotension as well as suspected volume depletion and dehydration.     Admitted 3/23/2023-3/24/2023 from clinic for weakness and shortness of breath. Following admission his home metoprolol XL dosing was

## 2023-12-06 ENCOUNTER — OFFICE VISIT (OUTPATIENT)
Dept: CARDIOLOGY CLINIC | Age: 63
End: 2023-12-06

## 2023-12-06 VITALS
SYSTOLIC BLOOD PRESSURE: 144 MMHG | WEIGHT: 216 LBS | BODY MASS INDEX: 27.72 KG/M2 | HEIGHT: 74 IN | OXYGEN SATURATION: 96 % | DIASTOLIC BLOOD PRESSURE: 66 MMHG | HEART RATE: 57 BPM

## 2023-12-06 DIAGNOSIS — I48.91 ATRIAL FIBRILLATION WITH RVR (HCC): ICD-10-CM

## 2023-12-06 DIAGNOSIS — I42.9 CARDIOMYOPATHY, UNSPECIFIED TYPE (HCC): ICD-10-CM

## 2023-12-06 DIAGNOSIS — I25.811 CORONARY ARTERY DISEASE INVOLVING NATIVE ARTERY OF TRANSPLANTED HEART WITHOUT ANGINA PECTORIS: Primary | ICD-10-CM

## 2023-12-06 DIAGNOSIS — I10 UNCONTROLLED HYPERTENSION: ICD-10-CM

## 2023-12-06 DIAGNOSIS — I50.22 CHRONIC SYSTOLIC HEART FAILURE (HCC): ICD-10-CM

## 2023-12-06 DIAGNOSIS — Z78.9 ALCOHOL USE: ICD-10-CM

## 2023-12-06 DIAGNOSIS — F17.200 CURRENT SMOKER: ICD-10-CM

## 2023-12-06 DIAGNOSIS — I25.5 ISCHEMIC CARDIOMYOPATHY: ICD-10-CM

## 2023-12-06 DIAGNOSIS — I27.20 PULMONARY HYPERTENSION (HCC): ICD-10-CM

## 2023-12-06 DIAGNOSIS — I25.110 CORONARY ARTERY DISEASE INVOLVING NATIVE CORONARY ARTERY OF NATIVE HEART WITH UNSTABLE ANGINA PECTORIS (HCC): ICD-10-CM

## 2023-12-06 DIAGNOSIS — E66.3 OVERWEIGHT (BMI 25.0-29.9): ICD-10-CM

## 2023-12-06 RX ORDER — ATORVASTATIN CALCIUM 40 MG/1
40 TABLET, FILM COATED ORAL NIGHTLY
Qty: 90 TABLET | Refills: 3 | Status: SHIPPED | OUTPATIENT
Start: 2023-12-06 | End: 2023-12-28

## 2023-12-06 RX ORDER — VALSARTAN 80 MG/1
80 TABLET ORAL DAILY
Qty: 90 TABLET | Refills: 3 | Status: SHIPPED | OUTPATIENT
Start: 2023-12-06

## 2023-12-06 NOTE — PATIENT INSTRUCTIONS
PLAN:  Increase valsartan 80 mg daily for improved blood pressure  Continue all other cardiac medications  Continue to monitor blood pressure at home  No further cardiac testing  Follow up in 6 months

## 2023-12-11 ENCOUNTER — TELEPHONE (OUTPATIENT)
Dept: CARDIOLOGY CLINIC | Age: 63
End: 2023-12-11

## 2023-12-11 NOTE — TELEPHONE ENCOUNTER
Received cardiac clearance from [facility]. Pt is scheduled on 3/19/2024 for colonoscopy. Pt is requested to hold eliquis 2-4 days prior to procedure.      Last ov : 11/29/2023  EKG 11/29/2023    [phone]

## 2023-12-11 NOTE — TELEPHONE ENCOUNTER
Shar Pace MD   to Me     12/11/23  2:25 PM  Patient is of acceptable risk, from a cardiac electrophysiology standpoint, to undergo the planned GI procedure. If deemed necessary, by the physician performing the procedure, to hold anticoagulation then would recommend minimizing interruptions of anticoagulation. Would recommend holding apixaban for 48 hours prior to procedure and then resuming it as soon as possible afterwards if no bleeding issues. Letter created and faxed as requested. Left message on pt vm per HIPAA.

## 2023-12-27 DIAGNOSIS — I25.110 CORONARY ARTERY DISEASE INVOLVING NATIVE CORONARY ARTERY OF NATIVE HEART WITH UNSTABLE ANGINA PECTORIS (HCC): ICD-10-CM

## 2023-12-27 RX ORDER — CLOPIDOGREL BISULFATE 75 MG/1
75 TABLET ORAL DAILY
Qty: 90 TABLET | Refills: 2 | Status: SHIPPED | OUTPATIENT
Start: 2023-12-27

## 2023-12-28 RX ORDER — ATORVASTATIN CALCIUM 40 MG/1
40 TABLET, FILM COATED ORAL NIGHTLY
Qty: 90 TABLET | Refills: 2 | Status: SHIPPED | OUTPATIENT
Start: 2023-12-28

## 2024-01-03 NOTE — PLAN OF CARE
Patient's EF (Ejection Fraction) is less than 40%    Heart Failure Medications:  Diuretics[de-identified] Furosemide    (One of the following REQUIRED for EF </= 40%/SYSTOLIC FAILURE but MAY be used in EF% >40%/DIASTOLIC FAILURE)        ACE[de-identified] None        ARB[de-identified] None         ARNI[de-identified] None    (Beta Blockers)  NON- Evidenced Based Beta Blocker (for EF% >40%/DIASTOLIC FAILURE): None    Evidenced Based Beta Blocker::(REQUIRED for EF% <40%/SYSTOLIC FAILURE) Metoprolol SUCCinate- Toprol XL  . .................................................................................................................................................. Patient's weights and intake/output reviewed: Yes    Patient's Last Weight: 202 lbs obtained by standing scale. Difference of 5 lbs less than last documented weight. Intake/Output Summary (Last 24 hours) at 3/8/2023 2230  Last data filed at 3/8/2023 8699  Gross per 24 hour   Intake 960 ml   Output 3475 ml   Net -2515 ml       Daily Weight log at bedside and being used: Uniplaces Provided: yes    Comorbidities Reviewed Yes    Patient has no past medical history on file. >>For CHF and Comorbidity documentation on Education Time and Topics, please see Education Tab    Progressive Mobility Assessment:  What is this patient's Current Level of Mobility?: Ambulatory-Up Ad Astrid  How was this patient Mobilized today?: Edge of Bed, Up to Chair, Bedside Commode,  Up to Toilet/Shower, Up in Room, and Up in Hallway, ambulated 100 ft                 With Whom? Nurse, PCA, PT, OT, and Self                 Level of Difficulty/Assistance: Independent     Pt resting in bed at this time on room air. Pt denies shortness of breath. Pt without lower extremity edema.      Patient and/or Family's stated Goal of Care this Admission: reduce shortness of breath, increase activity tolerance, better understand heart failure and disease management, be more comfortable, and reduce lower extremity edema prior to discharge        : N/A

## 2024-02-27 ENCOUNTER — NURSE ONLY (OUTPATIENT)
Dept: CARDIOLOGY CLINIC | Age: 64
End: 2024-02-27

## 2024-02-27 ENCOUNTER — TELEPHONE (OUTPATIENT)
Dept: CARDIOLOGY CLINIC | Age: 64
End: 2024-02-27

## 2024-02-27 DIAGNOSIS — I48.91 ATRIAL FIBRILLATION WITH RAPID VENTRICULAR RESPONSE (HCC): Primary | ICD-10-CM

## 2024-02-27 NOTE — TELEPHONE ENCOUNTER
Monitor placed by [facility]  Monitor company [employer]  Length of monitor 28 days  Monitor ordered by [facility]  Serial number [id number]  Kit ID [id number]  Activation successful prior to pt leaving office? Yes

## 2024-03-21 RX ORDER — METOPROLOL SUCCINATE 50 MG/1
50 TABLET, EXTENDED RELEASE ORAL 2 TIMES DAILY
Qty: 180 TABLET | Refills: 1 | Status: SHIPPED | OUTPATIENT
Start: 2024-03-21

## 2024-03-26 NOTE — PROGRESS NOTES
of 15-20%. On 3/6/2023 he had a SUSI and underwent successful cardioversion. On 3/7/2023 he underwent cardiac catheterization that showed severely elevated left sided filling pressures and severe two vessel disease, PCI not pursued due to hypoxia. He went back into atrial fibrillation on 3/9/2023. He was started on amiodarone therapy. On 3/10/2023 he underwent successful cardioversion. On 3/10/2023 he underwent cardiac catheterization s/p PCI LAD. He was admitted 3/23/2023-3/24/2023 with fatigue, orthostatic lightheadedness and dizziness. Amiodarone was reduced to 200mg once daily.     Office Visit ([facility], 4/5/2023):  He presents today as a hospital follow up to discuss arrhythmia management. Patient reports his prior fatigue has improved some, still has fatigue with exertion. He has not used any tobacco products or alcohol since his discharge. He has been working on improving his diet. Patient denies current edema, chest pain, shortness of breath, palpitations, dizziness or syncope. He had one episode of a nose bleed, otherwise has not had any issues with bleeding. Patient is taking all cardiac medications as prescribed and tolerates them well.     He presented to [facility] on 04/17/2023 for a planned cardioversion and was found to be in sinus rhythm/sinus bradycardia. The procedure was cancelled and he was discharged home. Amiodarone was reduced to 100 mg daily.     Office Visit ([facility], 06/14/2023):  He presents today for follow up. He reports that he is feeling well overall. Patient denies current edema, chest pain, shortness of breath, palpitations, dizziness or syncope. Patient is taking all cardiac medications as prescribed and tolerates them well. He denies any recent issues with bleeding or bruising.     His caffeine consumption is minimal. He denies alcohol and tobacco usage.     On 9/5/2023 he underwent successful isolation of the pulmonary veins for ablation of atrial fibrillation.    Office Visit (EP

## 2024-04-03 ENCOUNTER — OFFICE VISIT (OUTPATIENT)
Dept: CARDIOLOGY CLINIC | Age: 64
End: 2024-04-03
Payer: COMMERCIAL

## 2024-04-03 VITALS
HEART RATE: 58 BPM | DIASTOLIC BLOOD PRESSURE: 86 MMHG | WEIGHT: 217.6 LBS | BODY MASS INDEX: 27.93 KG/M2 | SYSTOLIC BLOOD PRESSURE: 136 MMHG | HEIGHT: 74 IN | OXYGEN SATURATION: 98 %

## 2024-04-03 DIAGNOSIS — Z86.79 STATUS POST ABLATION OF ATRIAL FIBRILLATION: ICD-10-CM

## 2024-04-03 DIAGNOSIS — I25.5 ISCHEMIC CARDIOMYOPATHY: ICD-10-CM

## 2024-04-03 DIAGNOSIS — Z79.899 ENCOUNTER FOR MONITORING AMIODARONE THERAPY: ICD-10-CM

## 2024-04-03 DIAGNOSIS — Z98.890 STATUS POST ABLATION OF ATRIAL FIBRILLATION: ICD-10-CM

## 2024-04-03 DIAGNOSIS — Z51.81 ENCOUNTER FOR MONITORING AMIODARONE THERAPY: ICD-10-CM

## 2024-04-03 DIAGNOSIS — I48.91 ATRIAL FIBRILLATION WITH RVR (HCC): ICD-10-CM

## 2024-04-03 DIAGNOSIS — I48.0 PAF (PAROXYSMAL ATRIAL FIBRILLATION) (HCC): Primary | ICD-10-CM

## 2024-04-03 DIAGNOSIS — I42.9 CARDIOMYOPATHY, UNSPECIFIED TYPE (HCC): ICD-10-CM

## 2024-04-03 PROCEDURE — 3075F SYST BP GE 130 - 139MM HG: CPT | Performed by: INTERNAL MEDICINE

## 2024-04-03 PROCEDURE — 93228 REMOTE 30 DAY ECG REV/REPORT: CPT | Performed by: INTERNAL MEDICINE

## 2024-04-03 PROCEDURE — 93000 ELECTROCARDIOGRAM COMPLETE: CPT | Performed by: INTERNAL MEDICINE

## 2024-04-03 PROCEDURE — 3079F DIAST BP 80-89 MM HG: CPT | Performed by: INTERNAL MEDICINE

## 2024-04-03 PROCEDURE — 99214 OFFICE O/P EST MOD 30 MIN: CPT | Performed by: INTERNAL MEDICINE

## 2024-04-03 NOTE — PATIENT INSTRUCTIONS
Plan:     Stop Amiodarone.   Can proceed with planned dental work and hold Eliquis 48 hours prior if needed.    Continue taking other current cardiac medications as prescribed  Follow up with me in 4 months.

## 2024-04-23 DIAGNOSIS — I48.91 ATRIAL FIBRILLATION WITH RAPID VENTRICULAR RESPONSE (HCC): ICD-10-CM

## 2024-05-07 ENCOUNTER — TELEPHONE (OUTPATIENT)
Dept: CARDIOLOGY CLINIC | Age: 64
End: 2024-05-07

## 2024-05-07 NOTE — TELEPHONE ENCOUNTER
Last office visit [facility] 4/3/2024  Upcoming OV [facility] 7/31/2024    [facility]-please review and advise on clearance and medication holding. Also a patient of Dr. Junior, would you like him to advise on holding of Plavix?

## 2024-05-07 NOTE — TELEPHONE ENCOUNTER
Pt presented himself in with ppw for a procedure:    CARDIAC CLEARANCE REQUEST    What type of procedure are you havin.14  Are you taking any blood thinners:  Eliquis and Plavix, hold for 7-10days  Type on anesthesia:  Oral sedation  When is your procedure scheduled for:  Extracting teeth  What physician is performing your procedure:  Dr Abraham Chauhan  Phone Number:  [phone]  Fax number to send the letter:   [phone]    Can be faxed and pt can pick it up as well. Pt stated faxing the ppw over would be easier for him

## 2024-05-09 ENCOUNTER — TELEPHONE (OUTPATIENT)
Dept: CARDIOLOGY CLINIC | Age: 64
End: 2024-05-09

## 2024-05-09 NOTE — TELEPHONE ENCOUNTER
Se farfan MD  [facility] Cardio Practice Staff2 days ago       Okay to hold Plavix for 7 to 10 days for tooth extraction and resume afterwards.          Letter created and faxed

## 2024-05-13 SDOH — ECONOMIC STABILITY: INCOME INSECURITY: HOW HARD IS IT FOR YOU TO PAY FOR THE VERY BASICS LIKE FOOD, HOUSING, MEDICAL CARE, AND HEATING?: NOT VERY HARD

## 2024-05-13 SDOH — ECONOMIC STABILITY: FOOD INSECURITY: WITHIN THE PAST 12 MONTHS, THE FOOD YOU BOUGHT JUST DIDN'T LAST AND YOU DIDN'T HAVE MONEY TO GET MORE.: NEVER TRUE

## 2024-05-13 SDOH — ECONOMIC STABILITY: FOOD INSECURITY: WITHIN THE PAST 12 MONTHS, YOU WORRIED THAT YOUR FOOD WOULD RUN OUT BEFORE YOU GOT MONEY TO BUY MORE.: NEVER TRUE

## 2024-05-13 ASSESSMENT — PATIENT HEALTH QUESTIONNAIRE - PHQ9
SUM OF ALL RESPONSES TO PHQ QUESTIONS 1-9: 0
SUM OF ALL RESPONSES TO PHQ9 QUESTIONS 1 & 2: 0
2. FEELING DOWN, DEPRESSED OR HOPELESS: NOT AT ALL
SUM OF ALL RESPONSES TO PHQ9 QUESTIONS 1 & 2: 0
SUM OF ALL RESPONSES TO PHQ QUESTIONS 1-9: 0
1. LITTLE INTEREST OR PLEASURE IN DOING THINGS: NOT AT ALL
1. LITTLE INTEREST OR PLEASURE IN DOING THINGS: NOT AT ALL
2. FEELING DOWN, DEPRESSED OR HOPELESS: NOT AT ALL

## 2024-05-16 ENCOUNTER — HOSPITAL ENCOUNTER (OUTPATIENT)
Age: 64
Discharge: HOME | End: 2024-05-16
Payer: COMMERCIAL

## 2024-05-16 ENCOUNTER — OFFICE VISIT (OUTPATIENT)
Dept: INTERNAL MEDICINE CLINIC | Age: 64
End: 2024-05-16

## 2024-05-16 VITALS
WEIGHT: 220 LBS | OXYGEN SATURATION: 99 % | DIASTOLIC BLOOD PRESSURE: 63 MMHG | HEIGHT: 74 IN | SYSTOLIC BLOOD PRESSURE: 97 MMHG | HEART RATE: 57 BPM | BODY MASS INDEX: 28.23 KG/M2

## 2024-05-16 DIAGNOSIS — I48.0 PAROXYSMAL ATRIAL FIBRILLATION (HCC): ICD-10-CM

## 2024-05-16 DIAGNOSIS — E78.5 DYSLIPIDEMIA: ICD-10-CM

## 2024-05-16 DIAGNOSIS — I25.5 ISCHEMIC CARDIOMYOPATHY: ICD-10-CM

## 2024-05-16 DIAGNOSIS — N52.01 ERECTILE DYSFUNCTION DUE TO ARTERIAL INSUFFICIENCY: ICD-10-CM

## 2024-05-16 DIAGNOSIS — E78.5 DYSLIPIDEMIA: Primary | ICD-10-CM

## 2024-05-16 DIAGNOSIS — I25.83 CORONARY ARTERY DISEASE DUE TO LIPID RICH PLAQUE: ICD-10-CM

## 2024-05-16 DIAGNOSIS — I25.10 CORONARY ARTERY DISEASE DUE TO LIPID RICH PLAQUE: ICD-10-CM

## 2024-05-16 LAB
CHOLEST SERPL-MCNC: 106 MG/DL (ref 0–199)
HDLC SERPL-MCNC: 39 MG/DL (ref 40–60)
LDLC SERPL CALC-MCNC: 46 MG/DL
TRIGL SERPL-MCNC: 107 MG/DL (ref 0–150)
VLDLC SERPL CALC-MCNC: 21 MG/DL

## 2024-05-16 PROCEDURE — 36415 COLL VENOUS BLD VENIPUNCTURE: CPT

## 2024-05-16 PROCEDURE — 3078F DIAST BP <80 MM HG: CPT | Performed by: INTERNAL MEDICINE

## 2024-05-16 PROCEDURE — 3074F SYST BP LT 130 MM HG: CPT | Performed by: INTERNAL MEDICINE

## 2024-05-16 PROCEDURE — 80061 LIPID PANEL: CPT

## 2024-05-16 PROCEDURE — 99214 OFFICE O/P EST MOD 30 MIN: CPT | Performed by: INTERNAL MEDICINE

## 2024-05-16 RX ORDER — SILDENAFIL 50 MG/1
50 TABLET, FILM COATED ORAL DAILY PRN
Qty: 10 TABLET | Refills: 0 | Status: SHIPPED | OUTPATIENT
Start: 2024-05-16

## 2024-05-16 NOTE — PROGRESS NOTES
Ejection fracture has improved from 20% (March 2023) 50-55% (Sept 2023).  Continue taking clopidogrel (Plavix) 75 mg ONCE per day and valsartan (Diovan) 40 mg (half tablet) ONCE per day.  Since EF has increased above 35% will not need AICD.    Dyslipidemia:  Continue taking atorvastatin (Lipitor) 40 mg every evening.   Check lipid panel.    Erectile dysfunction:  Try taking sildenafil (Viagra) 50 mg tablet about 30 minutes prior to anticipated intercourse.  Avoid ANY nitrates or nitroglycerin within 48 hours of taking sildenafil.   History of smoking (stopped March 2023):  Normal spirometry (Apr 2023).      Follow-up in three months

## 2024-05-16 NOTE — PATIENT INSTRUCTIONS
Paroxysmal atrial fibrillation after ablation:  Continue taking apixaban (Eliquis) 5 mg TWICE per day for anticoagulation and metoprolol succinate (Toprol XL) 50 mg ONCE per day in the morning.    Coronary artery disease with ischemic cardiomyopathy:  Ejection fracture has improved from 20% (March 2023) 50-55% (Sept 2023).  Continue taking clopidogrel (Plavix) 75 mg ONCE per day and valsartan (Diovan) 40 mg (half tablet) ONCE per day.  Since EF has increased above 35% will not need AICD.    Dyslipidemia:  Continue taking atorvastatin (Lipitor) 40 mg every evening.   Check lipid panel.    Erectile dysfunction:  Try taking sildenafil (Viagra) 50 mg tablet about 30 minutes prior to anticipated intercourse.  Avoid ANY nitrates or nitroglycerin within 48 hours of taking sildenafil.   History of smoking (stopped March 2023):  Normal spirometry (Apr 2023).      Follow-up in three months

## 2024-05-19 ENCOUNTER — HOSPITAL ENCOUNTER (EMERGENCY)
Age: 64
Discharge: HOME | End: 2024-05-19
Attending: STUDENT IN AN ORGANIZED HEALTH CARE EDUCATION/TRAINING PROGRAM
Payer: COMMERCIAL

## 2024-05-19 VITALS
TEMPERATURE: 97.9 F | OXYGEN SATURATION: 99 % | HEART RATE: 87 BPM | RESPIRATION RATE: 16 BRPM | SYSTOLIC BLOOD PRESSURE: 109 MMHG | DIASTOLIC BLOOD PRESSURE: 77 MMHG

## 2024-05-19 DIAGNOSIS — K91.840 SURGICAL WOUND HEMORRHAGE AFTER DENTAL PROCEDURE: Primary | ICD-10-CM

## 2024-05-19 DIAGNOSIS — Z98.818 SURGICAL WOUND HEMORRHAGE AFTER DENTAL PROCEDURE: Primary | ICD-10-CM

## 2024-05-19 PROCEDURE — 2500000003 HC RX 250 WO HCPCS: Performed by: STUDENT IN AN ORGANIZED HEALTH CARE EDUCATION/TRAINING PROGRAM

## 2024-05-19 PROCEDURE — 2500000003 HC RX 250 WO HCPCS

## 2024-05-19 PROCEDURE — 99283 EMERGENCY DEPT VISIT LOW MDM: CPT

## 2024-05-19 RX ORDER — TRANEXAMIC ACID 100 MG/ML
2000 INJECTION, SOLUTION INTRAVENOUS ONCE
Status: COMPLETED | OUTPATIENT
Start: 2024-05-19 | End: 2024-05-19

## 2024-05-19 RX ORDER — TRANEXAMIC ACID 100 MG/ML
INJECTION, SOLUTION INTRAVENOUS
Status: COMPLETED
Start: 2024-05-19 | End: 2024-05-19

## 2024-05-19 RX ORDER — TRANEXAMIC ACID 100 MG/ML
15 INJECTION, SOLUTION INTRAVENOUS ONCE
Status: COMPLETED | OUTPATIENT
Start: 2024-05-19 | End: 2024-05-19

## 2024-05-19 RX ADMIN — TRANEXAMIC ACID 1497 MG: 100 INJECTION, SOLUTION INTRAVENOUS at 03:22

## 2024-05-19 RX ADMIN — TRANEXAMIC ACID 2000 MG: 100 INJECTION, SOLUTION INTRAVENOUS at 03:07

## 2024-05-19 ASSESSMENT — PAIN - FUNCTIONAL ASSESSMENT
PAIN_FUNCTIONAL_ASSESSMENT: NONE - DENIES PAIN

## 2024-05-19 ASSESSMENT — LIFESTYLE VARIABLES
HOW MANY STANDARD DRINKS CONTAINING ALCOHOL DO YOU HAVE ON A TYPICAL DAY: PATIENT DOES NOT DRINK
HOW OFTEN DO YOU HAVE A DRINK CONTAINING ALCOHOL: MONTHLY OR LESS

## 2024-05-19 NOTE — ED NOTES
Assumed care of the patient. He is awake and alert, sitting up in the bed and working on his phone. Plan for reassessment of bleeding at 0630

## 2024-05-19 NOTE — ED NOTES
Dr. Sigala placed consult to [facility] for transfer to  oral surgery @ 0500  RE: dental hemorrhage per MD Jovon

## 2024-05-19 NOTE — ED PROVIDER NOTES
[place] of Care Note:    I assumed care of this patient at 06:00 from Dr. Sigala, please see her note for more detail. Briefly, this pt is a 63 y.o. M who had all of his teeth extracted on 5/14 who briefly held his eliquis for the procedure but resumed it the next day who presents for bleeding from the extraction sites.  Patient is hemodynamically stable with continued slow oozing.  OMF at [facility] has been consulted.  Patient is currently biting down on Surgifoam with plan to reevaluate patient in 1 hour and reconsultation of OMF if bleeding does not resolve.    Patient is reevaluated numerous times in the 1 hour and 15 minutes after signout with complete resolution of oral bleeding.  At time of discharge patient has not used her cell or any other methods to stop the bleeding for over half hour with no further bleeding.  Patient is ambulatory, tolerating p.o., and has no active bleeding at this time and therefore is asymptomatic.  Feel patient is appropriate for discharge with instructions to contact his oral surgeon for follow-up tomorrow or return to the emergency department if bleeding resumes.  The patient expresses understanding and agreement with this plan.      FINAL IMPRESSION      1. Surgical wound hemorrhage after dental procedure          DISPOSITION/PLAN   DISPOSITION Decision To Transfer 05/19/2024 04:57:34 AM       Abraham Cabrera MD  05/19/24 0716    
to display      EKG: NA     PROCEDURES   Unless otherwise noted below, none     CRITICAL CARE TIME   I personally saw the patient and independently provided 55 minutes of non-concurrent critical care out of the total shared critical care time excluding separately billable procedures.        Vitals:    Vitals:    05/19/24 0302 05/19/24 0312 05/19/24 0502 05/19/24 0525   BP: 133/79  114/69    Pulse: 80 80     Resp: 16      Temp: 97.9 °F (36.6 °C)      TempSrc: Oral      SpO2: 100%  100% 100%             Is this patient to be included in the SEP-1 Core Measure due to severe sepsis or septic shock?   No   Exclusion criteria - the patient is NOT to be included for SEP-1 Core Measure due to:  Infection is not suspected       CC/HPI Summary, DDx, ED Course, and Reassessment: Patient is a pleasant 63-year-old male, presenting with concerns for dental bleeding.  Patient had removal of all of his teeth at an outpatient clinic in [facility] last Tuesday.  Held his Eliquis prior to the procedure but has been taking it since last Wednesday.  Awoke overnight with bleeding from his gums.  Upon arrival in the ED, vitals reassuring.  Patient is resting comfortably and is in no acute distress.  Hemodynamics are stable.  Did have significant oozing from his gums and was treated with TXA soaked gauze and pressure with significant improvement in most areas however in the right posterior molar area he does have active moderate amount of bruising.  This area was injected with approximately 8 mL of 1% lidocaine with epinephrine without improvement.  Surgiflo was additionally applied without hemostasis achieved.  Patient did have episode of lightheadedness after this episode which he recovered from without difficulty.  At this point in time, given difficulty achieving hemostasis, OMFS at [facility] was consulted who agreed with management thus far and additionally recommended having the patient bite down on combat gauze.  We do

## 2024-05-19 NOTE — ED NOTES
Patient ambulatory to and from the bathroom independently. Steady on his feet. Denies dizziness or lightheadedness.    Surgical foam removed from mouth. Bleeding seems to have ceased for now. ED MD Cabrera to bedside to assess and discuss plan of care.    Patient sitting up in the bed. Maintains no needs at this time. Denies any pain

## 2024-05-19 NOTE — ED NOTES
Applied surgical foam to upper mouth at this time. Per  oral provider recommendation. Patient tolerating at this time. Call light to lap. Door open.

## 2024-05-19 NOTE — DISCHARGE INSTRUCTIONS
Please follow-up with your oral surgeon tomorrow.  If your bleeding resumes and is unable to be stopped with direct pressure please come back to the emergency department immediately.

## 2024-05-19 NOTE — ED NOTES
Patient given replacement 4x4 to bite down on. Patient following commands. Denies pain. Large clots forming.

## 2024-05-21 ENCOUNTER — TELEPHONE (OUTPATIENT)
Dept: CARDIOLOGY CLINIC | Age: 64
End: 2024-05-21

## 2024-05-21 NOTE — TELEPHONE ENCOUNTER
Pt stated that since his dental procedure he has been experiencing heavy bleeding. The procedure was on 5/14. Pt restarted his blood thinners on 5/15. The bleeding started on 5/19 and he went to [facility]. They were able to get the bleeding to stop with gauze. Bleeding was under control until in the evening on 5/20 and continues to bleed through the gauze. Pt is concerned with the amount of bleeding. He has not taken his blood thinner on 5/21. Please advise.

## 2024-05-22 ENCOUNTER — HOSPITAL ENCOUNTER (INPATIENT)
Age: 64
LOS: 1 days | Discharge: HOME | DRG: 812 | End: 2024-05-23
Attending: EMERGENCY MEDICINE | Admitting: INTERNAL MEDICINE
Payer: COMMERCIAL

## 2024-05-22 ENCOUNTER — APPOINTMENT (OUTPATIENT)
Dept: GENERAL RADIOLOGY | Age: 64
DRG: 812 | End: 2024-05-22
Payer: COMMERCIAL

## 2024-05-22 DIAGNOSIS — R42 LIGHTHEADEDNESS: Primary | ICD-10-CM

## 2024-05-22 DIAGNOSIS — D64.9 ANEMIA, UNSPECIFIED TYPE: ICD-10-CM

## 2024-05-22 PROBLEM — D62 ABLA (ACUTE BLOOD LOSS ANEMIA): Status: ACTIVE | Noted: 2024-05-22

## 2024-05-22 LAB
ABO + RH BLD: NORMAL
ALBUMIN SERPL-MCNC: 3.8 G/DL (ref 3.4–5)
ALBUMIN/GLOB SERPL: 1.5 {RATIO} (ref 1.1–2.2)
ALP SERPL-CCNC: 58 U/L (ref 40–129)
ALT SERPL-CCNC: 7 U/L (ref 10–40)
ANION GAP SERPL CALCULATED.3IONS-SCNC: 12 MMOL/L (ref 3–16)
AST SERPL-CCNC: 19 U/L (ref 15–37)
BASOPHILS # BLD: 0 K/UL (ref 0–0.2)
BASOPHILS NFR BLD: 0.4 %
BILIRUB SERPL-MCNC: 0.3 MG/DL (ref 0–1)
BLD GP AB SCN SERPL QL: NORMAL
BLOOD BANK DISPENSE STATUS: NORMAL
BLOOD BANK PRODUCT CODE: NORMAL
BPU ID: NORMAL
BUN SERPL-MCNC: 21 MG/DL (ref 7–20)
CALCIUM SERPL-MCNC: 8.9 MG/DL (ref 8.3–10.6)
CHLORIDE SERPL-SCNC: 102 MMOL/L (ref 99–110)
CO2 SERPL-SCNC: 23 MMOL/L (ref 21–32)
CREAT SERPL-MCNC: 1.4 MG/DL (ref 0.8–1.3)
DEPRECATED RDW RBC AUTO: 13.7 % (ref 12.4–15.4)
DESCRIPTION BLOOD BANK: NORMAL
EKG ATRIAL RATE: 58 BPM
EKG DIAGNOSIS: NORMAL
EKG P AXIS: 66 DEGREES
EKG P-R INTERVAL: 178 MS
EKG Q-T INTERVAL: 470 MS
EKG QRS DURATION: 96 MS
EKG QTC CALCULATION (BAZETT): 461 MS
EKG R AXIS: 3 DEGREES
EKG T AXIS: 63 DEGREES
EKG VENTRICULAR RATE: 58 BPM
EOSINOPHIL # BLD: 0 K/UL (ref 0–0.6)
EOSINOPHIL NFR BLD: 0.4 %
GFR SERPLBLD CREATININE-BSD FMLA CKD-EPI: 56 ML/MIN/{1.73_M2}
GLUCOSE SERPL-MCNC: 95 MG/DL (ref 70–99)
HCT VFR BLD AUTO: 21.1 % (ref 40.5–52.5)
HCT VFR BLD AUTO: 23 % (ref 40.5–52.5)
HCT VFR BLD AUTO: 24.5 % (ref 40.5–52.5)
HGB BLD-MCNC: 7 G/DL (ref 13.5–17.5)
HGB BLD-MCNC: 7.7 G/DL (ref 13.5–17.5)
HGB BLD-MCNC: 8.2 G/DL (ref 13.5–17.5)
LYMPHOCYTES # BLD: 1.6 K/UL (ref 1–5.1)
LYMPHOCYTES NFR BLD: 18.2 %
MAGNESIUM SERPL-MCNC: 2 MG/DL (ref 1.8–2.4)
MCH RBC QN AUTO: 30.4 PG (ref 26–34)
MCHC RBC AUTO-ENTMCNC: 33.7 G/DL (ref 31–36)
MCV RBC AUTO: 90.1 FL (ref 80–100)
MONOCYTES # BLD: 0.5 K/UL (ref 0–1.3)
MONOCYTES NFR BLD: 5.7 %
NEUTROPHILS # BLD: 6.7 K/UL (ref 1.7–7.7)
NEUTROPHILS NFR BLD: 75.3 %
NT-PROBNP SERPL-MCNC: 543 PG/ML (ref 0–124)
PLATELET # BLD AUTO: 296 K/UL (ref 135–450)
PMV BLD AUTO: 7.8 FL (ref 5–10.5)
POTASSIUM SERPL-SCNC: 4.1 MMOL/L (ref 3.5–5.1)
PROT SERPL-MCNC: 6.4 G/DL (ref 6.4–8.2)
RBC # BLD AUTO: 2.55 M/UL (ref 4.2–5.9)
SODIUM SERPL-SCNC: 137 MMOL/L (ref 136–145)
TROPONIN, HIGH SENSITIVITY: 14 NG/L (ref 0–22)
WBC # BLD AUTO: 8.9 K/UL (ref 4–11)

## 2024-05-22 PROCEDURE — 93005 ELECTROCARDIOGRAM TRACING: CPT | Performed by: PHYSICIAN ASSISTANT

## 2024-05-22 PROCEDURE — 99285 EMERGENCY DEPT VISIT HI MDM: CPT

## 2024-05-22 PROCEDURE — 86900 BLOOD TYPING SEROLOGIC ABO: CPT

## 2024-05-22 PROCEDURE — 86923 COMPATIBILITY TEST ELECTRIC: CPT

## 2024-05-22 PROCEDURE — 86850 RBC ANTIBODY SCREEN: CPT

## 2024-05-22 PROCEDURE — 2580000003 HC RX 258: Performed by: PHYSICIAN ASSISTANT

## 2024-05-22 PROCEDURE — 93010 ELECTROCARDIOGRAM REPORT: CPT | Performed by: INTERNAL MEDICINE

## 2024-05-22 PROCEDURE — 83540 ASSAY OF IRON: CPT

## 2024-05-22 PROCEDURE — 96361 HYDRATE IV INFUSION ADD-ON: CPT

## 2024-05-22 PROCEDURE — 2580000003 HC RX 258: Performed by: INTERNAL MEDICINE

## 2024-05-22 PROCEDURE — P9016 RBC LEUKOCYTES REDUCED: HCPCS

## 2024-05-22 PROCEDURE — 80053 COMPREHEN METABOLIC PANEL: CPT

## 2024-05-22 PROCEDURE — 83735 ASSAY OF MAGNESIUM: CPT

## 2024-05-22 PROCEDURE — 1200000000 HC SEMI PRIVATE

## 2024-05-22 PROCEDURE — 96360 HYDRATION IV INFUSION INIT: CPT

## 2024-05-22 PROCEDURE — 71045 X-RAY EXAM CHEST 1 VIEW: CPT

## 2024-05-22 PROCEDURE — 6370000000 HC RX 637 (ALT 250 FOR IP): Performed by: INTERNAL MEDICINE

## 2024-05-22 PROCEDURE — 85018 HEMOGLOBIN: CPT

## 2024-05-22 PROCEDURE — 85014 HEMATOCRIT: CPT

## 2024-05-22 PROCEDURE — 36415 COLL VENOUS BLD VENIPUNCTURE: CPT

## 2024-05-22 PROCEDURE — 83550 IRON BINDING TEST: CPT

## 2024-05-22 PROCEDURE — 84484 ASSAY OF TROPONIN QUANT: CPT

## 2024-05-22 PROCEDURE — 86901 BLOOD TYPING SEROLOGIC RH(D): CPT

## 2024-05-22 PROCEDURE — 36430 TRANSFUSION BLD/BLD COMPNT: CPT

## 2024-05-22 PROCEDURE — 83880 ASSAY OF NATRIURETIC PEPTIDE: CPT

## 2024-05-22 PROCEDURE — 85025 COMPLETE CBC W/AUTO DIFF WBC: CPT

## 2024-05-22 PROCEDURE — 30233N1 TRANSFUSION OF NONAUTOLOGOUS RED BLOOD CELLS INTO PERIPHERAL VEIN, PERCUTANEOUS APPROACH: ICD-10-PCS | Performed by: INTERNAL MEDICINE

## 2024-05-22 RX ORDER — SODIUM CHLORIDE 9 MG/ML
INJECTION, SOLUTION INTRAVENOUS CONTINUOUS
Status: DISCONTINUED | OUTPATIENT
Start: 2024-05-22 | End: 2024-05-23 | Stop reason: HOSPADM

## 2024-05-22 RX ORDER — VALSARTAN 80 MG/1
80 TABLET ORAL DAILY
Status: DISCONTINUED | OUTPATIENT
Start: 2024-05-22 | End: 2024-05-23 | Stop reason: HOSPADM

## 2024-05-22 RX ORDER — SODIUM CHLORIDE 9 MG/ML
INJECTION, SOLUTION INTRAVENOUS PRN
Status: DISCONTINUED | OUTPATIENT
Start: 2024-05-22 | End: 2024-05-23 | Stop reason: HOSPADM

## 2024-05-22 RX ORDER — SODIUM CHLORIDE 0.9 % (FLUSH) 0.9 %
5-40 SYRINGE (ML) INJECTION EVERY 12 HOURS SCHEDULED
Status: DISCONTINUED | OUTPATIENT
Start: 2024-05-22 | End: 2024-05-23 | Stop reason: HOSPADM

## 2024-05-22 RX ORDER — ACETAMINOPHEN 325 MG/1
650 TABLET ORAL EVERY 6 HOURS PRN
Status: DISCONTINUED | OUTPATIENT
Start: 2024-05-22 | End: 2024-05-23 | Stop reason: HOSPADM

## 2024-05-22 RX ORDER — METOPROLOL SUCCINATE 50 MG/1
50 TABLET, EXTENDED RELEASE ORAL DAILY
Status: DISCONTINUED | OUTPATIENT
Start: 2024-05-22 | End: 2024-05-23 | Stop reason: HOSPADM

## 2024-05-22 RX ORDER — PROCHLORPERAZINE EDISYLATE 5 MG/ML
10 INJECTION INTRAMUSCULAR; INTRAVENOUS EVERY 6 HOURS PRN
Status: DISCONTINUED | OUTPATIENT
Start: 2024-05-22 | End: 2024-05-23 | Stop reason: HOSPADM

## 2024-05-22 RX ORDER — ATORVASTATIN CALCIUM 40 MG/1
40 TABLET, FILM COATED ORAL NIGHTLY
Status: DISCONTINUED | OUTPATIENT
Start: 2024-05-22 | End: 2024-05-23 | Stop reason: HOSPADM

## 2024-05-22 RX ORDER — 0.9 % SODIUM CHLORIDE 0.9 %
1000 INTRAVENOUS SOLUTION INTRAVENOUS ONCE
Status: COMPLETED | OUTPATIENT
Start: 2024-05-22 | End: 2024-05-22

## 2024-05-22 RX ORDER — ACETAMINOPHEN 650 MG/1
650 SUPPOSITORY RECTAL EVERY 6 HOURS PRN
Status: DISCONTINUED | OUTPATIENT
Start: 2024-05-22 | End: 2024-05-23 | Stop reason: HOSPADM

## 2024-05-22 RX ORDER — CLOPIDOGREL BISULFATE 75 MG/1
75 TABLET ORAL DAILY
Status: DISCONTINUED | OUTPATIENT
Start: 2024-05-22 | End: 2024-05-23 | Stop reason: HOSPADM

## 2024-05-22 RX ORDER — SODIUM CHLORIDE 0.9 % (FLUSH) 0.9 %
5-40 SYRINGE (ML) INJECTION PRN
Status: DISCONTINUED | OUTPATIENT
Start: 2024-05-22 | End: 2024-05-23 | Stop reason: HOSPADM

## 2024-05-22 RX ADMIN — SODIUM CHLORIDE: 9 INJECTION, SOLUTION INTRAVENOUS at 21:09

## 2024-05-22 RX ADMIN — ATORVASTATIN CALCIUM 40 MG: 40 TABLET, FILM COATED ORAL at 21:06

## 2024-05-22 RX ADMIN — SODIUM CHLORIDE 1000 ML: 9 INJECTION, SOLUTION INTRAVENOUS at 10:39

## 2024-05-22 RX ADMIN — Medication 10 ML: at 21:06

## 2024-05-22 ASSESSMENT — ENCOUNTER SYMPTOMS
COUGH: 0
CONSTIPATION: 0
BACK PAIN: 0
EYE PAIN: 0
VOMITING: 0
ABDOMINAL PAIN: 0
SORE THROAT: 0
NAUSEA: 0
SHORTNESS OF BREATH: 0
RHINORRHEA: 0
DIARRHEA: 0

## 2024-05-22 ASSESSMENT — PAIN - FUNCTIONAL ASSESSMENT: PAIN_FUNCTIONAL_ASSESSMENT: 0-10

## 2024-05-22 ASSESSMENT — LIFESTYLE VARIABLES
HOW MANY STANDARD DRINKS CONTAINING ALCOHOL DO YOU HAVE ON A TYPICAL DAY: PATIENT DOES NOT DRINK
HOW MANY STANDARD DRINKS CONTAINING ALCOHOL DO YOU HAVE ON A TYPICAL DAY: PATIENT DOES NOT DRINK
HOW OFTEN DO YOU HAVE A DRINK CONTAINING ALCOHOL: NEVER
HOW OFTEN DO YOU HAVE A DRINK CONTAINING ALCOHOL: NEVER

## 2024-05-22 ASSESSMENT — PAIN SCALES - GENERAL: PAINLEVEL_OUTOF10: 0

## 2024-05-22 NOTE — TELEPHONE ENCOUNTER
Spoke with pt and relayed message, pt v/u. Pt said he is having some issues with feeling light headed and blood pressure was 112/46 this morning. Advised pt to go to the ED.

## 2024-05-22 NOTE — ED NOTES
Patient called out for bathroom.  Patient refusing urinal at this time; requesting to move around.  Patient ambulated to bathroom with nurse on standby and returned to bed safely.

## 2024-05-22 NOTE — ED PROVIDER NOTES
[facility] A[facility] REMOTE TELEMETRY  EMERGENCY DEPARTMENT ENCOUNTER        Pt Name: Abraham Mckinnon  MRN: [id number]  Birthdate 1960  Date of evaluation: 5/22/2024  Provider: KATELYN Fatima  PCP: Tristan Lima MD  Note Started: 10:29 AM EDT 5/22/24       I have seen and evaluated this patient with my supervising physician Tomasz Muñoz MD.      CHIEF COMPLAINT       Chief Complaint   Patient presents with    Dizziness     Patient was at work this morning and felt lightheaded.  Sunday in ED for uncontrolled gum bleeding after oral surgery, was taking blood thinners. Monday, bleeding began again was able to control it at home. Dr. Junior stopped blood thinners on Monday.  Followed up with dentist Tuesday.       HISTORY OF PRESENT ILLNESS: 1 or more Elements     History from : Patient    Limitations to history : None    Abraham Mckinnon is a 63 y.o. male who presents to the emergency room due to lightheadedness sensation started today while he was at work.  Patient states that he went to go lift some objects that he typically does not work and he felt lightheaded.  He stated he had to sit down and the symptoms resolved.  He states that he did check his blood pressure this morning when he woke up and noted his blood pressure was 112 systolic.  States normally his blood pressure is around 140 systolic.  States that he only drank some coffee this morning but did not eat.  He states he was here this past weekend after having a dental procedure done due to excessive bleeding within the gums.  He states the bleeding was controlled here in the emergency room and he was discharged back home.  On Monday he noticed some more bleeding and he was able to stop it at his house.  States he followed up with his dentist and states that everything looks fine.  During this timeframe he has been off his Eliquis due to this bleeding and has been on.  His cardiologist told him to stop the blood thinner on Monday until the 
R/D/G  Neuro: no focal neuro deficits with strength and sensation 5/5 in all 4 extremities, GCS equals 15, no pronator drift      The Ekg interpreted by me shows  sinus bradycardia, rate=58    Axis is   Normal  QTc is   prolonged QT  Intervals and Durations are unremarkable.      ST Segments: no acute change and nonspecific changes  No significant change from prior EKG dated - 4/3/24  No STEMI         I independently interpreted the following study(s) labs which show worsening hemoglobin from 13.4 down to 7.7 concerning for significant blood loss from bleeding from the mouth.  Troponin was negative and story not concerning for acute coronary syndrome.                I was the primary provider for the patient along with KATELYN Fernandez.    MDM: Patient was evaluated due to significant lightheadedness with exertion today although currently while lying in the bed denying any complaints.  Hemoglobin down trended significantly compared to baseline at this time he will need to be admitted for serial hemoglobin checks in case needs blood transfusion.  He was otherwise in no acute distress when I saw him and vitals are stable.  He agreed with the plan for admission.          Comment: Please note this report has been produced using speech recognition software and may contain errors related to that system including errors in grammar, punctuation, and spelling, as well as words and phrases that may be inappropriate. If there are any questions or concerns please feel free to contact the dictating provider for clarification.                   Tomasz Muñoz MD  05/23/24 8865

## 2024-05-22 NOTE — CONSENT
Informed Consent for Blood Component Transfusion Note    I will have discussed with the patient the rationale for blood component transfusion; its benefits in treating or preventing fatigue, organ damage, or death; and its risk which includes mild transfusion reactions, rare risk of blood borne infection, or more serious but rare reactions. I will have discussed the alternatives to transfusion, including the risk and consequences of not receiving transfusion. The patient will hve had an opportunity to ask questions and had agreed to proceed with transfusion of blood components.    Electronically signed by MARCELLA BENTLEY MD on 5/22/24 at 12:49 PM EDT

## 2024-05-22 NOTE — H&P
Vitals:   Vitals:    24 1004 24 1227   BP: 134/74 136/73   Pulse: 67 61   Resp: 19 15   Temp: 98.4 °F (36.9 °C)    TempSrc: Oral    SpO2: 100% 100%   Weight: 89.8 kg (198 lb)    Height: 1.905 m (6' 3\")        Medications Prior to Admission     Prior to Admission medications    Medication Sig Start Date End Date Taking? Authorizing Provider   sildenafil (VIAGRA) 50 MG tablet Take 1 tablet by mouth daily as needed for Erectile Dysfunction 24   Tristan Lima MD   metoprolol succinate (TOPROL XL) 50 MG extended release tablet TAKE ONE TABLET BY MOUTH TWICE A DAY  Patient taking differently: Take 1 tablet by mouth daily 3/21/24   Tianna Zhang MD   atorvastatin (LIPITOR) 40 MG tablet TAKE 1 TABLET BY MOUTH NIGHTLY 23   Aneesh De La Cruz MD   clopidogrel (PLAVIX) 75 MG tablet TAKE ONE TABLET BY MOUTH ONCE A DAY  Patient not taking: Reported on 23   Aneesh De La Cruz MD   valsartan (DIOVAN) 80 MG tablet Take 1 tablet by mouth daily 23   Se Junior MD   apixaban (ELIQUIS) 5 MG TABS tablet Take 1 tablet by mouth 2 times daily  Patient not taking: Reported on 2024   Se Junior MD       Past Medical History:   PMHx   Past Medical History:   Diagnosis Date    CAD (coronary artery disease)     CHF (congestive heart failure) ([facility])     Hypertension      PSHX:  has a past surgical history that includes Coronary angioplasty with stent (03/10/2023) and Dental surgery (Bilateral).  Allergies: No Known Allergies  Fam HX: family history includes Heart Disease in his sister; Heart Failure in his father.  Soc HX:   Social History     Socioeconomic History    Marital status: Single     Spouse name: None    Number of children: None    Years of education: None    Highest education level: None   Tobacco Use    Smoking status: Former     Current packs/day: 0.00     Types: Cigarettes     Quit date: 3/3/2023     Years since quittin.2    Smokeless tobacco: Never

## 2024-05-23 VITALS
BODY MASS INDEX: 24.62 KG/M2 | RESPIRATION RATE: 16 BRPM | OXYGEN SATURATION: 100 % | SYSTOLIC BLOOD PRESSURE: 129 MMHG | DIASTOLIC BLOOD PRESSURE: 74 MMHG | WEIGHT: 198 LBS | HEIGHT: 75 IN | HEART RATE: 50 BPM | TEMPERATURE: 98.2 F

## 2024-05-23 LAB
ANION GAP SERPL CALCULATED.3IONS-SCNC: 7 MMOL/L (ref 3–16)
BUN SERPL-MCNC: 12 MG/DL (ref 7–20)
CALCIUM SERPL-MCNC: 8.7 MG/DL (ref 8.3–10.6)
CHLORIDE SERPL-SCNC: 109 MMOL/L (ref 99–110)
CO2 SERPL-SCNC: 24 MMOL/L (ref 21–32)
CREAT SERPL-MCNC: 0.9 MG/DL (ref 0.8–1.3)
DEPRECATED RDW RBC AUTO: 14.2 % (ref 12.4–15.4)
GFR SERPLBLD CREATININE-BSD FMLA CKD-EPI: >90 ML/MIN/{1.73_M2}
GLUCOSE SERPL-MCNC: 93 MG/DL (ref 70–99)
HCT VFR BLD AUTO: 23.9 % (ref 40.5–52.5)
HGB BLD-MCNC: 8 G/DL (ref 13.5–17.5)
IRON SATN MFR SERPL: 17 % (ref 20–50)
IRON SERPL-MCNC: 40 UG/DL (ref 59–158)
MCH RBC QN AUTO: 30.5 PG (ref 26–34)
MCHC RBC AUTO-ENTMCNC: 33.6 G/DL (ref 31–36)
MCV RBC AUTO: 90.8 FL (ref 80–100)
PLATELET # BLD AUTO: 270 K/UL (ref 135–450)
PMV BLD AUTO: 7.6 FL (ref 5–10.5)
POTASSIUM SERPL-SCNC: 4.2 MMOL/L (ref 3.5–5.1)
RBC # BLD AUTO: 2.63 M/UL (ref 4.2–5.9)
SODIUM SERPL-SCNC: 140 MMOL/L (ref 136–145)
TIBC SERPL-MCNC: 241 UG/DL (ref 260–445)
WBC # BLD AUTO: 6.8 K/UL (ref 4–11)

## 2024-05-23 PROCEDURE — 80048 BASIC METABOLIC PNL TOTAL CA: CPT

## 2024-05-23 PROCEDURE — 85027 COMPLETE CBC AUTOMATED: CPT

## 2024-05-23 PROCEDURE — 36415 COLL VENOUS BLD VENIPUNCTURE: CPT

## 2024-05-23 NOTE — PROGRESS NOTES
Pt Aox4 VSS pt stated no pain or SOB. Pt received a unit of blood overnight. Hemoglobin increased. Call light in reach.

## 2024-05-23 NOTE — PLAN OF CARE
Problem: Discharge Planning  Goal: Discharge to home or other facility with appropriate resources  5/23/2024 1042 by Bonnie Hassan, RN  Outcome: Progressing  5/23/2024 0638 by Nu Ellison, RN  Outcome: Progressing     Problem: Pain  Goal: Verbalizes/displays adequate comfort level or baseline comfort level  5/23/2024 1042 by Bonnie Hassan, RN  Outcome: Progressing  5/23/2024 0638 by Nu Ellison, RN  Outcome: Progressing     Problem: Hematologic - Adult  Goal: Maintains hematologic stability  5/23/2024 0638 by Nu Ellison, RN  Outcome: Progressing

## 2024-05-23 NOTE — PROGRESS NOTES
Patient discharged. AVS reviewed with patient. Patient declined wheelchair at discharge. IV removed.   glasses

## 2024-05-23 NOTE — CARE COORDINATION
Chart reviewed and discussion with MD. WHITTEN, has PCP and payor source. Patient discharging home with no needs per Dr Dutton.      Tammie Kasper RN

## 2024-05-23 NOTE — PLAN OF CARE
Problem: Discharge Planning  Goal: Discharge to home or other facility with appropriate resources  5/23/2024 1042 by Bonnie Hassan RN  Outcome: Completed  5/23/2024 1042 by Bonnie Hassan RN  Outcome: Progressing  5/23/2024 0638 by Nu Ellison RN  Outcome: Progressing     Problem: Pain  Goal: Verbalizes/displays adequate comfort level or baseline comfort level  5/23/2024 1042 by Bonnie Hassan RN  Outcome: Completed  5/23/2024 1042 by Bonnie Hassan RN  Outcome: Progressing  5/23/2024 0638 by Nu Ellison, RN  Outcome: Progressing     Problem: Hematologic - Adult  Goal: Maintains hematologic stability  5/23/2024 1042 by Bonnie Hassan RN  Outcome: Completed  5/23/2024 0638 by Nu Ellison, RN  Outcome: Progressing

## 2024-05-23 NOTE — DISCHARGE SUMMARY
Discharge Summary    Name:  Abraham Mckinnon /Age/Sex: 1960 (63 y.o. male)   Admit Date: 2024  Discharge Date: 24   MRN & CSN:  [id number] & [id number] Encounter Date and Time 24 8:53 AM EDT    Attending:  Marcella Dutton MD Discharging Provider: MARCELLA DUTTON MD       Hospital Course:       The patient is a pleasant, stoic 63 Y M with a h/o former smoking, some degree of former \"alcohol use,\" HTN, CAD s/p stenting in 3/2023, CHF with recovered EF, and PAF s/p ablation. He had all of his teeth extracted on , with plans for eventual dental implants in the future. He held his clopidogrel and apixaban for 5 days prior to the surgery, then resumed the meds afterward. He developed lots of gingival bleeding. He felt like he was swallowing lots of blood, enough to make him nauseated and turn his stool black. He saw his PCP on  and his BP was 97/63 (three other values within the last six months ranged from 130-144 / 66-86). The bleeding persisted for 5 days so he came to the ED on . They were able to stop the bleeding and discharged him home. The patient tried to restart his apixaban and clopidogrel, but intermittent bleeding kept recurring. Dr. Junior of cardiology told him on  to stop these meds until the bleeding has finally stopped. His last doses were on  AM. The patient says the bleeding has nearly stopped at this point, but he came to the ED today because of presyncopal symptoms. He was getting very lightheaded at work when he was lifting things. Here in the ED his Hb was found to by 7 (baseline was 13.7 as of 7 months ago). BP was low-normal and orthostats were positive. He had a mild BARBIE. Hospital medicine was called for admission.       ABLA.  1u pRBCs.  No further bleeding, Hb stable.      BARBIE.  Good response to blood and IVFs.  Baseline Cr is normal.     CAD.  No chest pain.  Resume clopidogrel .  Statin.  Resumed metoprolol.      Chronic diastolic CHF, HTN.

## 2024-06-07 ENCOUNTER — OFFICE VISIT (OUTPATIENT)
Dept: CARDIOLOGY CLINIC | Age: 64
End: 2024-06-07
Payer: COMMERCIAL

## 2024-06-07 VITALS
WEIGHT: 202.5 LBS | SYSTOLIC BLOOD PRESSURE: 132 MMHG | HEART RATE: 59 BPM | DIASTOLIC BLOOD PRESSURE: 70 MMHG | BODY MASS INDEX: 25.18 KG/M2 | OXYGEN SATURATION: 98 % | HEIGHT: 75 IN

## 2024-06-07 DIAGNOSIS — I48.91 ATRIAL FIBRILLATION WITH RAPID VENTRICULAR RESPONSE (HCC): Primary | ICD-10-CM

## 2024-06-07 DIAGNOSIS — I42.9 CARDIOMYOPATHY, UNSPECIFIED TYPE (HCC): ICD-10-CM

## 2024-06-07 DIAGNOSIS — I25.10 CORONARY ARTERY DISEASE INVOLVING NATIVE CORONARY ARTERY OF NATIVE HEART WITHOUT ANGINA PECTORIS: ICD-10-CM

## 2024-06-07 DIAGNOSIS — I25.5 ISCHEMIC CARDIOMYOPATHY: ICD-10-CM

## 2024-06-07 DIAGNOSIS — I25.110 CORONARY ARTERY DISEASE INVOLVING NATIVE CORONARY ARTERY OF NATIVE HEART WITH UNSTABLE ANGINA PECTORIS (HCC): ICD-10-CM

## 2024-06-07 PROCEDURE — 3078F DIAST BP <80 MM HG: CPT | Performed by: INTERNAL MEDICINE

## 2024-06-07 PROCEDURE — 99215 OFFICE O/P EST HI 40 MIN: CPT | Performed by: INTERNAL MEDICINE

## 2024-06-07 PROCEDURE — 3075F SYST BP GE 130 - 139MM HG: CPT | Performed by: INTERNAL MEDICINE

## 2024-06-07 NOTE — PATIENT INSTRUCTIONS
PLAN:  Stop plavix. Therapy complete  Stop valsartan  Start entresto 24-26 mg twice daily   Continue all other cardiac medications eliquis 5 bid, lipitor 40, toprol xl 50 bid  Follow up with me in 1 year

## 2024-06-07 NOTE — PROGRESS NOTES
[facility] - Daily Progress/Follow-up Note        REASON FOR FOLLOW UP  CAD, ischemic cardiomyopathy      INTERVAL HISTORY  Mr. Mckinnon is a 63 y.o. male presenting for hospital follow up. He has a medical history of AFib with RVR, hypertension, CAD s/p complex PCI, pulmonary hypertension, hyperlipidemia, severe ischemic cardiomyopathy.     He was admitted 3/3/2023-3/14/0223 for shortness of breath, fatigue. He was found to be in Afib with RVR. Echo 3/3/2023 EF 15-20%, moderate bi-atrial enlargement, mild TR, mild to moderate MR. 3/6/2023 SUSI with cardioversion performed for Afib.  Coronary angiogram 3/7/2023 showed severe two vessel CAD suggesting ischemic cardiomyopathy, severe elevated left sided filling pressures suggestive of volume overload. On 3/10/2023 Successful external electrical cardioversion. On 3/10/2023 coronary angiogram via right femoral approach impella supported performed PCI to mid to distal LAD with LOLA, PCI diagonal 2 with POBA, Distal aortic and iliofemoral arterial angiogram, PTA to proximal right common iliac artery, Impella removed and patient extubated next day.  Overall successful procedure without any complications.  He was discharged on guideline directed medical therapy for CAD, cardiomyopathy, A-fib.     OV, 3/23/2023,  he presented for first cardiology outpatient visit with increased weakness and fatigue as well as lightheadedness.  Was found to be hypotensive.  He works at a [employer] market and the first day went back to work he was feeling extremely tired and lightheaded so could not continue work. He was noted to be hypotensive with systolic blood pressure in the 80's. From the office, he was sent to emergency department due to concern for cardiac medication related hypotension as well as suspected volume depletion and dehydration.     Admitted 3/23/2023-3/24/2023 from clinic for weakness and shortness of breath. Following admission his home metoprolol XL dosing was

## 2024-07-30 ASSESSMENT — ENCOUNTER SYMPTOMS
SHORTNESS OF BREATH: 0
STRIDOR: 0
LEFT EYE: 0
WHEEZING: 0
RIGHT EYE: 0
HEMATOCHEZIA: 0
HEMATEMESIS: 0

## 2024-07-30 NOTE — PROGRESS NOTES
BY MOUTH TWICE A DAY (Patient taking differently: Take 1 tablet by mouth daily) 180 tablet 1    atorvastatin (LIPITOR) 40 MG tablet TAKE 1 TABLET BY MOUTH NIGHTLY 90 tablet 2     No current facility-administered medications for this visit.     Lab Review     Lab Results   Component Value Date/Time     05/23/2024 05:52 AM    K 4.2 05/23/2024 05:52 AM     05/23/2024 05:52 AM    CO2 24 05/23/2024 05:52 AM    BUN 12 05/23/2024 05:52 AM    CREATININE 0.9 05/23/2024 05:52 AM    GLUCOSE 93 05/23/2024 05:52 AM    CALCIUM 8.7 05/23/2024 05:52 AM      Lab Results   Component Value Date    WBC 6.8 05/23/2024    HGB 8.0 (L) 05/23/2024    HCT 23.9 (L) 05/23/2024    MCV 90.8 05/23/2024     05/23/2024     Lab Results   Component Value Date    TSH 1.41 03/03/2023    TSHFT4 0.83 10/16/2023     No results found for: \"BNP\"    .I, Eduardo Mejia RN am scribing for and in the presence of Dr. Tianna Zhang. 07/31/24 11:03 AM.

## 2024-07-31 ENCOUNTER — OFFICE VISIT (OUTPATIENT)
Dept: CARDIOLOGY CLINIC | Age: 64
End: 2024-07-31
Payer: COMMERCIAL

## 2024-07-31 VITALS
DIASTOLIC BLOOD PRESSURE: 80 MMHG | SYSTOLIC BLOOD PRESSURE: 132 MMHG | WEIGHT: 195.2 LBS | OXYGEN SATURATION: 98 % | HEART RATE: 57 BPM | HEIGHT: 75 IN | BODY MASS INDEX: 24.27 KG/M2

## 2024-07-31 DIAGNOSIS — I25.5 ISCHEMIC CARDIOMYOPATHY: ICD-10-CM

## 2024-07-31 DIAGNOSIS — I10 ESSENTIAL HYPERTENSION: ICD-10-CM

## 2024-07-31 DIAGNOSIS — I48.20 CHRONIC ATRIAL FIBRILLATION (HCC): ICD-10-CM

## 2024-07-31 DIAGNOSIS — I48.91 ATRIAL FIBRILLATION WITH RAPID VENTRICULAR RESPONSE (HCC): Primary | ICD-10-CM

## 2024-07-31 DIAGNOSIS — I48.19 PERSISTENT ATRIAL FIBRILLATION (HCC): ICD-10-CM

## 2024-07-31 DIAGNOSIS — I25.10 CORONARY ARTERY DISEASE INVOLVING NATIVE CORONARY ARTERY OF NATIVE HEART WITHOUT ANGINA PECTORIS: ICD-10-CM

## 2024-07-31 DIAGNOSIS — I48.91 ATRIAL FIBRILLATION WITH RVR (HCC): ICD-10-CM

## 2024-07-31 PROCEDURE — 93000 ELECTROCARDIOGRAM COMPLETE: CPT | Performed by: INTERNAL MEDICINE

## 2024-07-31 PROCEDURE — 3079F DIAST BP 80-89 MM HG: CPT | Performed by: INTERNAL MEDICINE

## 2024-07-31 PROCEDURE — 99214 OFFICE O/P EST MOD 30 MIN: CPT | Performed by: INTERNAL MEDICINE

## 2024-07-31 PROCEDURE — 3075F SYST BP GE 130 - 139MM HG: CPT | Performed by: INTERNAL MEDICINE

## 2024-07-31 NOTE — PATIENT INSTRUCTIONS
Plan:     Patient is taking all cardiac medications as prescribed and tolerates them well.   Follow up with me in 6 months.

## 2024-08-15 ENCOUNTER — OFFICE VISIT (OUTPATIENT)
Dept: INTERNAL MEDICINE CLINIC | Age: 64
End: 2024-08-15

## 2024-08-15 VITALS
HEIGHT: 75 IN | DIASTOLIC BLOOD PRESSURE: 84 MMHG | OXYGEN SATURATION: 99 % | BODY MASS INDEX: 24.12 KG/M2 | SYSTOLIC BLOOD PRESSURE: 136 MMHG | HEART RATE: 58 BPM | WEIGHT: 194 LBS

## 2024-08-15 DIAGNOSIS — Z11.59 NEED FOR HEPATITIS C SCREENING TEST: ICD-10-CM

## 2024-08-15 DIAGNOSIS — N52.01 ERECTILE DYSFUNCTION DUE TO ARTERIAL INSUFFICIENCY: ICD-10-CM

## 2024-08-15 DIAGNOSIS — Z11.4 SCREENING FOR HIV WITHOUT PRESENCE OF RISK FACTORS: Primary | ICD-10-CM

## 2024-08-15 DIAGNOSIS — I25.83 CORONARY ARTERY DISEASE DUE TO LIPID RICH PLAQUE: ICD-10-CM

## 2024-08-15 DIAGNOSIS — I25.110 CORONARY ARTERY DISEASE INVOLVING NATIVE CORONARY ARTERY OF NATIVE HEART WITH UNSTABLE ANGINA PECTORIS (HCC): ICD-10-CM

## 2024-08-15 DIAGNOSIS — I25.10 CORONARY ARTERY DISEASE DUE TO LIPID RICH PLAQUE: ICD-10-CM

## 2024-08-15 DIAGNOSIS — E78.5 DYSLIPIDEMIA: ICD-10-CM

## 2024-08-15 DIAGNOSIS — I48.0 PAROXYSMAL ATRIAL FIBRILLATION (HCC): ICD-10-CM

## 2024-08-15 DIAGNOSIS — Z12.11 SCREENING FOR COLON CANCER: ICD-10-CM

## 2024-08-15 PROCEDURE — 3075F SYST BP GE 130 - 139MM HG: CPT | Performed by: INTERNAL MEDICINE

## 2024-08-15 PROCEDURE — 99214 OFFICE O/P EST MOD 30 MIN: CPT | Performed by: INTERNAL MEDICINE

## 2024-08-15 PROCEDURE — 3079F DIAST BP 80-89 MM HG: CPT | Performed by: INTERNAL MEDICINE

## 2024-08-15 RX ORDER — ATORVASTATIN CALCIUM 40 MG/1
40 TABLET, FILM COATED ORAL NIGHTLY
Qty: 90 TABLET | Refills: 2 | Status: SHIPPED | OUTPATIENT
Start: 2024-08-15

## 2024-08-15 RX ORDER — METOPROLOL SUCCINATE 50 MG/1
50 TABLET, EXTENDED RELEASE ORAL DAILY
Qty: 90 TABLET | Refills: 2 | Status: SHIPPED | OUTPATIENT
Start: 2024-08-15

## 2024-08-15 NOTE — PROGRESS NOTES
SUBJECTIVE:  Follow up from 5/16 for atrial fibrillation on apixaban.  Seen by Dr. Zhang (7/31) with ECG showing sinus rhythm, HR 57, and normal AV conduction.  Started on Entresta (stopped valsartan and clopidogrel).      History of present illness:    Hospitalized (Sept 2023) s/p atrial fibrillation ablation (9/5/23, Dr Zhang) at [facility].  He has been going to cardiac rehab three times per week and feels \"great\".   amiodarone discontinued by Dr. Zhang after ablation (Sept 2023).  No atrial fibrillation on 30 day event monitor.   Will be seen by Dr. Junior in June.  Recent dental surgery with implants.  He tried Flonase but stopped by nosebleeds.  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      Hospitalized (Mar 2023) with atrial fibrillation with rapid ventricular response s/p SUSI and cardioversion (3/6). Echo with EF of 20%.  Left heart catheterization showed severe multivessel CAD and ischemic cardiomyopathy.  LAD and diagonal 2 branch treated with PCI and Impella (3/10 - 3/11).   Taking clopidogrel 75 mg daily but not aspirin.  He was hospitalized (3/23 - 3/24) with orthostatic hypotension and atrial fibrillation with RVR, adjusted dose of amiodarone to 200 mg once per day (down from BID).  Entresto and spironolactone held.  Stopped smoking Mar 3 after 40 pk/yr history of smoking.        MEDICATIONS:  apixaban (ELIQUIS) 5 MG TABS tablet Take 1 tablet by mouth 2 times daily   sacubitril-valsartan (ENTRESTO) 24-26 MG per tablet Take 1 tablet by mouth 2 times daily   sildenafil (VIAGRA) 50 MG tablet Take 1 tablet by mouth daily as needed for Erectile Dysfunction   metoprolol succinate (TOPROL XL) 50 MG extended release tablet TAKE ONE TABLET BY MOUTH TWICE A DAY (Patient taking differently: Take 1 tablet by mouth daily)   atorvastatin (LIPITOR) 40 MG tablet TAKE 1 TABLET BY MOUTH NIGHTLY         Lab Results   Component Value Date    LABA1C 5.4 03/03/2023     Lab Results   Component Value

## 2024-08-15 NOTE — PATIENT INSTRUCTIONS
Paroxysmal atrial fibrillation after ablation:  Continue taking apixaban (Eliquis) 5 mg TWICE per day for anticoagulation and metoprolol succinate (Toprol XL) 50 mg ONCE per day in the morning.    Coronary artery disease with ischemic cardiomyopathy:  Ejection fracture has improved from 20% (March 2023) 50-55% (Sept 2023).  Continue taking clopidogrel (Plavix) 75 mg ONCE per day and valsartan (Diovan) 40 mg (half tablet) ONCE per day.  Since EF has increased above 35% will not need AICD.    Dyslipidemia (LDL 46, goal less than 70):  Continue taking atorvastatin (Lipitor) 40 mg every evening.     Anemia (hemoglobin 8.0 gm/dL) with mild iron deficiency (iron saturation 17%).  Colonoscopy in January 2024 with one benign polyp, repeat in five years.  START taking ferrous sulfate 325 mg tablet daily with food.   Erectile dysfunction:  Try taking sildenafil (Viagra) 50 mg tablet about 30 minutes prior to anticipated intercourse.  Avoid ANY nitrates or nitroglycerin within 48 hours of taking sildenafil.   History of smoking (stopped March 2023):  Normal spirometry (Apr 2023).      Follow-up in three months

## 2024-10-16 ENCOUNTER — TELEPHONE (OUTPATIENT)
Dept: CARDIOLOGY CLINIC | Age: 64
End: 2024-10-16

## 2024-10-16 NOTE — TELEPHONE ENCOUNTER
CARDIAC CLEARANCE REQUEST    What type of procedure are you having:  Minor implant surgery  Are you taking any blood thinners:  Eliquis and Plavis  Type on anesthesia:  Didn't say  When is your procedure scheduled for:  10.16.2024  What physician is performing your procedure:  Dr Ugo Sheriff  Phone Number:  [phone]  Fax number to send the letter:   [phone]    Last ov: 6.7.2024 FXW  Next ov: follow up in one year

## 2024-10-18 NOTE — TELEPHONE ENCOUNTER
Cardiac Clearance created. Attempted to fax but fax said busy twice. Attempted to call Dr Ugo Sheriff's office at [phone]. OV   Will attempt to fax 10/21

## 2024-10-18 NOTE — TELEPHONE ENCOUNTER
Patient is intermediate risk from cardiac standpoint.  No further cardiac investigation or intervention is needed before planned surgery.  Okay to hold Eliquis and Plavix for the duration of surgery but would take low-dose aspirin in the interim while other medications are on hold.

## 2024-11-14 ENCOUNTER — HOSPITAL ENCOUNTER (OUTPATIENT)
Age: 64
Discharge: HOME | End: 2024-11-14
Payer: COMMERCIAL

## 2024-11-14 ENCOUNTER — OFFICE VISIT (OUTPATIENT)
Dept: INTERNAL MEDICINE CLINIC | Age: 64
End: 2024-11-14

## 2024-11-14 VITALS
OXYGEN SATURATION: 99 % | DIASTOLIC BLOOD PRESSURE: 73 MMHG | SYSTOLIC BLOOD PRESSURE: 119 MMHG | HEIGHT: 75 IN | HEART RATE: 60 BPM | BODY MASS INDEX: 23.87 KG/M2 | WEIGHT: 192 LBS

## 2024-11-14 DIAGNOSIS — D50.0 IRON DEFICIENCY ANEMIA DUE TO CHRONIC BLOOD LOSS: Primary | ICD-10-CM

## 2024-11-14 DIAGNOSIS — I25.110 CORONARY ARTERY DISEASE INVOLVING NATIVE CORONARY ARTERY OF NATIVE HEART WITH UNSTABLE ANGINA PECTORIS (HCC): ICD-10-CM

## 2024-11-14 DIAGNOSIS — D50.0 IRON DEFICIENCY ANEMIA DUE TO CHRONIC BLOOD LOSS: ICD-10-CM

## 2024-11-14 DIAGNOSIS — I48.0 PAROXYSMAL ATRIAL FIBRILLATION (HCC): ICD-10-CM

## 2024-11-14 DIAGNOSIS — N52.01 ERECTILE DYSFUNCTION DUE TO ARTERIAL INSUFFICIENCY: ICD-10-CM

## 2024-11-14 DIAGNOSIS — E78.5 DYSLIPIDEMIA: ICD-10-CM

## 2024-11-14 LAB
ALBUMIN SERPL-MCNC: 4.3 G/DL (ref 3.4–5)
ANION GAP SERPL CALCULATED.3IONS-SCNC: 9 MMOL/L (ref 3–16)
BASOPHILS # BLD: 0.1 K/UL (ref 0–0.2)
BASOPHILS NFR BLD: 1.1 %
BUN SERPL-MCNC: 16 MG/DL (ref 7–20)
CALCIUM SERPL-MCNC: 9.6 MG/DL (ref 8.3–10.6)
CHLORIDE SERPL-SCNC: 103 MMOL/L (ref 99–110)
CHOLEST SERPL-MCNC: 144 MG/DL (ref 0–199)
CO2 SERPL-SCNC: 26 MMOL/L (ref 21–32)
CREAT SERPL-MCNC: 0.9 MG/DL (ref 0.8–1.3)
DEPRECATED RDW RBC AUTO: 16 % (ref 12.4–15.4)
EOSINOPHIL # BLD: 0.2 K/UL (ref 0–0.6)
EOSINOPHIL NFR BLD: 2.4 %
GFR SERPLBLD CREATININE-BSD FMLA CKD-EPI: >90 ML/MIN/{1.73_M2}
GLUCOSE SERPL-MCNC: 61 MG/DL (ref 70–99)
HCT VFR BLD AUTO: 40.4 % (ref 40.5–52.5)
HDLC SERPL-MCNC: 44 MG/DL (ref 40–60)
HGB BLD-MCNC: 13.7 G/DL (ref 13.5–17.5)
IRON SATN MFR SERPL: 44 % (ref 20–50)
IRON SERPL-MCNC: 125 UG/DL (ref 59–158)
LDLC SERPL CALC-MCNC: 68 MG/DL
LYMPHOCYTES # BLD: 1.6 K/UL (ref 1–5.1)
LYMPHOCYTES NFR BLD: 25.1 %
MCH RBC QN AUTO: 30.5 PG (ref 26–34)
MCHC RBC AUTO-ENTMCNC: 33.9 G/DL (ref 31–36)
MCV RBC AUTO: 90 FL (ref 80–100)
MONOCYTES # BLD: 0.7 K/UL (ref 0–1.3)
MONOCYTES NFR BLD: 10.3 %
NEUTROPHILS # BLD: 4 K/UL (ref 1.7–7.7)
NEUTROPHILS NFR BLD: 61.1 %
PHOSPHATE SERPL-MCNC: 3.5 MG/DL (ref 2.5–4.9)
PLATELET # BLD AUTO: 251 K/UL (ref 135–450)
PMV BLD AUTO: 8.4 FL (ref 5–10.5)
POTASSIUM SERPL-SCNC: 4.4 MMOL/L (ref 3.5–5.1)
RBC # BLD AUTO: 4.49 M/UL (ref 4.2–5.9)
SODIUM SERPL-SCNC: 138 MMOL/L (ref 136–145)
TIBC SERPL-MCNC: 285 UG/DL (ref 260–445)
TRIGL SERPL-MCNC: 159 MG/DL (ref 0–150)
VLDLC SERPL CALC-MCNC: 32 MG/DL
WBC # BLD AUTO: 6.6 K/UL (ref 4–11)

## 2024-11-14 PROCEDURE — 36415 COLL VENOUS BLD VENIPUNCTURE: CPT

## 2024-11-14 PROCEDURE — 99214 OFFICE O/P EST MOD 30 MIN: CPT | Performed by: INTERNAL MEDICINE

## 2024-11-14 PROCEDURE — 83550 IRON BINDING TEST: CPT

## 2024-11-14 PROCEDURE — 80061 LIPID PANEL: CPT

## 2024-11-14 PROCEDURE — 83540 ASSAY OF IRON: CPT

## 2024-11-14 PROCEDURE — 85025 COMPLETE CBC W/AUTO DIFF WBC: CPT

## 2024-11-14 PROCEDURE — 3074F SYST BP LT 130 MM HG: CPT | Performed by: INTERNAL MEDICINE

## 2024-11-14 PROCEDURE — 80069 RENAL FUNCTION PANEL: CPT

## 2024-11-14 PROCEDURE — 3078F DIAST BP <80 MM HG: CPT | Performed by: INTERNAL MEDICINE

## 2024-11-14 ASSESSMENT — PATIENT HEALTH QUESTIONNAIRE - PHQ9
SUM OF ALL RESPONSES TO PHQ9 QUESTIONS 1 & 2: 0
SUM OF ALL RESPONSES TO PHQ QUESTIONS 1-9: 0
1. LITTLE INTEREST OR PLEASURE IN DOING THINGS: NOT AT ALL
2. FEELING DOWN, DEPRESSED OR HOPELESS: NOT AT ALL
SUM OF ALL RESPONSES TO PHQ QUESTIONS 1-9: 0

## 2024-11-14 NOTE — PROGRESS NOTES
Component Value Date    WBC 6.6 11/14/2024    HGB 13.7 11/14/2024     11/14/2024    MCV 90.0 11/14/2024     Lab Results   Component Value Date     11/14/2024    K 4.4 11/14/2024     11/14/2024    CO2 26 11/14/2024    BUN 16 11/14/2024    CREATININE 0.9 11/14/2024    GLUCOSE 61 (L) 11/14/2024     Fe / TIBC (5/22) 40 / 241 = 17% iron saturation  Fe / TIBC (11/14) 125 / 285 = 44% iron saturation    Lipid panel (11/14)   Cholesterol, Total 144   Triglycerides 159 High    HDL 44   LDL Cholesterol 68        Echocardiogram (3/23/23)  Left ventricular systolic function is severely reduced with a visually estimated ejection fraction of 15-20%.  The left ventricle is normal in size with mild concentric hypertrophy.  Global hypokinesis . Anteroseptal wall dyskinesis.     Echocardiogram (9/6/23) The left ventricle is normal in size with normal wall thickness. Left ventricular systolic function is low normal with a visually estimated ejection fraction of 50-55%. No clear regional wall motion abnormalities noted. Diastolic filling parameters suggests grade I diastolic dysfunction. Normal right ventricular size and function.  The aortic root measures 3.9 cm. The ascending aorta is mildly dilated at 3.9 cm. Inadequate tricuspid valve regurgitation to estimate systolic pulmonary artery pressure. IVC size is dilated (>2.1 cm) and collapses < 50% with respiration consistent with elevated RA pressure (15 mmHg).     PFT (4/24/23) 3.21 (79%) / 4.96 (83%) = 0.72 c/w normal spirometry     OBJECTIVE:    /73 (Site: Left Upper Arm, Position: Sitting)   Pulse 60   Ht 1.905 m (6' 3\")   Wt 87.1 kg (192 lb)   SpO2 99%   BMI 24.00 kg/m²   HEENT:  Oropharynx clear, no lymphadenopathy,   LUNGS:  Clear and without wheezes  HEART:  Regular rhythm, no appreciable murmur  ABD:  Benign, active bowel sounds  EXT:  No edema, pulses palpable  NEURO:  No focal neurologic deficits noted.    ASSESSMENT / PLAN:   Paroxysmal

## 2024-11-14 NOTE — PATIENT INSTRUCTIONS
Paroxysmal atrial fibrillation after ablation:  Continue taking apixaban (Eliquis) 5 mg TWICE per day for anticoagulation and metoprolol succinate (Toprol XL) 50 mg ONCE per day in the morning.    Coronary artery disease with ischemic cardiomyopathy:  Ejection fracture has improved from 20% (March 2023) 50-55% (Sept 2023).  Continue taking sacubitril-valsartan (Entresto) twice per day.    Dyslipidemia (LDL 46, goal less than 70):  Continue taking atorvastatin (Lipitor) 40 mg every evening.     Anemia (hemoglobin 8.0 gm/dL) with mild iron deficiency (iron saturation 17%) requiring 1 unit PRBC in May.  Colonoscopy in January 2024 with one benign polyp, repeat in five years.  Continue taking ferrous sulfate 325 mg tablet daily with food. Check CBC and iron studies today.   Erectile dysfunction:  Try taking sildenafil (Viagra) 50 mg tablet about 30 minutes prior to anticipated intercourse.  Avoid ANY nitrates or nitroglycerin within 48 hours of taking sildenafil.   History of smoking (stopped March 2023):  Normal spirometry (Apr 2023).      Follow-up 3-5 months  LABS: CBC, renal panel, lipid panel, and iron studies.

## 2025-01-28 ASSESSMENT — ENCOUNTER SYMPTOMS
RIGHT EYE: 0
HEMATEMESIS: 0
SHORTNESS OF BREATH: 0
HEMATOCHEZIA: 0
STRIDOR: 0
LEFT EYE: 0
WHEEZING: 0

## 2025-01-28 NOTE — PROGRESS NOTES
but this was mild. Patient denies current edema, chest pain, shortness of breath, palpitations, dizziness or syncope. Patient is taking all cardiac medications as prescribed and tolerates them well. He denies recent issues with bleeding or bruising.     Office Visit ([facility], 11/29/2023):  He presents today for a follow up visit. He reports that he feels well overall. Patient denies current edema, chest pain, shortness of breath, palpitations, dizziness or syncope. Patient is taking all cardiac medications as prescribed and tolerates them well. He denies any recent issues with bleeding or bruising.     Patient wore a cardiac event monitor from 02/27/2024 to 03/26/2024 which demonstrated predominately SR with an average HR of 60 () BPM. PAC burden 0.31%, PVC burden 1.12%.    Office Visit ([facility], 04/03/2024):  He presents to the clinic today for a follow up visit. He reports that he feels well overall. He has been working and tolerates exertional activities well. He does have occasional nosebleeds but they stop after pressure is applied. Patient denies current edema, chest pain, shortness of breath, palpitations, dizziness or syncope. Patient is taking all cardiac medications as prescribed and tolerates them well.  He denies spontaneous hematuria or hematochezia.     At office visit on 04/03/2024 Amiodarone was stopped.     Office Visit ([facility], 07/31/2024):  He presents to the clinic today for a follow up visit. He reports that he feels well overall. Patient denies current edema, chest pain, shortness of breath, palpitations, dizziness or syncope. Patient is taking all cardiac medications as prescribed and tolerates them well. He denies any recent issues with hematuria or hematochezia.       Office Visit ([facility], 01/29/2025):  He presents to the clinic today for a follow up visit. He reports that he feels well overall. He remains active working and tolerates activity well. He did experience a brief

## 2025-01-29 ENCOUNTER — OFFICE VISIT (OUTPATIENT)
Dept: CARDIOLOGY CLINIC | Age: 65
End: 2025-01-29
Payer: COMMERCIAL

## 2025-01-29 VITALS
WEIGHT: 199.8 LBS | HEART RATE: 57 BPM | BODY MASS INDEX: 24.84 KG/M2 | DIASTOLIC BLOOD PRESSURE: 68 MMHG | SYSTOLIC BLOOD PRESSURE: 120 MMHG | OXYGEN SATURATION: 97 % | HEIGHT: 75 IN

## 2025-01-29 DIAGNOSIS — I48.91 ATRIAL FIBRILLATION WITH RAPID VENTRICULAR RESPONSE (HCC): ICD-10-CM

## 2025-01-29 DIAGNOSIS — I25.110 CORONARY ARTERY DISEASE INVOLVING NATIVE CORONARY ARTERY OF NATIVE HEART WITH UNSTABLE ANGINA PECTORIS (HCC): ICD-10-CM

## 2025-01-29 DIAGNOSIS — I48.0 PAF (PAROXYSMAL ATRIAL FIBRILLATION) (HCC): Primary | ICD-10-CM

## 2025-01-29 DIAGNOSIS — I10 PRIMARY HYPERTENSION: ICD-10-CM

## 2025-01-29 DIAGNOSIS — I42.8 NICM (NONISCHEMIC CARDIOMYOPATHY) (HCC): ICD-10-CM

## 2025-01-29 LAB
EKG ATRIAL RATE: 57 BPM
EKG DIAGNOSIS: NORMAL
EKG P AXIS: 86 DEGREES
EKG P-R INTERVAL: 170 MS
EKG Q-T INTERVAL: 430 MS
EKG QRS DURATION: 86 MS
EKG QTC CALCULATION (BAZETT): 418 MS
EKG R AXIS: 21 DEGREES
EKG T AXIS: 90 DEGREES
EKG VENTRICULAR RATE: 57 BPM

## 2025-01-29 PROCEDURE — G2211 COMPLEX E/M VISIT ADD ON: HCPCS | Performed by: INTERNAL MEDICINE

## 2025-01-29 PROCEDURE — 3078F DIAST BP <80 MM HG: CPT | Performed by: INTERNAL MEDICINE

## 2025-01-29 PROCEDURE — 99214 OFFICE O/P EST MOD 30 MIN: CPT | Performed by: INTERNAL MEDICINE

## 2025-01-29 PROCEDURE — 3074F SYST BP LT 130 MM HG: CPT | Performed by: INTERNAL MEDICINE

## 2025-01-29 RX ORDER — METOPROLOL SUCCINATE 50 MG/1
50 TABLET, EXTENDED RELEASE ORAL DAILY
Qty: 90 TABLET | Refills: 3 | Status: SHIPPED | OUTPATIENT
Start: 2025-01-29

## 2025-01-29 RX ORDER — M-VIT,TX,IRON,MINS/CALC/FOLIC 27MG-0.4MG
1 TABLET ORAL DAILY
COMMUNITY

## 2025-01-29 NOTE — PATIENT INSTRUCTIONS
Plan:     The current medical regimen is effective;  continue present plan and medications.  Follow up with me in 8 months.

## 2025-05-09 ASSESSMENT — PATIENT HEALTH QUESTIONNAIRE - PHQ9
SUM OF ALL RESPONSES TO PHQ QUESTIONS 1-9: 0
SUM OF ALL RESPONSES TO PHQ QUESTIONS 1-9: 0
1. LITTLE INTEREST OR PLEASURE IN DOING THINGS: NOT AT ALL
2. FEELING DOWN, DEPRESSED OR HOPELESS: NOT AT ALL
2. FEELING DOWN, DEPRESSED OR HOPELESS: NOT AT ALL
1. LITTLE INTEREST OR PLEASURE IN DOING THINGS: NOT AT ALL
SUM OF ALL RESPONSES TO PHQ QUESTIONS 1-9: 0
SUM OF ALL RESPONSES TO PHQ9 QUESTIONS 1 & 2: 0
SUM OF ALL RESPONSES TO PHQ QUESTIONS 1-9: 0

## 2025-05-12 ENCOUNTER — OFFICE VISIT (OUTPATIENT)
Dept: INTERNAL MEDICINE CLINIC | Age: 65
End: 2025-05-12

## 2025-05-12 VITALS
HEIGHT: 75 IN | HEART RATE: 56 BPM | SYSTOLIC BLOOD PRESSURE: 115 MMHG | DIASTOLIC BLOOD PRESSURE: 75 MMHG | WEIGHT: 200 LBS | BODY MASS INDEX: 24.87 KG/M2 | OXYGEN SATURATION: 99 %

## 2025-05-12 DIAGNOSIS — I48.0 PAROXYSMAL ATRIAL FIBRILLATION (HCC): Primary | ICD-10-CM

## 2025-05-12 DIAGNOSIS — N52.01 ERECTILE DYSFUNCTION DUE TO ARTERIAL INSUFFICIENCY: ICD-10-CM

## 2025-05-12 DIAGNOSIS — E78.5 DYSLIPIDEMIA: ICD-10-CM

## 2025-05-12 DIAGNOSIS — D50.0 IRON DEFICIENCY ANEMIA DUE TO CHRONIC BLOOD LOSS: ICD-10-CM

## 2025-05-12 PROCEDURE — 3078F DIAST BP <80 MM HG: CPT | Performed by: INTERNAL MEDICINE

## 2025-05-12 PROCEDURE — 99214 OFFICE O/P EST MOD 30 MIN: CPT | Performed by: INTERNAL MEDICINE

## 2025-05-12 PROCEDURE — 3074F SYST BP LT 130 MM HG: CPT | Performed by: INTERNAL MEDICINE

## 2025-05-12 RX ORDER — SILDENAFIL 50 MG/1
50 TABLET, FILM COATED ORAL DAILY PRN
Qty: 10 TABLET | Refills: 5 | Status: SHIPPED | OUTPATIENT
Start: 2025-05-12

## 2025-05-12 NOTE — PROGRESS NOTES
SUBJECTIVE:  Follow up from 11/14/24 for  atrial fibrillation on apixaban.  Continues on Entresta (stopped valsartan and clopidogrel) by [place].  He had teeth extraction in May 2024 and required a blood transfusion.  Clopidogrel was stopped at that time.       History of present illness:    Hospitalized (Sept 2023) s/p atrial fibrillation ablation (9/5/23, Dr Zhang) at [facility].  He has been going to cardiac rehab three times per week and feels \"great\".   amiodarone discontinued by Dr. Zhang after ablation (Sept 2023).  No atrial fibrillation on 30 day event monitor.   Will be seen by Dr. Junior in June.  Recent dental surgery with implants.  He tried Flonase but stopped by nosebleeds.  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      Hospitalized (Mar 2023) with atrial fibrillation with rapid ventricular response s/p SUSI and cardioversion (3/6). Echo with EF of 20%.  Left heart catheterization showed severe multivessel CAD and ischemic cardiomyopathy.  LAD and diagonal 2 branch treated with PCI and Impella (3/10 - 3/11).   Taking clopidogrel 75 mg daily but not aspirin.  He was hospitalized (3/23 - 3/24) with orthostatic hypotension and atrial fibrillation with RVR, adjusted dose of amiodarone to 200 mg once per day (down from BID).  Entresto and spironolactone held.  Stopped smoking Mar 3 after 40 pk/yr history of smoking.     Completed shingles vaccine series (Oct 2024 and Jan 2025)        MEDICATIONS:  Ascorbic Acid (VITAMIN C PO) Take by mouth   Ferrous Sulfate (IRON PO) Take by mouth   Loratadine (CLARITIN PO) Take by mouth   metoprolol succinate (TOPROL XL) 50 MG extended release tablet Take 1 tablet by mouth daily   atorvastatin (LIPITOR) 40 MG tablet Take 1 tablet by mouth nightly   apixaban (ELIQUIS) 5 MG TABS tablet Take 1 tablet by mouth 2 times daily   sacubitril-valsartan (ENTRESTO) 24-26 MG per tablet Take 1 tablet by mouth 2 times daily   sildenafil (VIAGRA) 50 MG tablet Take 1

## 2025-05-12 NOTE — PATIENT INSTRUCTIONS
Paroxysmal atrial fibrillation after ablation:  Continue taking apixaban (Eliquis) 5 mg TWICE per day for anticoagulation and metoprolol succinate (Toprol XL) 50 mg ONCE per day in the morning.    Coronary artery disease with ischemic cardiomyopathy:  Ejection fractureimproved from 20% (March 2023) 50-55% (Sept 2023).  Continue taking sacubitril-valsartan (Entresto) twice per day.    Dyslipidemia (LDL 68, goal less than 70):  Continue taking atorvastatin (Lipitor) 40 mg every evening.     History of anemia requiring 1 unit PRBC in May 2024.  Colonoscopy in January 2024 with one benign polyp, repeat in five years.  Iron saturation increased from 17% (May 2024) to 44% (Nov 2024).  You can STOP taking ferrous sulfate 325 mg tablet daily with food.   Erectile dysfunction:  Continue taking sildenafil (Viagra) 50 mg tablet about 30 minutes prior to anticipated intercourse.  Avoid ANY nitrates or nitroglycerin within 48 hours of taking sildenafil.   History of smoking (stopped March 2023):  Normal spirometry (Apr 2023).      Follow-up as needed or six months  Repeat colonoscopy in January 2029

## 2025-06-04 NOTE — PROGRESS NOTES
[facility] - Daily Progress/Follow-up Note        REASON FOR FOLLOW UP  CAD, ischemic cardiomyopathy      INTERVAL HISTORY  Mr. Mckinnon is a 64 y.o. male presenting for hospital follow up. He has a medical history of AFib with RVR, hypertension, CAD s/p complex PCI, pulmonary hypertension, hyperlipidemia, severe ischemic cardiomyopathy.    He was admitted 3/3/2023-3/14/0223 for shortness of breath, fatigue. He was found to be in Afib with RVR. Echo 3/3/2023 EF 15-20%, moderate bi-atrial enlargement, mild TR, mild to moderate MR. 3/6/2023 SUSI with cardioversion performed for Afib.  Coronary angiogram 3/7/2023 showed severe two vessel CAD suggesting ischemic cardiomyopathy, severe elevated left sided filling pressures suggestive of volume overload. On 3/10/2023 Successful external electrical cardioversion. On 3/10/2023 coronary angiogram via right femoral approach impella supported performed PCI to mid to distal LAD with OLLA, PCI diagonal 2 with POBA, Distal aortic and iliofemoral arterial angiogram, PTA to proximal right common iliac artery, Impella removed and patient extubated next day.  Overall successful procedure without any complications.  He was discharged on guideline directed medical therapy for CAD, cardiomyopathy, A-fib.    OV, 3/23/2023,  he presented for first cardiology outpatient visit with increased weakness and fatigue as well as lightheadedness.  Was found to be hypotensive.  He works at a [employer] market and the first day went back to work he was feeling extremely tired and lightheaded so could not continue work. He was noted to be hypotensive with systolic blood pressure in the 80's. From the office, he was sent to emergency department due to concern for cardiac medication related hypotension as well as suspected volume depletion and dehydration.    Admitted 3/23/2023-3/24/2023 from clinic for weakness and shortness of breath. Following admission his home metoprolol XL dosing was reduced

## 2025-06-06 ENCOUNTER — OFFICE VISIT (OUTPATIENT)
Dept: CARDIOLOGY CLINIC | Age: 65
End: 2025-06-06
Payer: COMMERCIAL

## 2025-06-06 VITALS
SYSTOLIC BLOOD PRESSURE: 128 MMHG | OXYGEN SATURATION: 98 % | HEIGHT: 75 IN | HEART RATE: 63 BPM | BODY MASS INDEX: 24.87 KG/M2 | DIASTOLIC BLOOD PRESSURE: 66 MMHG | WEIGHT: 200 LBS

## 2025-06-06 DIAGNOSIS — I50.32 HEART FAILURE WITH RECOVERED EJECTION FRACTION (HFRECEF) (HCC): ICD-10-CM

## 2025-06-06 DIAGNOSIS — I42.9 CARDIOMYOPATHY, UNSPECIFIED TYPE (HCC): ICD-10-CM

## 2025-06-06 DIAGNOSIS — I50.22 CHRONIC SYSTOLIC HEART FAILURE (HCC): ICD-10-CM

## 2025-06-06 DIAGNOSIS — I25.110 CORONARY ARTERY DISEASE INVOLVING NATIVE CORONARY ARTERY OF NATIVE HEART WITH UNSTABLE ANGINA PECTORIS (HCC): Primary | ICD-10-CM

## 2025-06-06 DIAGNOSIS — I48.20 CHRONIC ATRIAL FIBRILLATION (HCC): ICD-10-CM

## 2025-06-06 DIAGNOSIS — I25.10 CORONARY ARTERY DISEASE INVOLVING NATIVE CORONARY ARTERY OF NATIVE HEART WITHOUT ANGINA PECTORIS: ICD-10-CM

## 2025-06-06 PROCEDURE — 3078F DIAST BP <80 MM HG: CPT | Performed by: INTERNAL MEDICINE

## 2025-06-06 PROCEDURE — 3074F SYST BP LT 130 MM HG: CPT | Performed by: INTERNAL MEDICINE

## 2025-06-06 PROCEDURE — 99213 OFFICE O/P EST LOW 20 MIN: CPT | Performed by: INTERNAL MEDICINE

## 2025-06-06 RX ORDER — ATORVASTATIN CALCIUM 40 MG/1
40 TABLET, FILM COATED ORAL NIGHTLY
Qty: 90 TABLET | Refills: 3 | Status: SHIPPED | OUTPATIENT
Start: 2025-06-06

## 2025-06-06 NOTE — PATIENT INSTRUCTIONS
PLAN:  Continue current cardiac medications eliquis 5 BID, lipitor 40, toprol xl 50, entresto 24-26 BID  No further cardiac testing at this time  Follow up in 1 year

## 2025-06-11 RX ORDER — METOPROLOL SUCCINATE 50 MG/1
50 TABLET, EXTENDED RELEASE ORAL DAILY
Qty: 90 TABLET | Refills: 4 | Status: SHIPPED | OUTPATIENT
Start: 2025-06-11

## 2025-06-11 NOTE — TELEPHONE ENCOUNTER
Requested Prescriptions     Pending Prescriptions Disp Refills    metoprolol succinate (TOPROL XL) 50 MG extended release tablet [Pharmacy Med Name: METOPROLOL SUCCINATE ER 50MG TB24] 90 tablet 2     Sig: TAKE ONE TABLET BY MOUTH ONCE A DAY    Follow up scheduled 10/01/2025

## 2025-07-04 DIAGNOSIS — I25.110 CORONARY ARTERY DISEASE INVOLVING NATIVE CORONARY ARTERY OF NATIVE HEART WITH UNSTABLE ANGINA PECTORIS (HCC): ICD-10-CM

## 2025-07-07 RX ORDER — ATORVASTATIN CALCIUM 40 MG/1
40 TABLET, FILM COATED ORAL NIGHTLY
Qty: 90 TABLET | Refills: 2 | Status: SHIPPED | OUTPATIENT
Start: 2025-07-07

## 2025-07-07 NOTE — TELEPHONE ENCOUNTER
Requested Prescriptions     Pending Prescriptions Disp Refills    atorvastatin (LIPITOR) 40 MG tablet [Pharmacy Med Name: ATORVASTATIN CALCIUM 40MG TABS] 90 tablet 2     Sig: TAKE ONE TABLET BY MOUTH EVERY NIGHT      Follow up scheduled for 10/01/2025